# Patient Record
Sex: MALE | Race: WHITE | NOT HISPANIC OR LATINO | Employment: FULL TIME | URBAN - METROPOLITAN AREA
[De-identification: names, ages, dates, MRNs, and addresses within clinical notes are randomized per-mention and may not be internally consistent; named-entity substitution may affect disease eponyms.]

---

## 2017-01-05 ENCOUNTER — OFFICE VISIT (OUTPATIENT)
Dept: NEUROLOGY | Facility: CLINIC | Age: 62
End: 2017-01-05

## 2017-01-05 VITALS
DIASTOLIC BLOOD PRESSURE: 86 MMHG | HEIGHT: 66 IN | BODY MASS INDEX: 26.52 KG/M2 | OXYGEN SATURATION: 98 % | SYSTOLIC BLOOD PRESSURE: 140 MMHG | WEIGHT: 165 LBS | HEART RATE: 78 BPM

## 2017-01-05 DIAGNOSIS — I69.90 LATE EFFECTS OF CVA (CEREBROVASCULAR ACCIDENT): ICD-10-CM

## 2017-01-05 DIAGNOSIS — I63.81: Primary | ICD-10-CM

## 2017-01-05 DIAGNOSIS — G89.0 CENTRAL PAIN SYNDROME: ICD-10-CM

## 2017-01-05 PROCEDURE — 99213 OFFICE O/P EST LOW 20 MIN: CPT | Performed by: NURSE PRACTITIONER

## 2017-01-05 RX ORDER — GABAPENTIN 100 MG/1
100 CAPSULE ORAL 2 TIMES DAILY
Qty: 60 CAPSULE | Refills: 5 | Status: SHIPPED | OUTPATIENT
Start: 2017-01-05 | End: 2017-04-07

## 2017-01-05 NOTE — PROGRESS NOTES
Subjective:     Patient ID: Jose D Bailey is a 61 y.o. male.    Stroke   This is a new problem. Episode onset: 11/17/16-11/18/16 was at home and suddenly felt entire right side of body was shot with novacaine and thought it was his blood suagr and then called his wife and she came home and called 911. he was diagnosed with left thalamic infarct ischemic  The problem occurs constantly (numbness and tingling constant right face, eye, mouth, shoulder, abdomen, axilla, knee and flank). The problem has been gradually improving. Associated symptoms include numbness. Pertinent negatives include no abdominal pain, arthralgias, chest pain, chills, coughing, fatigue, fever, headaches, joint swelling, myalgias, nausea, neck pain, rash, sore throat, vomiting or weakness. Associated symptoms comments: Was off aspirin and Crestor prior to stroke for about 3 months and restarted this with admission. hgba1c 8.1%. Has had a stressful year with mother having a stroke and sister had breast cancer and it has been a tough year but denies anxiety or depression and the stress is improving. Has occasional cramping/squeezing in abdomen like a muscle spasms and right eye. Increased hot/cold sensation.. Nothing aggravates the symptoms. He has tried lying down and position changes for the symptoms. The treatment provided mild relief.      Admission to [facility] 11/17-11/18/2016 for left lacunar thalamic infarct with normal MRI brain. Workup as listed. CT of head w/o was normal, CT cerebral perfusion scan was negative, MRI was normal, CTA of neck only showed small calcification of posterior aspect of left ICA. ECHO showed normal LV fxn, EF 63%, with mild concentric hypertrophy of LV wall c/w LV diastolic dysfunction grade 1. Bilat Carotid duplex showed bilat 0-49% ICA stenosis. EKG showed NSR.     The following portions of the patient's history were reviewed and updated as appropriate: allergies, current medications, past family history, past  medical history, past social history, past surgical history and problem list.    Review of Systems   Constitutional: Negative for chills, fatigue, fever and unexpected weight change.   HENT: Negative for ear pain, hearing loss, nosebleeds, rhinorrhea and sore throat.    Eyes: Negative for photophobia, pain, discharge, itching and visual disturbance.   Respiratory: Negative for cough, chest tightness, shortness of breath and wheezing.    Cardiovascular: Negative for chest pain, palpitations and leg swelling.   Gastrointestinal: Negative for abdominal pain, blood in stool, constipation, diarrhea, nausea and vomiting.   Genitourinary: Negative for dysuria, frequency, hematuria and urgency.   Musculoskeletal: Negative for arthralgias, back pain, gait problem, joint swelling, myalgias, neck pain and neck stiffness.   Skin: Negative for rash and wound.   Allergic/Immunologic: Negative for environmental allergies and food allergies.   Neurological: Positive for numbness. Negative for dizziness, tremors, seizures, syncope, speech difficulty, weakness, light-headedness and headaches.        TINGLING   Hematological: Negative for adenopathy. Bruises/bleeds easily.   Psychiatric/Behavioral: Negative for agitation, confusion, decreased concentration, hallucinations, sleep disturbance and suicidal ideas. The patient is not nervous/anxious.         Objective:    Neurologic Exam     Mental Status   Oriented to person, place, and time.   Registration: recalls 3 of 3 objects. Recall at 5 minutes: recalls 3 of 3 objects. Follows 3 step commands.   Attention: normal. Concentration: normal.   Speech: speech is normal   Level of consciousness: alert  Knowledge: good and consistent with education. Able to perform simple calculations.   Able to name object. Able to read. Able to repeat. Able to write. Normal comprehension.     Cranial Nerves     CN II   Visual fields full to confrontation.     CN III, IV, VI   Pupils are equal, round, and  reactive to light.  Extraocular motions are normal.     CN V   Right facial sensation deficit: forehead, cheeks and mandible  Left facial sensation deficit: none  Right corneal reflex: normal  Left corneal reflex: normal  Jaw jerk: normal    CN VII   Right facial weakness: central (minimal but decreased with puffing cheeks)  Left facial weakness: none  Right taste: normal  Left taste: normal    CN VIII   CN VIII normal.     CN IX, X   CN IX normal.   CN X normal.     CN XI   CN XI normal.     CN XII   CN XII normal.     Motor Exam   Muscle bulk: normal  Overall muscle tone: normal    Strength   Strength 5/5 throughout.     Sensory Exam   Right arm light touch: decreased from elbow  Left arm light touch: normal  Right leg light touch: decreased from knee  Left leg light touch: normal  Vibration normal.   Proprioception normal.   Right arm pinprick: decreased from elbow  Left arm pinprick: normal  Right leg pinprick: decreased from knee  Left leg pinprick: normal       Decreased sensation to light tough right face, right ear, right shoulder, right wrist, right axilla, right abdomen.     Gait, Coordination, and Reflexes     Gait  Gait: normal    Coordination   Romberg: negative  Finger to nose coordination: normal  Heel to shin coordination: normal    Tremor   Resting tremor: absent  Intention tremor: absent  Action tremor: absent    Reflexes   Right brachioradialis: 2+  Left brachioradialis: 2+  Right biceps: 2+  Left biceps: 2+  Right triceps: 2+  Left triceps: 2+  Right patellar: 2+  Left patellar: 2+  Right achilles: 2+  Left achilles: 2+  Right : 2+  Left : 2+  Right plantar: normal  Left plantar: normal  Right Gauthier: absent  Left Gauthier: absent  Right ankle clonus: absent  Left ankle clonus: absent      Physical Exam   Constitutional: He is oriented to person, place, and time. He appears well-developed and well-nourished.   Eyes: EOM are normal. Pupils are equal, round, and reactive to light.    Cardiovascular: Normal rate, regular rhythm, S1 normal, S2 normal and normal heart sounds.    Pulmonary/Chest: Effort normal and breath sounds normal.   Neurological: He is oriented to person, place, and time. He has normal strength. He has a normal Finger-Nose-Finger Test, a normal Heel to Montejo Test and a normal Romberg Test. Gait normal.   Reflex Scores:       Tricep reflexes are 2+ on the right side and 2+ on the left side.       Bicep reflexes are 2+ on the right side and 2+ on the left side.       Brachioradialis reflexes are 2+ on the right side and 2+ on the left side.       Patellar reflexes are 2+ on the right side and 2+ on the left side.       Achilles reflexes are 2+ on the right side and 2+ on the left side.  Psychiatric: He has a normal mood and affect. His speech is normal and behavior is normal. Judgment and thought content normal. Cognition and memory are normal.       Assessment/Plan:     Jose D was seen today for weakness - generalized and stroke.    Diagnoses and all orders for this visit:    Left subthalamic lacunar stroke    Central pain syndrome  -     gabapentin (NEURONTIN) 100 MG capsule; Take 1 capsule by mouth 2 (Two) Times a Day.    Late effects of CVA (cerebrovascular accident)         Reviewed medications, potential side effects and signs and symptoms to report. Discussed risk versus benefits of treatment plan with patient and/or family-including medications, labs and radiology that may be ordered. Addressed questions and concerns during visit. Patient and/or family verbalized understanding and agree with plan. Discussed signs and symptoms of stroke and when to call 911. Instructed to follow a low fat diet including the Mediterranean diet. Instructed to take all medications daily as prescribed. Encouraged 30 minutes of exercise 3-4 times a week as tolerated. Stay well hydrated. Discussed potential side effects of new medications and signs and symptoms to report. If patient is currently  using tobacco products, smoking cessation was encouraged. Reviewed stroke risk factors and stroke prevention plan. Patient and/or family verbalizes understanding and agrees with plan. 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 He will let us know how he is doing this. These symptoms are a LE of CVA. Trying to limit medications as much as possible. Continue working with PCP for better diabetes management. F/U in 3 months for re-eval.     During this visit the following were done:  Labs Reviewed []    Labs Ordered []    Radiology Reports Reviewed []    Radiology Ordered []    PCP Records Reviewed []    Referring Provider Records Reviewed []    ER Records Reviewed []    Hospital Records Reviewed []    History Obtained From Family []    Radiology Images Reviewed []    Other Reviewed []    Records Requested []

## 2017-01-05 NOTE — LETTER
January 5, 2017     Clinton Day MD  [address]  [facility] [address]    Patient: Jose D Bailey   YOB: 1955   Date of Visit: 1/5/2017       Dear Dr. Shay MD:    Jose D Bailey was in my office today. Below is a copy of my note.    If you have questions, please do not hesitate to call me. I look forward to following Jose D along with you.         Sincerely,        BRENDON Abrams        CC: Daquan Melgar MD    Subjective:     Patient ID: Jose D Bailey is a 61 y.o. male.    Stroke   This is a new problem. Episode onset: 11/17/16-11/18/16 was at home and suddenly felt entire right side of body was shot with novacaine and thought it was his blood suagr and then called his wife and she came home and called 911. he was diagnosed with left thalamic infarct ischemic  The problem occurs constantly (numbness and tingling constant right face, eye, mouth, shoulder, abdomen, axilla, knee and flank). The problem has been gradually improving. Associated symptoms include numbness. Pertinent negatives include no abdominal pain, arthralgias, chest pain, chills, coughing, fatigue, fever, headaches, joint swelling, myalgias, nausea, neck pain, rash, sore throat, vomiting or weakness. Associated symptoms comments: Was off aspirin and Crestor prior to stroke for about 3 months and restarted this with admission. hgba1c 8.1%. Has had a stressful year with mother having a stroke and sister had breast cancer and it has been a tough year but denies anxiety or depression and the stress is improving. Has occasional cramping/squeezing in abdomen like a muscle spasms and right eye. Increased hot/cold sensation.. Nothing aggravates the symptoms. He has tried lying down and position changes for the symptoms. The treatment provided mild relief.      Admission to [facility] 11/17-11/18/2016 for left lacunar thalamic infarct with normal MRI brain. Workup as listed. CT of head w/o was normal, CT cerebral  perfusion scan was negative, MRI was normal, CTA of neck only showed small calcification of posterior aspect of left ICA. ECHO showed normal LV fxn, EF 63%, with mild concentric hypertrophy of LV wall c/w LV diastolic dysfunction grade 1. Bilat Carotid duplex showed bilat 0-49% ICA stenosis. EKG showed NSR.     The following portions of the patient's history were reviewed and updated as appropriate: allergies, current medications, past family history, past medical history, past social history, past surgical history and problem list.    Review of Systems   Constitutional: Negative for chills, fatigue, fever and unexpected weight change.   HENT: Negative for ear pain, hearing loss, nosebleeds, rhinorrhea and sore throat.    Eyes: Negative for photophobia, pain, discharge, itching and visual disturbance.   Respiratory: Negative for cough, chest tightness, shortness of breath and wheezing.    Cardiovascular: Negative for chest pain, palpitations and leg swelling.   Gastrointestinal: Negative for abdominal pain, blood in stool, constipation, diarrhea, nausea and vomiting.   Genitourinary: Negative for dysuria, frequency, hematuria and urgency.   Musculoskeletal: Negative for arthralgias, back pain, gait problem, joint swelling, myalgias, neck pain and neck stiffness.   Skin: Negative for rash and wound.   Allergic/Immunologic: Negative for environmental allergies and food allergies.   Neurological: Positive for numbness. Negative for dizziness, tremors, seizures, syncope, speech difficulty, weakness, light-headedness and headaches.        TINGLING   Hematological: Negative for adenopathy. Bruises/bleeds easily.   Psychiatric/Behavioral: Negative for agitation, confusion, decreased concentration, hallucinations, sleep disturbance and suicidal ideas. The patient is not nervous/anxious.         Objective:    Neurologic Exam     Mental Status   Oriented to person, place, and time.   Registration: recalls 3 of 3 objects.  Recall at 5 minutes: recalls 3 of 3 objects. Follows 3 step commands.   Attention: normal. Concentration: normal.   Speech: speech is normal   Level of consciousness: alert  Knowledge: good and consistent with education. Able to perform simple calculations.   Able to name object. Able to read. Able to repeat. Able to write. Normal comprehension.     Cranial Nerves     CN II   Visual fields full to confrontation.     CN III, IV, VI   Pupils are equal, round, and reactive to light.  Extraocular motions are normal.     CN V   Right facial sensation deficit: forehead, cheeks and mandible  Left facial sensation deficit: none  Right corneal reflex: normal  Left corneal reflex: normal  Jaw jerk: normal    CN VII   Right facial weakness: central (minimal but decreased with puffing cheeks)  Left facial weakness: none  Right taste: normal  Left taste: normal    CN VIII   CN VIII normal.     CN IX, X   CN IX normal.   CN X normal.     CN XI   CN XI normal.     CN XII   CN XII normal.     Motor Exam   Muscle bulk: normal  Overall muscle tone: normal    Strength   Strength 5/5 throughout.     Sensory Exam   Right arm light touch: decreased from elbow  Left arm light touch: normal  Right leg light touch: decreased from knee  Left leg light touch: normal  Vibration normal.   Proprioception normal.   Right arm pinprick: decreased from elbow  Left arm pinprick: normal  Right leg pinprick: decreased from knee  Left leg pinprick: normal       Decreased sensation to light tough right face, right ear, right shoulder, right wrist, right axilla, right abdomen.     Gait, Coordination, and Reflexes     Gait  Gait: normal    Coordination   Romberg: negative  Finger to nose coordination: normal  Heel to shin coordination: normal    Tremor   Resting tremor: absent  Intention tremor: absent  Action tremor: absent    Reflexes   Right brachioradialis: 2+  Left brachioradialis: 2+  Right biceps: 2+  Left biceps: 2+  Right triceps: 2+  Left  triceps: 2+  Right patellar: 2+  Left patellar: 2+  Right achilles: 2+  Left achilles: 2+  Right : 2+  Left : 2+  Right plantar: normal  Left plantar: normal  Right Gauthier: absent  Left Gauthier: absent  Right ankle clonus: absent  Left ankle clonus: absent      Physical Exam   Constitutional: He is oriented to person, place, and time. He appears well-developed and well-nourished.   Eyes: EOM are normal. Pupils are equal, round, and reactive to light.   Cardiovascular: Normal rate, regular rhythm, S1 normal, S2 normal and normal heart sounds.    Pulmonary/Chest: Effort normal and breath sounds normal.   Neurological: He is oriented to person, place, and time. He has normal strength. He has a normal Finger-Nose-Finger Test, a normal Heel to Montejo Test and a normal Romberg Test. Gait normal.   Reflex Scores:       Tricep reflexes are 2+ on the right side and 2+ on the left side.       Bicep reflexes are 2+ on the right side and 2+ on the left side.       Brachioradialis reflexes are 2+ on the right side and 2+ on the left side.       Patellar reflexes are 2+ on the right side and 2+ on the left side.       Achilles reflexes are 2+ on the right side and 2+ on the left side.  Psychiatric: He has a normal mood and affect. His speech is normal and behavior is normal. Judgment and thought content normal. Cognition and memory are normal.       Assessment/Plan:     Jose D was seen today for weakness - generalized and stroke.    Diagnoses and all orders for this visit:    Left subthalamic lacunar stroke    Central pain syndrome  -     gabapentin (NEURONTIN) 100 MG capsule; Take 1 capsule by mouth 2 (Two) Times a Day.    Late effects of CVA (cerebrovascular accident)         Reviewed medications, potential side effects and signs and symptoms to report. Discussed risk versus benefits of treatment plan with patient and/or family-including medications, labs and radiology that may be ordered. Addressed questions and concerns  during visit. Patient and/or family verbalized understanding and agree with plan. Discussed signs and symptoms of stroke and when to call 911. Instructed to follow a low fat diet including the Mediterranean diet. Instructed to take all medications daily as prescribed. Encouraged 30 minutes of exercise 3-4 times a week as tolerated. Stay well hydrated. Discussed potential side effects of new medications and signs and symptoms to report. If patient is currently using tobacco products, smoking cessation was encouraged. Reviewed stroke risk factors and stroke prevention plan. Patient and/or family verbalizes understanding and agrees with plan. 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 He will let us know how he is doing this. These symptoms are a LE of CVA. Trying to limit medications as much as possible. Continue working with PCP for better diabetes management. F/U in 3 months for re-eval.     During this visit the following were done:  Labs Reviewed []    Labs Ordered []    Radiology Reports Reviewed []    Radiology Ordered []    PCP Records Reviewed []    Referring Provider Records Reviewed []    ER Records Reviewed []    Hospital Records Reviewed []    History Obtained From Family []    Radiology Images Reviewed []    Other Reviewed []    Records Requested []

## 2017-01-05 NOTE — MR AVS SNAPSHOT
"                        Jose D Bailey   1/5/2017 2:30 PM   Office Visit    Dept Phone:  [phone]   Encounter #:  [id number]    Provider:  BRENDON Abrams   Department:  [facility] NEUROLOGY                Your Full Care Plan              Today's Medication Changes          These changes are accurate as of: 1/5/17  3:38 PM.  If you have any questions, ask your nurse or doctor.               New Medication(s)Ordered:     gabapentin 100 MG capsule   Commonly known as:  NEURONTIN   Take 1 capsule by mouth 2 (Two) Times a Day.   Started by:  BRENDON Abrams            Where to Get Your Medications      These medications were sent to [facility] [address] [place] [address] - [address] - [phone]  - [phone]   [address] [place] [address]     Phone:  [phone]     gabapentin 100 MG capsule                  Your Updated Medication List          This list is accurate as of: 1/5/17  3:38 PM.  Always use your most recent med list.                aspirin 81 MG tablet       COMFORT ASSIST INSULIN SYRINGE 31G X 5/16\" 0.5 ML misc   Generic drug:  Insulin Syringe-Needle U-100   USE AS DIRECTED ONCE DAILY       gabapentin 100 MG capsule   Commonly known as:  NEURONTIN   Take 1 capsule by mouth 2 (Two) Times a Day.       glucose blood test strip   Check glucose twice a day.       glucose monitor monitoring kit   Check glucose twice a day.       LANTUS 100 UNIT/ML injection   Generic drug:  insulin glargine       lisinopril 40 MG tablet   Commonly known as:  PRINIVIL,ZESTRIL   TAKE 1 TABLET BY MOUTH DAILY FOR BLOOD PRESSURE       metFORMIN 500 MG tablet   Commonly known as:  GLUCOPHAGE   Take 1 tablet by mouth 2 (Two) Times a Day With Meals.       nebivolol 5 MG tablet   Commonly known as:  BYSTOLIC   Take 1 tablet by mouth Daily.       pioglitazone 30 MG tablet   Commonly known as:  ACTOS   TAKE ONE TABLET BY MOUTH DAILY       rosuvastatin 40 MG " tablet   Commonly known as:  CRESTOR   Take 1 tablet by mouth Daily.               You Were Diagnosed With        Codes Comments    Left subthalamic lacunar stroke    -  Primary ICD-10-CM: I63.50  ICD-9-CM: 434.91     Central pain syndrome     ICD-10-CM: G89.0  ICD-9-CM: 338.0     Late effects of CVA (cerebrovascular accident)     ICD-10-CM: I69.90  ICD-9-CM: 438.9       Instructions     None    Patient Instructions History      Upcoming Appointments     Visit Type Date Time Department    HOSPITAL FOLLOW UP 1/5/2017  2:30 PM [facility]    FOLLOW UP 3/1/2017  8:00 AM [facility] [place]    FOLLOW UP 4/7/2017  1:00 PM [facility]    FOLLOW UP 4/21/2017  2:45 PM [facility]      [facility] Signup     Our records indicate that you have an active [employer] account.    You can view your After Visit Summary by going to [employer] and logging in with your [employer] username and password.  If you don't have a [employer] username and password but a parent or guardian has access to your record, the parent or guardian should login with their own [employer] username and password and access your record to view the After Visit Summary.    If you have questions, you can email [email or web address] or call [phone] to talk to our [employer] staff.  Remember, [employer] is NOT to be used for urgent needs.  For medical emergencies, dial 911.               Other Info from Your Visit           Your Appointments     Mar 01, 2017  8:00 AM EST   Follow Up with Clinton Day MD   [facility] FAMILY MEDICINE (--)    [address]  [facility] [id number] [phone]           Arrive 15 minutes prior to appointment.            Apr 07, 2017  1:00 PM EDT   Follow Up with BRENDON Abrams   [facility] NEUROLOGY (--)    [address]  [facility] [id number] [phone]           Arrive 15 minutes prior to  "appointment.            Apr 21, 2017  2:45 PM EDT   Follow Up with Daquan Melgar MD   [facility] CARDIOLOGY (--)    [address]  [facility] [address] [phone]           Arrive 15 minutes prior to appointment.              Allergies     Atorvastatin      Influenza Vaccines Unspecified     Unknown reaction    Pravachol [Pravastatin Sodium]      Pravastatin      Pseudoephedrine  Other (See Comments)    Simvastatin      Sulfa Antibiotics  Rash      Reason for Visit     Weakness - Generalized     Stroke           Vital Signs     Blood Pressure Pulse Height Weight Oxygen Saturation Body Mass Index    140/86 78 66\" (167.6 cm) 165 lb (74.8 kg) 98% 26.63 kg/m2    Smoking Status                   Never Smoker           Problems and Diagnoses Noted     Central pain syndrome    Late effects of CVA (cerebrovascular accident)    Left subthalamic lacunar stroke        "

## 2017-01-05 NOTE — Clinical Note
January 5, 2017     Clinton Day MD  [address]  [place] [address]    Patient: Jose D Bailey   YOB: 1955   Date of Visit: 1/5/2017       Dear Dr. Shay MD:    Thank you for referring Jose D Bailey to me for evaluation. Below are the relevant portions of my assessment and plan of care.       Jose D was seen today for weakness - generalized and stroke.    Diagnoses and all orders for this visit:    Left subthalamic lacunar stroke    Central pain syndrome  -     gabapentin (NEURONTIN) 100 MG capsule; Take 1 capsule by mouth 2 (Two) Times a Day.    Late effects of CVA (cerebrovascular accident)               If you have questions, please do not hesitate to call me. I look forward to following Jose D along with you.         Sincerely,        BRENDON Abrams        CC: Han Berry MD

## 2017-03-01 ENCOUNTER — OFFICE VISIT (OUTPATIENT)
Dept: FAMILY MEDICINE CLINIC | Facility: CLINIC | Age: 62
End: 2017-03-01

## 2017-03-01 VITALS
BODY MASS INDEX: 26.84 KG/M2 | DIASTOLIC BLOOD PRESSURE: 80 MMHG | OXYGEN SATURATION: 96 % | HEIGHT: 66 IN | WEIGHT: 167 LBS | HEART RATE: 68 BPM | TEMPERATURE: 98.2 F | SYSTOLIC BLOOD PRESSURE: 142 MMHG

## 2017-03-01 DIAGNOSIS — E11.9 TYPE 2 DIABETES MELLITUS WITHOUT COMPLICATION, WITHOUT LONG-TERM CURRENT USE OF INSULIN (HCC): Primary | ICD-10-CM

## 2017-03-01 DIAGNOSIS — I63.81: ICD-10-CM

## 2017-03-01 DIAGNOSIS — S62.307A CLOSED NONDISPLACED FRACTURE OF FIFTH METACARPAL BONE OF LEFT HAND, UNSPECIFIED PORTION OF METACARPAL, INITIAL ENCOUNTER: ICD-10-CM

## 2017-03-01 LAB — HBA1C MFR BLD: 7.8 %

## 2017-03-01 PROCEDURE — 83036 HEMOGLOBIN GLYCOSYLATED A1C: CPT | Performed by: FAMILY MEDICINE

## 2017-03-01 PROCEDURE — 99214 OFFICE O/P EST MOD 30 MIN: CPT | Performed by: FAMILY MEDICINE

## 2017-03-01 NOTE — PROGRESS NOTES
"Subjective   Jose D Bailey is a 61 y.o. male.     Diabetes   He presents for his follow-up diabetic visit. He has type 2 diabetes mellitus. No MedicAlert identification noted. There are no hypoglycemic associated symptoms. There are no diabetic associated symptoms. Pertinent negatives for diabetes include no chest pain. There are no hypoglycemic complications. There are no diabetic complications. Risk factors for coronary artery disease include diabetes mellitus and male sex. Current diabetic treatment includes oral agent (triple therapy) (Lantus,Metformin and Actos). He is compliant with treatment all of the time. His weight is stable. He is following a generally healthy diet. He has not had a previous visit with a dietitian. He participates in exercise intermittently. There is no change in his home blood glucose trend. An ACE inhibitor/angiotensin II receptor blocker is being taken. He does not see a podiatrist.Eye exam is current.      He states he is the same with stroke symptoms on his right side. Face arm and abdomen.  \"Tight feeling in the right side of his face and right side of his abdomen.\" that usually improves if he moves about. The patient does employ self massage of the right face and right arm. He does experience tightness in the right side of his abdomen and chest. It is fairly constant in nature. As a result of the stroke. He has no motor losses. He will be seeing neurology again in April. He does take gabapentin 100 mg at night, which gives some relief. His prescription is been written for twice a day and we instructed him to increase his gabapentin to 100 mg twice a day.    Also states he fell about 8 weeks ago and is still having pain in his left hand. This is in the area of the left fifth metacarpal distally. There is some mild tenderness there. Range of motion and no pain with resistance.    The following portions of the patient's history were reviewed and updated as appropriate: allergies, " "current medications, past social history and problem list.    Review of Systems   Respiratory: Negative for shortness of breath.    Cardiovascular: Negative for chest pain.   Musculoskeletal: Positive for arthralgias (left hand pain).   Neurological: Positive for numbness (and tightness of right face and abdomen).       Objective   Visit Vitals   • /80   • Pulse 68   • Temp 98.2 °F (36.8 °C)   • Ht 66\" (167.6 cm)   • Wt 167 lb (75.8 kg)   • SpO2 96%   • BMI 26.95 kg/m2     Physical Exam   Constitutional: He is oriented to person, place, and time. He appears well-developed and well-nourished.   Cardiovascular: Normal rate, regular rhythm and normal heart sounds.    No murmur heard.  Pulmonary/Chest: Effort normal and breath sounds normal. He has no wheezes. He has no rales.   Neurological: He is alert and oriented to person, place, and time. No cranial nerve deficit. He exhibits normal muscle tone.   Normal motor exam.   Nursing note and vitals reviewed.      Assessment/Plan   Problem List Items Addressed This Visit        Nervous and Auditory    Left subthalamic lacunar stroke      Other Visit Diagnoses     Type 2 diabetes mellitus without complication, without long-term current use of insulin    -  Primary    Relevant Orders    POC Glycosylated Hemoglobin (Hb A1C) (Completed)    Closed nondisplaced fracture of fifth metacarpal bone of left hand, unspecified portion of metacarpal, initial encounter                  Drink plenty fluids. Continue massage and stretching therapy of the right face, right chest, right abdomen, right upper extremity and right thigh. See neurology as well as cardiology in the month of April. He will need to recheck with his ophthalmologist in December 2017. His feet are doing well. Recommended to do regular exercise and to improve his diet. Regarding diabetes. Stay same regimen with Lantus and metformin. Continue to work on diet and weight loss. Increase the gabapentin that 100 mg " twice a day.    Continue medications as doing.    Follow up in 3 months fasting for general lab studies.      Scribed for Dr Clinton Day by Kirstin Mackenzie CMA.    I, Clinton Day MD, personally performed the services described in this documentation, as scribed by Kirstin Mackenzie in my presence, and is both accurate and complete.

## 2017-03-02 DIAGNOSIS — I10 ESSENTIAL HYPERTENSION: ICD-10-CM

## 2017-03-02 RX ORDER — LISINOPRIL 40 MG/1
TABLET ORAL
Qty: 30 TABLET | Refills: 1 | Status: SHIPPED | OUTPATIENT
Start: 2017-03-02 | End: 2017-05-08 | Stop reason: SDUPTHER

## 2017-04-07 ENCOUNTER — OFFICE VISIT (OUTPATIENT)
Dept: NEUROLOGY | Facility: CLINIC | Age: 62
End: 2017-04-07

## 2017-04-07 VITALS
HEIGHT: 66 IN | SYSTOLIC BLOOD PRESSURE: 134 MMHG | WEIGHT: 165 LBS | HEART RATE: 72 BPM | RESPIRATION RATE: 16 BRPM | DIASTOLIC BLOOD PRESSURE: 79 MMHG | BODY MASS INDEX: 26.52 KG/M2

## 2017-04-07 DIAGNOSIS — G89.0 CENTRAL PAIN SYNDROME: Primary | ICD-10-CM

## 2017-04-07 DIAGNOSIS — I69.90 LATE EFFECTS OF CVA (CEREBROVASCULAR ACCIDENT): ICD-10-CM

## 2017-04-07 DIAGNOSIS — M79.2 NEURALGIA OF FLANK, RIGHT: ICD-10-CM

## 2017-04-07 DIAGNOSIS — M79.2 NEURALGIA OF RIGHT UPPER EXTREMITY: ICD-10-CM

## 2017-04-07 PROCEDURE — 99213 OFFICE O/P EST LOW 20 MIN: CPT | Performed by: NURSE PRACTITIONER

## 2017-04-07 RX ORDER — GABAPENTIN 300 MG/1
TABLET, FILM COATED ORAL
Qty: 90 TABLET | Refills: 5 | Status: SHIPPED | OUTPATIENT
Start: 2017-04-07 | End: 2017-07-07

## 2017-04-07 NOTE — PROGRESS NOTES
Subjective:     Patient ID: Jose D Bailey is a 62 y.o. male.    History of Present Illness   This is a very pleasant 62-year-old male who presents for 3 month in neurological follow-up.  He suffered a left subthalamic lacunar stroke in November 2016.  He tells me he continues to experience numbness, tingling and tightening sensation in the right face, right upper arm and right abdomen/flank.  He tells me that these sensations go on throughout the day.  He has been taking the gabapentin 100 mg but only at nighttime.  He tells me he really cannot remember to take the morning dose.  He tells me he has noticed no difference in his symptoms overall.  We had started him on a very low-dose to prevent adverse effects, but he is willing to try higher dose of this medication.  He is still interested in once a day dosing.  He has had no recurrent stroke symptoms.  He is back to work and doing well overall.  He is followed closely by his primary care provider and also cardiology. He takes Aspirin and Crestor for 2nd stroke prevention.    The following portions of the patient's history were reviewed and updated as appropriate: allergies, current medications, past family history, past medical history, past social history, past surgical history and problem list.    Review of Systems   Constitutional: Negative for chills, fatigue, fever and unexpected weight change.   HENT: Negative for ear pain, hearing loss, nosebleeds, rhinorrhea and sore throat.    Eyes: Negative for photophobia, pain, discharge, itching and visual disturbance.   Respiratory: Negative for cough, chest tightness, shortness of breath and wheezing.    Cardiovascular: Negative for chest pain, palpitations and leg swelling.   Gastrointestinal: Negative for abdominal pain, blood in stool, constipation, diarrhea, nausea and vomiting.   Genitourinary: Negative for dysuria, frequency, hematuria and urgency.   Musculoskeletal: Negative for arthralgias, back pain, gait  problem, joint swelling, myalgias, neck pain and neck stiffness.   Skin: Negative for rash and wound.   Allergic/Immunologic: Negative for environmental allergies and food allergies.   Neurological: Positive for numbness. Negative for dizziness, tremors, seizures, syncope, speech difficulty, weakness, light-headedness and headaches.        Tingling   Hematological: Negative for adenopathy. Does not bruise/bleed easily.   Psychiatric/Behavioral: Negative for agitation, confusion, decreased concentration, hallucinations, sleep disturbance and suicidal ideas. The patient is not nervous/anxious.         Objective:    Neurologic Exam     Mental Status   Oriented to person, place, and time.   Registration: recalls 3 of 3 objects. Recall at 5 minutes: recalls 3 of 3 objects. Follows 3 step commands.   Attention: normal. Concentration: normal.   Speech: speech is normal   Level of consciousness: alert  Knowledge: good and consistent with education. Able to perform simple calculations.   Able to name object. Able to read. Able to repeat. Able to write. Normal comprehension.     Cranial Nerves     CN II   Visual fields full to confrontation.     CN III, IV, VI   Pupils are equal, round, and reactive to light.  Extraocular motions are normal.     CN V   Right facial sensation deficit: cheeks, forehead and mandible  Left facial sensation deficit: none  Right corneal reflex: normal  Left corneal reflex: normal  Jaw jerk: normal    CN VII   Facial expression full, symmetric.     CN VIII   CN VIII normal.     CN IX, X   CN IX normal.   CN X normal.     CN XI   CN XI normal.     CN XII   CN XII normal.     Motor Exam   Muscle bulk: normal  Overall muscle tone: normal    Strength   Strength 5/5 throughout.     Sensory Exam   Right arm light touch: right upper arm, right flank.  Left arm light touch: normal  Right leg light touch: normal  Left leg light touch: normal  Vibration normal.   Proprioception normal.   Right arm pinprick:  right upper arm, right flank.  Left arm pinprick: normal  Right leg pinprick: normal  Left leg pinprick: normal    Gait, Coordination, and Reflexes     Gait  Gait: normal    Coordination   Romberg: negative  Finger to nose coordination: normal  Heel to shin coordination: normal    Tremor   Resting tremor: absent  Intention tremor: absent  Action tremor: absent    Reflexes   Right brachioradialis: 2+  Left brachioradialis: 2+  Right biceps: 2+  Left biceps: 2+  Right triceps: 2+  Left triceps: 2+  Right patellar: 2+  Left patellar: 2+  Right achilles: 2+  Left achilles: 2+  Right : 2+  Left : 2+  Right plantar: normal  Left plantar: normal  Right Gauthier: absent  Left Gauthier: absent  Right ankle clonus: absent  Left ankle clonus: absent      Physical Exam   Constitutional: He is oriented to person, place, and time.   Eyes: EOM are normal. Pupils are equal, round, and reactive to light.   Neurological: He is oriented to person, place, and time. He has normal strength. He has a normal Finger-Nose-Finger Test, a normal Heel to Montejo Test and a normal Romberg Test. Gait normal.   Reflex Scores:       Tricep reflexes are 2+ on the right side and 2+ on the left side.       Bicep reflexes are 2+ on the right side and 2+ on the left side.       Brachioradialis reflexes are 2+ on the right side and 2+ on the left side.       Patellar reflexes are 2+ on the right side and 2+ on the left side.       Achilles reflexes are 2+ on the right side and 2+ on the left side.  Psychiatric: His speech is normal.       Assessment/Plan:     Jose D was seen today for stroke.    Diagnoses and all orders for this visit:    Central pain syndrome  -     GRALISE 300 MG tablet; 1-3 tablets each day with evening meal    Neuralgia of flank, right  -     GRALISE 300 MG tablet; 1-3 tablets each day with evening meal    Neuralgia of right upper extremity  -     GRALISE 300 MG tablet; 1-3 tablets each day with evening meal    Late effects of CVA  (cerebrovascular accident)       We will discontinue the gabapentin IR.  We will start him on release 300 mg 1 tablet with evening meal for the next several days.  He may increase to 2 tablets with evening meal after 4-5 days and may go up to a maximum of 3 tablets with evening meal.  This should give him significant relief of his symptoms with the once a day dosing.  I've explained that this is still a lower dose and that we could always increase the medication if needed.  He will call us with any issues or if he continues to have symptoms and is not getting adequate relief with this dosage.  He will follow-up in 3 months or sooner if needed.  He will follow closely with PCP and cardiology as scheduled. Reviewed medications, potential side effects and signs and symptoms to report. Discussed risk versus benefits of treatment plan with patient and/or family-including medications, labs and radiology that may be ordered. Addressed questions and concerns during visit. Patient and/or family verbalized understanding and agree with plan.

## 2017-04-17 RX ORDER — INSULIN GLARGINE 100 [IU]/ML
INJECTION, SOLUTION SUBCUTANEOUS
Qty: 2 EACH | Refills: 2 | Status: SHIPPED | OUTPATIENT
Start: 2017-04-17 | End: 2017-09-08 | Stop reason: SDUPTHER

## 2017-04-21 ENCOUNTER — OFFICE VISIT (OUTPATIENT)
Dept: CARDIOLOGY | Facility: CLINIC | Age: 62
End: 2017-04-21

## 2017-04-21 VITALS
HEIGHT: 66 IN | DIASTOLIC BLOOD PRESSURE: 74 MMHG | BODY MASS INDEX: 27.29 KG/M2 | HEART RATE: 76 BPM | SYSTOLIC BLOOD PRESSURE: 136 MMHG | WEIGHT: 169.8 LBS

## 2017-04-21 DIAGNOSIS — M79.2 NEURALGIA OF RIGHT UPPER EXTREMITY: ICD-10-CM

## 2017-04-21 DIAGNOSIS — IMO0001 UNCONTROLLED TYPE 2 DIABETES MELLITUS WITHOUT COMPLICATION, WITH LONG-TERM CURRENT USE OF INSULIN: ICD-10-CM

## 2017-04-21 DIAGNOSIS — E66.3 OVERWEIGHT (BMI 25.0-29.9): ICD-10-CM

## 2017-04-21 DIAGNOSIS — I11.9 HYPERTENSIVE HEART DISEASE WITHOUT HEART FAILURE: Primary | ICD-10-CM

## 2017-04-21 DIAGNOSIS — E78.5 HYPERLIPIDEMIA, UNSPECIFIED HYPERLIPIDEMIA TYPE: ICD-10-CM

## 2017-04-21 DIAGNOSIS — I10 ESSENTIAL HYPERTENSION: ICD-10-CM

## 2017-04-21 PROCEDURE — 99213 OFFICE O/P EST LOW 20 MIN: CPT | Performed by: INTERNAL MEDICINE

## 2017-04-21 NOTE — PROGRESS NOTES
Subjective:     Encounter Date:04/21/2017      Patient ID: Jose D Bailey is a 62 y.o.  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      REFERRING PHYSICIAN: Clinton Day MD/BRENDON Serrano    Chief Complaint:   Chief Complaint   Patient presents with   • Follow-up     HTN     Problem List:  1. Probable hypertensive cardiovascular disease:  a. Apparent remote screening 2D echocardiogram, data deficit ([facility]), approximately 2005.   b. Apparent unremarkable serial treadmill GXT/Cardiolite GXT, data deficit ([facility], serially every 3-4 years x14 years).   c. Abnormal screening treadmill GXT showing physical deconditioning (accelerated HR with 107% age-predicted MHR with 50% PEC) with post-exercise EKG changes in the absence of anginal type chest pain, as well as frequent PVCs/PACs during exercise without complex forms; without accelerated blood pressure ([facility] [place]), 04/04/12.   d. Acceptable IV SANDI scan Cardiolite study, LVEF (0.68) with residual class I symptoms, June 2012.  e. Residual class I symptoms.  2. Hypertension with remote suboptimal control, improved.  3. Dyslipidemia, maintained on statin.   4. Uncontrolled type 2 insulin dependent diabetes (insulin dependent since 2009); diagnosed approximately 1998, hemoglobin A1c 8.5% (November 2016).  5. Mild overweight status, BMI 27.4.   6. Bilateral plantar fasciitis; utilizing orthotics.   7. Cataract extraction (OD, 2009; OS, 2011).   8. Recent TIA versus stroke with nonobstructive disease on carotid duplex study, acceptable chest x-ray and normal MRI with and without contrast, negative CT cerebral perfusion study with and without contrast, negative neck CTA, normal intracranial CTA, and normal unenhanced CT scan (November 2016).     Allergies   Allergen Reactions   • Atorvastatin    • Influenza Vaccines       "Unknown reaction   • Pravachol [Pravastatin Sodium]    • Pravastatin    • Pseudoephedrine Other (See Comments)   • Simvastatin    • Sulfa Antibiotics Rash         Current Outpatient Prescriptions:   •  aspirin 81 MG tablet, Take 81 mg by mouth Daily., Disp: , Rfl:   •  COMFORT ASSIST INSULIN SYRINGE 31G X 5/16\" 0.5 ML misc, USE AS DIRECTED ONCE DAILY, Disp: 100 each, Rfl: 2  •  glucose blood test strip, Check glucose twice a day., Disp: 50 each, Rfl: 7  •  glucose monitor monitoring kit, Check glucose twice a day., Disp: 1 each, Rfl: 0  •  GRALISE 300 MG tablet, 1-3 tablets each day with evening meal, Disp: 90 tablet, Rfl: 5  •  LANTUS 100 UNIT/ML injection, USE 35 UNITS DAILY, Disp: 2 each, Rfl: 2  •  lisinopril (PRINIVIL,ZESTRIL) 40 MG tablet, TAKE 1 TABLET BY MOUTH DAILY FOR BLOOD PRESSURE, Disp: 30 tablet, Rfl: 1  •  metFORMIN (GLUCOPHAGE) 500 MG tablet, TAKE ONE TABLET BY MOUTH TWICE A DAY WITH MEALS, Disp: 60 tablet, Rfl: 0  •  nebivolol (BYSTOLIC) 5 MG tablet, Take 1 tablet by mouth Daily., Disp: 30 tablet, Rfl: 6  •  pioglitazone (ACTOS) 30 MG tablet, TAKE ONE TABLET BY MOUTH DAILY (Patient taking differently: TAKES 1/2 TABLET BY MOUTH DAILY), Disp: 30 tablet, Rfl: 2  •  rosuvastatin (CRESTOR) 40 MG tablet, Take 1 tablet by mouth Daily., Disp: 30 tablet, Rfl: 5    History of Present Illness Patient returns for scheduled 4-month followup. He says that he is \"about the same\" as he was at his last visit.  He checks his blood pressure at home and has been tolerating the Bystolic \"just fine.\"  He does not do any regular exercise/activity, but he stays active; he notes that he has 5 grandsons, ranging in age from 23 months to 8 years, and they keep him busy.  Two of his grandsons are living with them now while their house is being built.  He is asymptomatic from a cardiopulmonary perspective with his activities.  He says that he has a blood pressure machine at home and \"tries to check it\" occasionally, with it " "usually running 140s/80s.  He notes that he sees Dr. Day about every 3 months and has seen BRENDON Serrano, a few times. Patient otherwise denies chest pain, shortness of breath, PND, edema, palpitations, syncope or presyncope at this time.        Review of Systems   Hematologic/Lymphatic: Bruises/bleeds easily.   Neurological: Positive for numbness (right side).      Obtained and otherwise negative except as outlined in problem list and HPI.    Procedures       Objective:       Vitals:    04/21/17 1438 04/21/17 1440 04/21/17 1451   BP: 146/87 146/87 136/74   BP Location: Left arm Left arm Left arm   Patient Position: Sitting Standing Sitting   Pulse: 75 76    Weight: 169 lb 12.8 oz (77 kg)     Height: 66\" (167.6 cm)       Body mass index is 27.41 kg/(m^2).   Last weight:  165 lbs.    Physical Exam   Constitutional: He is oriented to person, place, and time. He appears well-developed and well-nourished.   Neck: No JVD present. Carotid bruit is not present. No thyromegaly present.   Cardiovascular: Regular rhythm, S1 normal, S2 normal and normal heart sounds.  Exam reveals no gallop, no S3 and no friction rub.    No murmur heard.  Pulses:       Dorsalis pedis pulses are 2+ on the right side, and 2+ on the left side.        Posterior tibial pulses are 2+ on the right side, and 2+ on the left side.   Pulmonary/Chest: Effort normal and breath sounds normal. He has no wheezes. He has no rhonchi. He has no rales.   Abdominal: Soft. He exhibits no mass. There is no hepatosplenomegaly. There is no tenderness. There is no guarding.   Lymphadenopathy:     He has no cervical adenopathy.   Neurological: He is alert and oriented to person, place, and time.   Skin: Skin is warm, dry and intact. No rash noted.   Vitals reviewed.      Lab Review:   Lab Results   Component Value Date    GLUCOSE 60 (L) 12/30/2016    BUN 11 12/30/2016    CREATININE 0.90 12/30/2016    EGFRIFNONA 86 12/30/2016    BCR 12.2 12/30/2016    CO2 31.0 " 12/30/2016    CALCIUM 10.0 12/30/2016    ALBUMIN 4.50 12/30/2016    LABIL2 1.9 12/30/2016    AST 25 12/30/2016    ALT 25 12/30/2016       Lab Results   Component Value Date    WBC 7.73 11/17/2016    HGB 16.3 11/17/2016    HCT 47.8 11/17/2016    MCV 88.0 11/17/2016     11/17/2016       Lab Results   Component Value Date    HGBA1C 7.8 03/01/2017       Lab Results   Component Value Date    TSH 1.010 11/18/2016       Lab Results   Component Value Date    CHOL 132 12/30/2016    CHOL 228 (H) 11/18/2016     Lab Results   Component Value Date    TRIG 79 12/30/2016    TRIG 94 11/18/2016    TRIG 72 08/12/2015     Lab Results   Component Value Date    HDL 53 12/30/2016    HDL 42 11/18/2016    HDL 50 08/12/2015   LDL - 59 (12/30/2016)      Assessment:   Overall continued acceptable course with no interim cardiopulmonary complaints with good functional status. We will defer additional diagnostic or therapeutic intervention from a cardiac perspective at this time.  Hopefully, we will be allowed to review any upcoming laboratory studies he has drawn in Dr. Day's office.        Diagnosis Plan   1. Hypertensive heart disease without heart failure     2. Hyperlipidemia, unspecified hyperlipidemia type     3. Uncontrolled type 2 diabetes mellitus without complication, with long-term current use of insulin     4. Overweight (BMI 25.0-29.9)     5. Neuralgia of right upper extremity     6. Essential hypertension            Plan:         1. Patient to continue current medications and close follow up with the above providers.  2. Tentative cardiology follow up in November or December 2017, or patient may return sooner PRN.       Transcribed by Xiomara Jacobs for Dr. Daquan Melgar at 2:50 PM on 04/21/2017      IDaquan MD, [facility], personally performed the services described in this documentation as scribed by the above named individual in my presence, and it is both accurate and complete. At 3:31 PM on  04/21/2017

## 2017-04-25 ENCOUNTER — TELEPHONE (OUTPATIENT)
Dept: NEUROLOGY | Facility: CLINIC | Age: 62
End: 2017-04-25

## 2017-04-25 NOTE — TELEPHONE ENCOUNTER
PT CALLED OFFICE TODAY ASKING ABOUT A PA ON HIS MEDICATION. HE STATES THAT HE IS OUT OF GRALISE AND THE NUMBNESS AND THINGS ARE GETTING TO HIM.

## 2017-04-27 NOTE — TELEPHONE ENCOUNTER
Received a fax from [employer] stating that the medication was approved.     Called the patient, no answer, left message with details of the outcomes.     Called the patient pharmacy, spoke with tech, advised her of the outcomes.

## 2017-05-08 DIAGNOSIS — I10 ESSENTIAL HYPERTENSION: ICD-10-CM

## 2017-05-08 RX ORDER — LISINOPRIL 40 MG/1
TABLET ORAL
Qty: 30 TABLET | Refills: 0 | Status: SHIPPED | OUTPATIENT
Start: 2017-05-08 | End: 2017-06-10 | Stop reason: SDUPTHER

## 2017-05-11 ENCOUNTER — TELEPHONE (OUTPATIENT)
Dept: NEUROLOGY | Facility: CLINIC | Age: 62
End: 2017-05-11

## 2017-05-22 ENCOUNTER — TELEPHONE (OUTPATIENT)
Dept: NEUROLOGY | Facility: CLINIC | Age: 62
End: 2017-05-22

## 2017-06-02 ENCOUNTER — OFFICE VISIT (OUTPATIENT)
Dept: FAMILY MEDICINE CLINIC | Facility: CLINIC | Age: 62
End: 2017-06-02

## 2017-06-02 VITALS
TEMPERATURE: 98.2 F | SYSTOLIC BLOOD PRESSURE: 122 MMHG | HEIGHT: 66 IN | RESPIRATION RATE: 18 BRPM | DIASTOLIC BLOOD PRESSURE: 64 MMHG | WEIGHT: 166 LBS | BODY MASS INDEX: 26.68 KG/M2

## 2017-06-02 DIAGNOSIS — IMO0001 UNCONTROLLED TYPE 2 DIABETES MELLITUS WITHOUT COMPLICATION, WITH LONG-TERM CURRENT USE OF INSULIN: Primary | ICD-10-CM

## 2017-06-02 DIAGNOSIS — E78.00 PURE HYPERCHOLESTEROLEMIA: ICD-10-CM

## 2017-06-02 DIAGNOSIS — N40.1 BENIGN NON-NODULAR PROSTATIC HYPERPLASIA WITH LOWER URINARY TRACT SYMPTOMS: ICD-10-CM

## 2017-06-02 LAB
ALBUMIN SERPL-MCNC: 4.6 G/DL (ref 3.2–4.8)
ALBUMIN/GLOB SERPL: 1.8 G/DL (ref 1.5–2.5)
ALP SERPL-CCNC: 95 U/L (ref 25–100)
ALT SERPL W P-5'-P-CCNC: 26 U/L (ref 7–40)
ANION GAP SERPL CALCULATED.3IONS-SCNC: 7 MMOL/L (ref 3–11)
ARTICHOKE IGE QN: 55 MG/DL (ref 0–130)
AST SERPL-CCNC: 23 U/L (ref 0–33)
BILIRUB SERPL-MCNC: 0.6 MG/DL (ref 0.3–1.2)
BUN BLD-MCNC: 11 MG/DL (ref 9–23)
BUN/CREAT SERPL: 12.2 (ref 7–25)
CALCIUM SPEC-SCNC: 9.9 MG/DL (ref 8.7–10.4)
CHLORIDE SERPL-SCNC: 103 MMOL/L (ref 99–109)
CHOLEST SERPL-MCNC: 121 MG/DL (ref 0–200)
CO2 SERPL-SCNC: 30 MMOL/L (ref 20–31)
CREAT BLD-MCNC: 0.9 MG/DL (ref 0.6–1.3)
GFR SERPL CREATININE-BSD FRML MDRD: 85 ML/MIN/1.73
GLOBULIN UR ELPH-MCNC: 2.6 GM/DL
GLUCOSE BLD-MCNC: 73 MG/DL (ref 70–100)
HBA1C MFR BLD: 8.9 %
HDLC SERPL-MCNC: 41 MG/DL (ref 40–60)
POC CREATININE URINE: ABNORMAL
POC MICROALBUMIN URINE: ABNORMAL
POTASSIUM BLD-SCNC: 4.1 MMOL/L (ref 3.5–5.5)
PROT SERPL-MCNC: 7.2 G/DL (ref 5.7–8.2)
PSA SERPL-MCNC: 2.09 NG/ML (ref 0–4)
SODIUM BLD-SCNC: 140 MMOL/L (ref 132–146)
TRIGL SERPL-MCNC: 83 MG/DL (ref 0–150)

## 2017-06-02 PROCEDURE — 80061 LIPID PANEL: CPT | Performed by: FAMILY MEDICINE

## 2017-06-02 PROCEDURE — 82044 UR ALBUMIN SEMIQUANTITATIVE: CPT | Performed by: FAMILY MEDICINE

## 2017-06-02 PROCEDURE — 36415 COLL VENOUS BLD VENIPUNCTURE: CPT | Performed by: FAMILY MEDICINE

## 2017-06-02 PROCEDURE — 99213 OFFICE O/P EST LOW 20 MIN: CPT | Performed by: FAMILY MEDICINE

## 2017-06-02 PROCEDURE — 83036 HEMOGLOBIN GLYCOSYLATED A1C: CPT | Performed by: FAMILY MEDICINE

## 2017-06-02 PROCEDURE — 80053 COMPREHEN METABOLIC PANEL: CPT | Performed by: FAMILY MEDICINE

## 2017-06-02 PROCEDURE — 84153 ASSAY OF PSA TOTAL: CPT | Performed by: FAMILY MEDICINE

## 2017-06-02 RX ORDER — GABAPENTIN 100 MG/1
100 CAPSULE ORAL 2 TIMES DAILY
COMMUNITY
End: 2017-07-07

## 2017-06-02 NOTE — PROGRESS NOTES
"Subjective   Jose D Bailey is a 62 y.o. male.     Diabetes   He presents for his follow-up diabetic visit. He has type 2 diabetes mellitus. His disease course has been stable. There are no hypoglycemic associated symptoms. Pertinent negatives for hypoglycemia include no headaches or speech difficulty. There are no diabetic associated symptoms. Pertinent negatives for diabetes include no chest pain. There are no hypoglycemic complications. Diabetic complications include a CVA. Risk factors for coronary artery disease include diabetes mellitus and male sex. Current diabetic treatment includes insulin injections and oral agent (dual therapy) (Lantus,Actos, and Metformin). He is compliant with treatment all of the time. His weight is stable. He is following a generally healthy diet. Meal planning includes avoidance of concentrated sweets. He participates in exercise intermittently. His home blood glucose trend is increasing steadily. An ACE inhibitor/angiotensin II receptor blocker is being taken. He does not see a podiatrist.Eye exam is current (in December 2016.).        The following portions of the patient's history were reviewed and updated as appropriate: allergies, current medications, past social history and problem list.    Review of Systems   Constitutional: Negative for chills and fever.   Respiratory: Negative for shortness of breath.    Cardiovascular: Negative for chest pain.   Genitourinary: Positive for frequency.   Neurological: Positive for numbness (and tingling of right side of face ,right arm/hand, and right leg and foot). Negative for syncope, facial asymmetry, speech difficulty and headaches.       Objective   /64  Temp 98.2 °F (36.8 °C)  Resp 18  Ht 66\" (167.6 cm)  Wt 166 lb (75.3 kg)  BMI 26.79 kg/m2  Physical Exam   Constitutional: He appears well-developed and well-nourished.   Cardiovascular: Normal rate, regular rhythm and normal heart sounds.    Pulmonary/Chest: Effort normal and " breath sounds normal.   Nursing note and vitals reviewed.      Assessment/Plan   Problem List Items Addressed This Visit        Cardiovascular and Mediastinum    Hyperlipidemia       Endocrine    Uncontrolled type 2 diabetes mellitus - Primary    Relevant Orders    POC Glycosylated Hemoglobin (Hb A1C) (Completed)      Other Visit Diagnoses     Benign non-nodular prostatic hyperplasia with lower urinary tract symptoms                  Drink plenty fluids.    Increase the Metformin 500 mg to two in the am and 1 in the pm.    Continue other medications as doing.    Check a CMP,Lipids,PSA, and Micro albumin. Report results by letter.      Follow up in 3 months.            I, Clinton Day MD, personally performed the services described in this documentation, as scribed by Kirstin Mackenzie in my presence, and is both accurate and complete.

## 2017-06-10 DIAGNOSIS — I10 ESSENTIAL HYPERTENSION: ICD-10-CM

## 2017-06-12 RX ORDER — LISINOPRIL 40 MG/1
TABLET ORAL
Qty: 30 TABLET | Refills: 0 | Status: SHIPPED | OUTPATIENT
Start: 2017-06-12 | End: 2017-07-13 | Stop reason: SDUPTHER

## 2017-07-07 ENCOUNTER — OFFICE VISIT (OUTPATIENT)
Dept: NEUROLOGY | Facility: CLINIC | Age: 62
End: 2017-07-07

## 2017-07-07 VITALS
WEIGHT: 165 LBS | HEART RATE: 67 BPM | SYSTOLIC BLOOD PRESSURE: 135 MMHG | BODY MASS INDEX: 26.52 KG/M2 | DIASTOLIC BLOOD PRESSURE: 77 MMHG | HEIGHT: 66 IN

## 2017-07-07 DIAGNOSIS — M79.2 NEURALGIA OF FLANK, RIGHT: ICD-10-CM

## 2017-07-07 DIAGNOSIS — G89.0 CENTRAL PAIN SYNDROME: Primary | ICD-10-CM

## 2017-07-07 DIAGNOSIS — I69.90 LATE EFFECTS OF CVA (CEREBROVASCULAR ACCIDENT): ICD-10-CM

## 2017-07-07 DIAGNOSIS — M79.2 NEURALGIA OF RIGHT UPPER EXTREMITY: ICD-10-CM

## 2017-07-07 PROCEDURE — 99214 OFFICE O/P EST MOD 30 MIN: CPT | Performed by: NURSE PRACTITIONER

## 2017-07-07 RX ORDER — GABAPENTIN 300 MG/1
300 CAPSULE ORAL 3 TIMES DAILY
Qty: 90 CAPSULE | Refills: 5 | Status: SHIPPED | OUTPATIENT
Start: 2017-07-07 | End: 2017-10-19 | Stop reason: SDUPTHER

## 2017-07-07 NOTE — PROGRESS NOTES
Subjective:     Patient ID: Jose D Bailey is a 62 y.o. male.    History of Present Illness   Here for 3 month follow up on right sided paresthesias with central pain syndrome following suspected left subthalamic lacunar stroke from November 2016. He was not approved for [facility] after writing 2 letters and completing on phone review. He has been taking Gabapentin 200mg QHS and he is sleeping well, but he tells me he continues to experience numbness, tingling and tightening sensation in the right face, right upper arm and right abdomen/flank and these sensations go on throughout the day and are bothersome and are moderate in severity.  He has wanted to be very conservative in his management of symptoms, but he tells me he would like to try to increase the gabapentin to see if he can tolerate it and have more relief of the symptoms.  He does work and [employer] in [place], [place].  He has had no recurrent stroke symptoms.  His most recent hemoglobin A1c was 8.9% he is followed closely by his primary care provider as well as his cardiologist.  History of left subthalamic lacunar stroke in November 2016 with normal MRI Brain.    The following portions of the patient's history were reviewed and updated as appropriate: allergies, current medications, past family history, past medical history, past social history, past surgical history and problem list.    Review of Systems   Constitutional: Negative for chills, fatigue, fever and unexpected weight change.   HENT: Negative for ear pain, hearing loss, nosebleeds, rhinorrhea and sore throat.    Eyes: Negative for photophobia, pain, discharge, itching and visual disturbance.   Respiratory: Negative for cough, chest tightness, shortness of breath and wheezing.    Cardiovascular: Negative for chest pain, palpitations and leg swelling.   Gastrointestinal: Negative for abdominal pain, blood in stool, constipation, diarrhea, nausea and vomiting.   Genitourinary:  Negative for dysuria, frequency, hematuria and urgency.   Musculoskeletal: Negative for arthralgias, back pain, gait problem, joint swelling, myalgias, neck pain and neck stiffness.   Skin: Negative for rash and wound.   Allergic/Immunologic: Negative for environmental allergies and food allergies.   Neurological: Positive for numbness. Negative for dizziness, tremors, seizures, syncope, speech difficulty, weakness, light-headedness and headaches.   Hematological: Negative for adenopathy. Does not bruise/bleed easily.   Psychiatric/Behavioral: Negative for agitation, confusion, decreased concentration, hallucinations, sleep disturbance and suicidal ideas. The patient is not nervous/anxious.         Objective:    Neurologic Exam     Mental Status   Oriented to person, place, and time.   Level of consciousness: alert    Cranial Nerves     CN II   Visual fields full to confrontation.     CN III, IV, VI   Pupils are equal, round, and reactive to light.  Extraocular motions are normal.     CN V   Right facial sensation deficit: forehead, cheeks and mandible  Left facial sensation deficit: none    CN VII   Facial expression full, symmetric.     CN VIII   CN VIII normal.     CN IX, X   CN IX normal.   CN X normal.     CN XI   CN XI normal.     CN XII   CN XII normal.     Motor Exam   Muscle bulk: normal  Overall muscle tone: normal    Strength   Strength 5/5 throughout.     Sensory Exam   Right arm light touch: decreased RUE.  Left arm light touch: normal  Right leg light touch: normal  Left leg light touch: normal  Vibration normal.   Right arm pinprick: decreased RUE.  Left arm pinprick: normal  Right leg pinprick: normal  Left leg pinprick: normal       Decreased sensation Right facial, Right Arm, Right Flank.     Gait, Coordination, and Reflexes     Gait  Gait: normal    Coordination   Romberg: negative  Finger to nose coordination: normal  Heel to shin coordination: normal    Tremor   Resting tremor: absent  Intention  tremor: absent  Action tremor: absent    Reflexes   Right brachioradialis: 2+  Left brachioradialis: 2+  Right biceps: 2+  Left biceps: 2+  Right triceps: 2+  Left triceps: 2+  Right patellar: 2+  Left patellar: 2+  Right achilles: 2+  Left achilles: 2+  Right : 2+  Left : 2+      Physical Exam   Constitutional: He is oriented to person, place, and time.   Eyes: EOM are normal. Pupils are equal, round, and reactive to light.   Neurological: He is oriented to person, place, and time. He has normal strength. He has a normal Finger-Nose-Finger Test, a normal Heel to Montejo Test and a normal Romberg Test. Gait normal.   Reflex Scores:       Tricep reflexes are 2+ on the right side and 2+ on the left side.       Bicep reflexes are 2+ on the right side and 2+ on the left side.       Brachioradialis reflexes are 2+ on the right side and 2+ on the left side.       Patellar reflexes are 2+ on the right side and 2+ on the left side.       Achilles reflexes are 2+ on the right side and 2+ on the left side.      Assessment/Plan:     Jose D was seen today for stroke and numbness.    Diagnoses and all orders for this visit:    Central pain syndrome  -     gabapentin (NEURONTIN) 300 MG capsule; Take 1 capsule by mouth 3 (Three) Times a Day.    Neuralgia of flank, right  -     gabapentin (NEURONTIN) 300 MG capsule; Take 1 capsule by mouth 3 (Three) Times a Day.    Neuralgia of right upper extremity  -     gabapentin (NEURONTIN) 300 MG capsule; Take 1 capsule by mouth 3 (Three) Times a Day.    Late effects of CVA (cerebrovascular accident)          We will increase his Gabapentin for better pain control. Gabapentin take 300mg in morning and 300mg in evening for 1 week and then may add middle of the day 300mg dosage as tolerated. If Gabapentin middle of day dose causes too much dizziness or drowsiness may take 300mg in morning and 600mg (2 capsules) at night. F/U in 3 months for re-eval of symptoms and if needed increase or  adjustments in medications. He will call with any issues tolerating medication prior to follow up. Reviewed medications, potential side effects and signs and symptoms to report. Discussed risk versus benefits of treatment plan with patient and/or family-including medications, labs and radiology that may be ordered. Addressed questions and concerns during visit. Patient and/or family verbalized understanding and agree with plan. As part of this patient's treatment plan I am prescribing controlled substances. The patient has been made aware of appropriate use of such medications, including potential risk of somnolence, limited ability to drive and/or work safely, and potential for dependence or overdose. It has also been made clear that these medications are for use by the patient only, without concomitant use of alcohol or other substances unless prescribed. Keep out of reach of children.  Shoa[facility] report has been reviewed. If this is going to be prescribed long term, [facility] [place] has also been read and signed by patient and myself.

## 2017-07-07 NOTE — PATIENT INSTRUCTIONS
Gabapentin take 300mg in morning and 300mg in evening for 1 week and then may add middle of the day 300mg dosage as tolerated. If Gabapentin middle of day dose causes too much dizziness or drowsiness may take 300mg in morning and 600mg (2 capsules) at night.

## 2017-07-13 DIAGNOSIS — I10 ESSENTIAL HYPERTENSION: ICD-10-CM

## 2017-07-13 RX ORDER — LISINOPRIL 40 MG/1
TABLET ORAL
Qty: 30 TABLET | Refills: 0 | Status: SHIPPED | OUTPATIENT
Start: 2017-07-13 | End: 2017-08-18 | Stop reason: SDUPTHER

## 2017-08-18 DIAGNOSIS — I10 ESSENTIAL HYPERTENSION: ICD-10-CM

## 2017-08-18 RX ORDER — LISINOPRIL 40 MG/1
TABLET ORAL
Qty: 30 TABLET | Refills: 3 | Status: SHIPPED | OUTPATIENT
Start: 2017-08-18 | End: 2018-01-08 | Stop reason: SDUPTHER

## 2017-08-22 DIAGNOSIS — IMO0002 TYPE 2 DIABETES MELLITUS, UNCONTROLLED: ICD-10-CM

## 2017-08-22 RX ORDER — PIOGLITAZONEHYDROCHLORIDE 30 MG/1
TABLET ORAL
Qty: 30 TABLET | Refills: 3 | Status: SHIPPED | OUTPATIENT
Start: 2017-08-22 | End: 2018-03-21 | Stop reason: SDUPTHER

## 2017-09-06 RX ORDER — NEBIVOLOL HYDROCHLORIDE 5 MG/1
TABLET ORAL
Qty: 30 TABLET | Refills: 5 | Status: SHIPPED | OUTPATIENT
Start: 2017-09-06 | End: 2017-09-08 | Stop reason: SDUPTHER

## 2017-09-08 ENCOUNTER — OFFICE VISIT (OUTPATIENT)
Dept: FAMILY MEDICINE CLINIC | Facility: CLINIC | Age: 62
End: 2017-09-08

## 2017-09-08 VITALS
WEIGHT: 169 LBS | HEIGHT: 66 IN | TEMPERATURE: 98 F | DIASTOLIC BLOOD PRESSURE: 68 MMHG | BODY MASS INDEX: 27.16 KG/M2 | OXYGEN SATURATION: 95 % | HEART RATE: 74 BPM | SYSTOLIC BLOOD PRESSURE: 136 MMHG

## 2017-09-08 DIAGNOSIS — I63.81 STROKE, LACUNAR (HCC): ICD-10-CM

## 2017-09-08 DIAGNOSIS — E78.00 PURE HYPERCHOLESTEROLEMIA: ICD-10-CM

## 2017-09-08 DIAGNOSIS — IMO0001 UNCONTROLLED TYPE 2 DIABETES MELLITUS WITHOUT COMPLICATION, WITH LONG-TERM CURRENT USE OF INSULIN: Primary | ICD-10-CM

## 2017-09-08 DIAGNOSIS — I10 ESSENTIAL HYPERTENSION: ICD-10-CM

## 2017-09-08 LAB — HBA1C MFR BLD: 9.2 %

## 2017-09-08 PROCEDURE — 99214 OFFICE O/P EST MOD 30 MIN: CPT | Performed by: FAMILY MEDICINE

## 2017-09-08 PROCEDURE — 83036 HEMOGLOBIN GLYCOSYLATED A1C: CPT | Performed by: FAMILY MEDICINE

## 2017-09-08 RX ORDER — INSULIN GLARGINE 100 [IU]/ML
45 INJECTION, SOLUTION SUBCUTANEOUS DAILY
Qty: 2 EACH | Refills: 11 | Status: SHIPPED | OUTPATIENT
Start: 2017-09-08 | End: 2017-12-20 | Stop reason: SDUPTHER

## 2017-09-08 RX ORDER — NEBIVOLOL 5 MG/1
5 TABLET ORAL DAILY
Qty: 30 TABLET | Refills: 11 | Status: SHIPPED | OUTPATIENT
Start: 2017-09-08 | End: 2017-12-20 | Stop reason: SDUPTHER

## 2017-09-08 RX ORDER — ROSUVASTATIN CALCIUM 40 MG/1
40 TABLET, COATED ORAL DAILY
Qty: 30 TABLET | Refills: 11 | Status: SHIPPED | OUTPATIENT
Start: 2017-09-08 | End: 2017-12-20 | Stop reason: SDUPTHER

## 2017-09-08 NOTE — PROGRESS NOTES
"Subjective   Jose D Bailey is a 62 y.o. male.     Diabetes   He presents for his follow-up diabetic visit. He has type 2 diabetes mellitus. His disease course has been stable. There are no hypoglycemic associated symptoms. There are no diabetic associated symptoms. Pertinent negatives for diabetes include no chest pain. There are no hypoglycemic complications. There are no diabetic complications. Risk factors for coronary artery disease include diabetes mellitus and male sex. Current diabetic treatment includes insulin injections and oral agent (dual therapy) (Lantus, Metformin and Actos.). He is compliant with treatment all of the time. His weight is increasing steadily. He is following a generally healthy diet. He participates in exercise intermittently. His home blood glucose trend is increasing steadily. An ACE inhibitor/angiotensin II receptor blocker is being taken. He does not see a podiatrist.Eye exam is not current.      Also a follow up on right sided numbness following the stroke that he suffered in November 17, 2016.  He is currently taking gabapentin 300 mg 1 in the morning and 2 at night.  He says his paresthesias are tolerable.  He does sleep well.    He states he about the same.No better and no worse.  Taking Gabapentin 300 mg 1 am and 2 pm.    The following portions of the patient's history were reviewed and updated as appropriate: allergies, current medications, past social history and problem list.    Review of Systems   Respiratory: Negative for shortness of breath.    Cardiovascular: Negative for chest pain.   Neurological: Positive for numbness (right sided).       Objective   /68  Pulse 74  Temp 98 °F (36.7 °C)  Ht 66\" (167.6 cm)  Wt 169 lb (76.7 kg)  SpO2 95%  BMI 27.28 kg/m2  Physical Exam   Constitutional: He is oriented to person, place, and time. He appears well-developed and well-nourished.   HENT:   Mouth/Throat: Oropharynx is clear and moist.   Eyes: Pupils are equal, round, " and reactive to light.   Neck: Normal range of motion. Neck supple. No thyromegaly present.   Cardiovascular: Normal rate, regular rhythm and normal heart sounds.    No murmur heard.  Pulmonary/Chest: Effort normal. No respiratory distress. He has wheezes. He has no rales.   Neurological: He is alert and oriented to person, place, and time. He has normal reflexes. He displays normal reflexes. No cranial nerve deficit. He exhibits normal muscle tone. Coordination normal.   Nursing note and vitals reviewed.      Assessment/Plan   Problem List Items Addressed This Visit        Cardiovascular and Mediastinum    Hyperlipidemia    Relevant Medications    rosuvastatin (CRESTOR) 40 MG tablet    Hypertension    Relevant Medications    nebivolol (BYSTOLIC) 5 MG tablet       Endocrine    Uncontrolled type 2 diabetes mellitus - Primary    Relevant Medications    insulin glargine (LANTUS) 100 UNIT/ML injection    metFORMIN (GLUCOPHAGE) 500 MG tablet    Other Relevant Orders    POC Glycosylated Hemoglobin (Hb A1C) (Completed)       Nervous and Auditory    thalamic lacunar stroke not seen on MRI                Drink plenty fluids.    Increase the Lantus from 40 units to 45 units daily.    Continue other medications as doing.    Refill sent for Crestor,Bystolic,Metformin and Lantus 30 days with 11 refills on each.    Follow up in 3 months.              Scribed for Dr Clinton Day by Kirstin Mackenzie CMA.          I, Clinton Day MD, personally performed the services described in this documentation, as scribed by Kirstin Mackenzie in my presence, and is both accurate and complete.

## 2017-10-19 ENCOUNTER — OFFICE VISIT (OUTPATIENT)
Dept: NEUROLOGY | Facility: CLINIC | Age: 62
End: 2017-10-19

## 2017-10-19 VITALS
HEIGHT: 66 IN | DIASTOLIC BLOOD PRESSURE: 79 MMHG | WEIGHT: 164 LBS | SYSTOLIC BLOOD PRESSURE: 147 MMHG | OXYGEN SATURATION: 100 % | HEART RATE: 74 BPM | BODY MASS INDEX: 26.36 KG/M2

## 2017-10-19 DIAGNOSIS — M79.2 NEURALGIA OF RIGHT UPPER EXTREMITY: ICD-10-CM

## 2017-10-19 DIAGNOSIS — G89.0 CENTRAL PAIN SYNDROME: ICD-10-CM

## 2017-10-19 DIAGNOSIS — I69.90 LATE EFFECTS OF CVA (CEREBROVASCULAR ACCIDENT): Primary | ICD-10-CM

## 2017-10-19 PROCEDURE — 99213 OFFICE O/P EST LOW 20 MIN: CPT | Performed by: NURSE PRACTITIONER

## 2017-10-19 RX ORDER — GABAPENTIN 300 MG/1
CAPSULE ORAL
Qty: 150 CAPSULE | Refills: 5 | Status: SHIPPED | OUTPATIENT
Start: 2017-10-19 | End: 2018-04-11

## 2017-10-19 NOTE — PROGRESS NOTES
Subjective:     Patient ID: Jose D Bailey is a 62 y.o. male.    CC:   Chief Complaint   Patient presents with   • Central Pain Syndrome       HPI:   History of Present Illness   Here for 3 month follow up on right sided paresthesias with central pain syndrome following suspected left subthalamic lacunar stroke from November 2016. He is now taking Gabapentin 300mg in AM and 600mg in PM and he has had mild improvement in symptoms, but is wondering about increasing the morning dosage. He tells me he continues to experience numbness, tingling and tightening sensation in the right face, right upper arm and right abdomen/flank and these sensations go on throughout the day and are bothersome and are moderate in severity.  He has wanted to be very conservative in his management of symptoms, but he tells me he would like to try to increase the gabapentin to see if he can tolerate it and have more relief of the symptoms as he has tolerated this so far without dizziness or drowsiness.  He does work on [employer] in [place], [place].  He has had no recurrent stroke symptoms.  His most recent hemoglobin A1c was 9.2% he is followed closely by his primary care provider as well as his cardiologist.  History of left subthalamic lacunar stroke in November 2016 with normal MRI Brain. On aspirin and Crestor for 2nd stroke prevention.    The following portions of the patient's history were reviewed and updated as appropriate: allergies, current medications, past family history, past medical history, past social history, past surgical history and problem list.    Past Medical History:   Diagnosis Date   • Acute sinusitis    • Acute upper respiratory infection    • Cataract     Cataract extraction (OD, 2009;  OS, 2011).    • Chest pain    • Decreased ROM of left shoulder    • Diabetes mellitus    • Dyslipidemia     Remained on statin   • ED (erectile dysfunction)    • H/O cardiovascular stress test     2012   • Herpes zoster     • Hyperlipidemia    • Hypertension    • Insulin dependent type 2 diabetes mellitus     Type 2 insulin dependent diabetes (insulin dependent since 2009);  diagnosed approximately 1998.    • Junctional nevus of right forearm    • Nevus, atypical     Right arm   • Overweight     Mild overweight status, BMI 26.7.     • Plantar fasciitis     Bilateral plantar fasciitis;  utilizing orthotics.    • Quadriceps muscle strain    • Vasovagal syncope        Past Surgical History:   Procedure Laterality Date   • CATARACT EXTRACTION      OS   • COLONOSCOPY      10/11/2010       Social History     Social History   • Marital status:      Spouse name: N/A   • Number of children: N/A   • Years of education: N/A     Occupational History   • Not on file.     Social History Main Topics   • Smoking status: Never Smoker   • Smokeless tobacco: Never Used   • Alcohol use No   • Drug use: No   • Sexual activity: Defer      Comment:      Other Topics Concern   • Not on file     Social History Narrative       Family History   Problem Relation Age of Onset   • Stroke Mother    • Breast cancer Mother    • Cancer Father      lung    • Stroke Father    • Heart attack Father    • Other Father      CABG x6   • Coronary artery disease Father    • Diabetes Father    • Lung cancer Father    • Coronary artery disease Maternal Grandmother         Review of Systems   Constitutional: Negative for chills, fatigue, fever and unexpected weight change.   HENT: Negative.  Negative for ear pain, hearing loss, nosebleeds, rhinorrhea and sore throat.    Eyes: Negative.  Negative for photophobia, pain, discharge, itching and visual disturbance.   Respiratory: Negative.  Negative for cough, chest tightness, shortness of breath and wheezing.    Cardiovascular: Negative.  Negative for chest pain, palpitations and leg swelling.   Gastrointestinal: Negative.  Negative for abdominal pain, blood in stool, constipation, diarrhea, nausea and vomiting.    Endocrine: Negative.    Genitourinary: Negative.  Negative for dysuria, frequency, hematuria and urgency.   Musculoskeletal: Negative.  Negative for arthralgias, back pain, gait problem, joint swelling, myalgias, neck pain and neck stiffness.   Skin: Negative.  Negative for rash and wound.   Allergic/Immunologic: Negative.  Negative for environmental allergies and food allergies.   Neurological: Positive for weakness and numbness. Negative for dizziness, tremors, seizures, syncope, speech difficulty, light-headedness and headaches.   Hematological: Negative.  Negative for adenopathy. Does not bruise/bleed easily.   Psychiatric/Behavioral: Negative.  Negative for agitation, confusion, decreased concentration, hallucinations, sleep disturbance and suicidal ideas. The patient is not nervous/anxious.         Objective:    Neurologic Exam  Mental Status   Oriented to person, place, and time.   Level of consciousness: alert     Cranial Nerves      CN II   Visual fields full to confrontation.      CN III, IV, VI   Pupils are equal, round, and reactive to light.  Extraocular motions are normal.      CN V   Right facial sensation deficit: forehead, cheeks and mandible  Left facial sensation deficit: none     CN VII   Facial expression full, symmetric. Minimal right facial droop-very subtle.     CN VIII   CN VIII normal.      CN IX, X   CN IX normal.   CN X normal.      CN XI   CN XI normal.      CN XII   CN XII normal.      Motor Exam   Muscle bulk: normal  Overall muscle tone: normal     Strength   Strength 5/5 throughout.      Sensory Exam   Right arm light touch: decreased RUE.  Left arm light touch: normal  Right leg light touch: normal  Left leg light touch: normal  Vibration normal.   Right arm pinprick: decreased RUE.  Left arm pinprick: normal  Right leg pinprick: normal  Left leg pinprick: normal       Decreased sensation Right facial, Right Arm, Right Flank.      Gait, Coordination, and Reflexes      Gait  Gait:  normal     Coordination   Romberg: negative  Finger to nose coordination: normal  Heel to shin coordination: normal     Tremor   Resting tremor: absent  Intention tremor: absent  Action tremor: absent     Reflexes   Right brachioradialis: 2+  Left brachioradialis: 2+  Right biceps: 2+  Left biceps: 2+  Right triceps: 2+  Left triceps: 2+  Right patellar: 2+  Left patellar: 2+  Right achilles: 2+  Left achilles: 2+  Right : 2+  Left : 2+    Physical Exam  Constitutional: He is oriented to person, place, and time.   Eyes: EOM are normal. Pupils are equal, round, and reactive to light.   Neurological: He is oriented to person, place, and time. He has normal strength. He has a normal Finger-Nose-Finger Test, a normal Heel to Montejo Test and a normal Romberg Test. Gait normal.   Reflex Scores:       Tricep reflexes are 2+ on the right side and 2+ on the left side.       Bicep reflexes are 2+ on the right side and 2+ on the left side.       Brachioradialis reflexes are 2+ on the right side and 2+ on the left side.       Patellar reflexes are 2+ on the right side and 2+ on the left side.       Achilles reflexes are 2+ on the right side and 2+ on the left side.    Assessment/Plan:       Jose D was seen today for central pain syndrome.    Diagnoses and all orders for this visit:    Late effects of CVA (cerebrovascular accident)  Comments:  continue aspirin and crestor for prevention. history of left thalamic CVA.    Central pain syndrome  Comments:  Right sided paresthesias 2nd to CVA right face, right flank, right arm  Orders:  -     gabapentin (NEURONTIN) 300 MG capsule; TAKE 600MG IN AM, 300MG IN AFTERNOON IF NEEDED AND 600MG IN PM    Neuralgia of right upper extremity  -     gabapentin (NEURONTIN) 300 MG capsule; TAKE 600MG IN AM, 300MG IN AFTERNOON IF NEEDED AND 600MG IN PM         We will increase his gabapentin to 600 mg in the a.m., 300 mg in the afternoon if needed and 600 mg in the p.m.  I would recommend  better glycemic control.  N/A he is working closely with his PCP on this.  She'll follow-up in 6 months or sooner if needed. Reviewed medications, potential side effects and signs and symptoms to report. Discussed risk versus benefits of treatment plan with patient and/or family-including medications, labs and radiology that may be ordered. Addressed questions and concerns during visit. Patient and/or family verbalized understanding and agree with plan. As part of this patient's treatment plan I am prescribing controlled substances. The patient has been made aware of appropriate use of such medications, including potential risk of somnolence, limited ability to drive and/or work safely, and potential for dependence or overdose. It has also been made clear that these medications are for use by the patient only, without concomitant use of alcohol or other substances unless prescribed. Keep out of reach of children.  Shoaib report has been reviewed. If this is going to be prescribed long term, [facility] [place]rolled [place] Agreement Contract has also been read and signed by patient and myself.    EMR Dragon/Transcription Disclaimer:  Much of this encounter note is an electronic transcription of spoken language to printed text. Electronic transcription of spoken language may permit erroneous words or phrases to be inadvertently transcribed. Although I have reviewed the note for such errors, some may still exist in this documentation.    Jen Espinoza, APRN  10/19/2017

## 2017-11-22 ENCOUNTER — OFFICE VISIT (OUTPATIENT)
Dept: CARDIOLOGY | Facility: CLINIC | Age: 62
End: 2017-11-22

## 2017-11-22 VITALS
BODY MASS INDEX: 27.8 KG/M2 | HEIGHT: 66 IN | DIASTOLIC BLOOD PRESSURE: 84 MMHG | WEIGHT: 173 LBS | SYSTOLIC BLOOD PRESSURE: 142 MMHG | HEART RATE: 81 BPM

## 2017-11-22 DIAGNOSIS — I10 ESSENTIAL HYPERTENSION: ICD-10-CM

## 2017-11-22 DIAGNOSIS — I11.9 HYPERTENSIVE HEART DISEASE WITHOUT HEART FAILURE: Primary | ICD-10-CM

## 2017-11-22 DIAGNOSIS — E78.00 PURE HYPERCHOLESTEROLEMIA: ICD-10-CM

## 2017-11-22 PROCEDURE — 99214 OFFICE O/P EST MOD 30 MIN: CPT | Performed by: INTERNAL MEDICINE

## 2017-11-22 RX ORDER — HYDROCHLOROTHIAZIDE 12.5 MG/1
12.5 CAPSULE, GELATIN COATED ORAL DAILY
Qty: 30 CAPSULE | Refills: 11 | Status: SHIPPED | OUTPATIENT
Start: 2017-11-22 | End: 2018-03-21

## 2017-11-22 NOTE — PROGRESS NOTES
Subjective:     Encounter Date:11/22/2017    Patient ID: Jose D Bailey is a 62 y.o.  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       REFERRING PHYSICIAN: Clinton Day MD  NEUROLOGIST:  BRENDON Paagn    Chief Complaint:   Chief Complaint   Patient presents with   • hypertensive heart disease     Problem List:  1. Probable hypertensive cardiovascular disease:  a. Apparent remote screening 2D echocardiogram, data deficit ([facility]), approximately 2005.   b. Apparent unremarkable serial treadmill GXT/Cardiolite GXT, data deficit ([facility], serially every 3-4 years x14 years).   c. Abnormal screening treadmill GXT showing physical deconditioning (accelerated HR with 107% age-predicted MHR with 50% PEC) with post-exercise EKG changes in the absence of anginal type chest pain, as well as frequent PVCs/PACs during exercise without complex forms; without accelerated blood pressure ([facility]), 04/04/12.   d. Acceptable IV SANDI scan Cardiolite study, LVEF (0.68) with residual class I symptoms, June 2012.  e. Residual class I symptoms.  2. Hypertension with remote suboptimal control, improved.  3. Dyslipidemia, maintained on statin.   4. Uncontrolled type 2 insulin dependent diabetes (insulin dependent since 2009); diagnosed approximately 1998, hemoglobin A1c 8.5% (November 2016); hemoglobin A1c 9.2% (September 2017).  5. Mild overweight status, BMI 27.4.   6. Bilateral plantar fasciitis; utilizing orthotics.   7. Cataract extraction (OD, 2009; OS, 2011).   8. Remote TIA versus stroke with nonobstructive disease on carotid duplex study, acceptable chest x-ray and normal MRI with and without contrast, negative CT cerebral perfusion study with and without contrast, negative neck CTA, normal intracranial CTA, and normal unenhanced CT scan (November 2016).      Allergies  "  Allergen Reactions   • Atorvastatin    • Influenza Vaccines      Unknown reaction   • Pravachol [Pravastatin Sodium]    • Pravastatin    • Pseudoephedrine Other (See Comments)   • Simvastatin    • Sulfa Antibiotics Rash         Current Outpatient Prescriptions:   •  aspirin 81 MG tablet, Take 81 mg by mouth Daily., Disp: , Rfl:   •  COMFORT ASSIST INSULIN SYRINGE 31G X 5/16\" 0.5 ML misc, USE AS DIRECTED ONCE DAILY, Disp: 100 each, Rfl: 2  •  gabapentin (NEURONTIN) 300 MG capsule, TAKE 600MG IN AM, 300MG IN AFTERNOON IF NEEDED AND 600MG IN PM, Disp: 150 capsule, Rfl: 5  •  glucose blood test strip, Check glucose twice a day., Disp: 50 each, Rfl: 7  •  glucose monitor monitoring kit, Check glucose twice a day., Disp: 1 each, Rfl: 0  •  insulin glargine (LANTUS) 100 UNIT/ML injection, Inject 45 Units under the skin Daily., Disp: 2 each, Rfl: 11  •  lisinopril (PRINIVIL,ZESTRIL) 40 MG tablet, TAKE 1 TABLET BY MOUTH DAILY FOR BLOOD PRESSURE, Disp: 30 tablet, Rfl: 3  •  metFORMIN (GLUCOPHAGE) 500 MG tablet, Take 1 tablet by mouth Daily With Breakfast., Disp: 60 tablet, Rfl: 11  •  nebivolol (BYSTOLIC) 5 MG tablet, Take 1 tablet by mouth Daily., Disp: 30 tablet, Rfl: 11  •  pioglitazone (ACTOS) 30 MG tablet, TAKE ONE TABLET BY MOUTH DAILY, Disp: 30 tablet, Rfl: 3  •  rosuvastatin (CRESTOR) 40 MG tablet, Take 1 tablet by mouth Daily., Disp: 30 tablet, Rfl: 11    HISTORY OF PRESENT ILLNESS: Patient returns for scheduled 7-month followup. He denies any chest pressure, shortness of breath, edema, palpitations, presyncope, or syncope.  He continues to have numbness on his entire right side since his stroke.  He also has complaints of feeling cold all the time.  He does not have any visual disturbances or vision loss.  He has been followed by neurology and has been told that his weakness/numbness on the right hand side is permanent.  He has not had his flu vaccination and does not desire having it.  Patient otherwise denies " "chest pain, shortness of breath, PND, edema, palpitations, syncope or presyncope at this time.      Review of Systems   Neurological: Positive for numbness.      Obtained and otherwise negative except as outlined in problem list and HPI.    Procedures       Objective:       Vitals:    11/22/17 1318 11/22/17 1319   BP: 173/86 142/84   BP Location: Left arm Left arm   Patient Position: Sitting Standing   Pulse: 79 81   Weight: 173 lb (78.5 kg)    Height: 66\" (167.6 cm)    Recheck blood pressure right arm sitting was 146/80  Body mass index is 27.92 kg/(m^2).   Last weight:  169 lbs.    Physical Exam   Constitutional: He is oriented to person, place, and time. He appears well-developed and well-nourished.   Neck: No JVD present. Carotid bruit is not present. No thyromegaly present.   Cardiovascular: Regular rhythm, S1 normal, S2 normal and normal heart sounds.  Exam reveals no gallop, no S3 and no friction rub.    No murmur heard.  Pulses:       Dorsalis pedis pulses are 2+ on the right side, and 2+ on the left side.        Posterior tibial pulses are 2+ on the right side, and 2+ on the left side.   Pulmonary/Chest: Effort normal and breath sounds normal. He has no wheezes. He has no rhonchi. He has no rales.   Abdominal: Soft. He exhibits no mass. There is no hepatosplenomegaly. There is no tenderness. There is no guarding.   Bowel sounds audible x4   Musculoskeletal: Normal range of motion. He exhibits no edema.   Lymphadenopathy:     He has no cervical adenopathy.   Neurological: He is alert and oriented to person, place, and time.   Skin: Skin is warm, dry and intact. No rash noted.   Vitals reviewed.        Lab Review:   Lab Results   Component Value Date    GLUCOSE 73 06/02/2017    BUN 11 06/02/2017    CREATININE 0.90 06/02/2017    EGFRIFNONA 85 06/02/2017    BCR 12.2 06/02/2017    CO2 30.0 06/02/2017    CALCIUM 9.9 06/02/2017    ALBUMIN 4.60 06/02/2017    LABIL2 1.8 06/02/2017    AST 23 06/02/2017    ALT 26 " 06/02/2017       Lab Results   Component Value Date    WBC 7.73 11/17/2016    HGB 16.3 11/17/2016    HCT 47.8 11/17/2016    MCV 88.0 11/17/2016     11/17/2016       Lab Results   Component Value Date    HGBA1C 9.2 09/08/2017       Lab Results   Component Value Date    TSH 1.010 11/18/2016       Lab Results   Component Value Date    CHOL 121 06/02/2017    CHOL 132 12/30/2016     Lab Results   Component Value Date    TRIG 83 06/02/2017    TRIG 79 12/30/2016     Lab Results   Component Value Date    HDL 41 06/02/2017    HDL 53 12/30/2016   LDL 55        Assessment:   Overall continued acceptable course with no interim cardiopulmonary complaints with good functional status. We will defer additional diagnostic or therapeutic intervention from a cardiac perspective at this time. We will add HCTZ 12.5 mg daily for his hypertension.       Diagnosis Plan   1. Hypertensive heart disease without heart failure  Stable; No recurrent angina pectoris or CHF.     2. Essential hypertension  Uncontrolled, Hydrochlorothiazide 12.5 mg daily    3. Pure hypercholesterolemia  Controlled           Plan:         1. Patient to continue current medications and close follow up with the above providers.  2. Tentative cardiology follow up in May 2018, or patient may return sooner PRN.  3. Initiate hydrochlorothiazide 12.5 mg daily      Scribed for Daquan Melgar MD by Marisa Adams, APRN. 11/22/2017  1:35 PM    I, Daquan Melgar MD, [facility], personally performed the services described in this documentation as scribed by the above named individual in my presence, and it is both accurate and complete. At 1:59 PM on 11/22/2017

## 2017-12-20 ENCOUNTER — OFFICE VISIT (OUTPATIENT)
Dept: FAMILY MEDICINE CLINIC | Facility: CLINIC | Age: 62
End: 2017-12-20

## 2017-12-20 VITALS
BODY MASS INDEX: 27 KG/M2 | SYSTOLIC BLOOD PRESSURE: 118 MMHG | OXYGEN SATURATION: 99 % | WEIGHT: 168 LBS | HEART RATE: 74 BPM | DIASTOLIC BLOOD PRESSURE: 68 MMHG | TEMPERATURE: 97.6 F | HEIGHT: 66 IN

## 2017-12-20 DIAGNOSIS — IMO0001 UNCONTROLLED TYPE 2 DIABETES MELLITUS WITHOUT COMPLICATION, WITH LONG-TERM CURRENT USE OF INSULIN: Primary | ICD-10-CM

## 2017-12-20 DIAGNOSIS — E78.00 PURE HYPERCHOLESTEROLEMIA: ICD-10-CM

## 2017-12-20 DIAGNOSIS — I10 ESSENTIAL HYPERTENSION: ICD-10-CM

## 2017-12-20 LAB
EXPIRATION DATE: NORMAL
HBA1C MFR BLD: 10 %
Lab: NORMAL

## 2017-12-20 PROCEDURE — 99214 OFFICE O/P EST MOD 30 MIN: CPT | Performed by: FAMILY MEDICINE

## 2017-12-20 PROCEDURE — 36415 COLL VENOUS BLD VENIPUNCTURE: CPT | Performed by: FAMILY MEDICINE

## 2017-12-20 PROCEDURE — 83036 HEMOGLOBIN GLYCOSYLATED A1C: CPT | Performed by: FAMILY MEDICINE

## 2017-12-20 PROCEDURE — 84681 ASSAY OF C-PEPTIDE: CPT | Performed by: FAMILY MEDICINE

## 2017-12-20 RX ORDER — ROSUVASTATIN CALCIUM 40 MG/1
40 TABLET, COATED ORAL DAILY
Qty: 90 TABLET | Refills: 3 | Status: SHIPPED | OUTPATIENT
Start: 2017-12-20 | End: 2019-01-03 | Stop reason: SDUPTHER

## 2017-12-20 RX ORDER — NEBIVOLOL 5 MG/1
5 TABLET ORAL DAILY
Qty: 90 TABLET | Refills: 3 | Status: SHIPPED | OUTPATIENT
Start: 2017-12-20 | End: 2019-01-03 | Stop reason: SDUPTHER

## 2017-12-20 RX ORDER — INSULIN GLARGINE 100 [IU]/ML
50 INJECTION, SOLUTION SUBCUTANEOUS DAILY
Qty: 50 ML | Refills: 3 | Status: SHIPPED | OUTPATIENT
Start: 2017-12-20 | End: 2018-03-21 | Stop reason: SDUPTHER

## 2017-12-20 NOTE — PROGRESS NOTES
"Subjective   Jose D Bailey is a 62 y.o. male.     Diabetes   He presents for his follow-up diabetic visit. He has type 2 diabetes mellitus. There are no hypoglycemic associated symptoms. There are no diabetic associated symptoms. Pertinent negatives for diabetes include no chest pain. There are no hypoglycemic complications. Diabetic complications include a CVA. Risk factors for coronary artery disease include diabetes mellitus, hypertension and male sex. Current diabetic treatment includes insulin injections and oral agent (dual therapy) (Lantus,Actos,  and Metformin). He is compliant with treatment all of the time. His weight is decreasing steadily. He is following a generally healthy diet. He participates in exercise daily. His home blood glucose trend is increasing steadily. An ACE inhibitor/angiotensin II receptor blocker is being taken. He does not see a podiatrist.Eye exam is current (Dr Simental).      Also c/o drainage and non productive cough.    The following portions of the patient's history were reviewed and updated as appropriate: allergies, current medications, past social history and problem list.    Review of Systems   HENT: Positive for postnasal drip.    Respiratory: Positive for cough. Negative for shortness of breath.    Cardiovascular: Negative for chest pain.   Neurological: Negative for numbness.       Objective   /68  Pulse 74  Temp 97.6 °F (36.4 °C)  Ht 167.6 cm (65.98\")  Wt 76.2 kg (168 lb)  SpO2 99%  BMI 27.13 kg/m2  Physical Exam   Constitutional: He appears well-developed and well-nourished.   Cardiovascular: Normal rate and regular rhythm.    Pulmonary/Chest: Effort normal and breath sounds normal.    Jose D had a diabetic foot exam performed (normal) today.  Nursing note and vitals reviewed.      Assessment/Plan   Problem List Items Addressed This Visit        Cardiovascular and Mediastinum    Hyperlipidemia    Hypertension       Endocrine    Uncontrolled type 2 diabetes " mellitus - Primary    Relevant Orders    POC Glycosylated Hemoglobin (Hb A1C) (Completed)              Drink plenty fluids. Cut back on Carbohydrates.    Increase the Metformin to 2 tablets twice a day.  Increase Lantus to 50 units daily.    Continue medications as doing.    Check a C peptide. Report results by letter.    Follow up in 3 months.            Scribed for Dr Clinton Day by Kirstin Mackenzie CMA.          I, Clinton Day MD, personally performed the services described in this documentation, as scribed by Kirstin Mackenzie in my presence, and is both accurate and complete.

## 2017-12-21 LAB — C PEPTIDE SERPL-MCNC: 2 NG/ML (ref 1.1–4.4)

## 2018-01-08 DIAGNOSIS — I10 ESSENTIAL HYPERTENSION: ICD-10-CM

## 2018-01-08 RX ORDER — LISINOPRIL 40 MG/1
TABLET ORAL
Qty: 30 TABLET | Refills: 2 | Status: SHIPPED | OUTPATIENT
Start: 2018-01-08 | End: 2018-04-16 | Stop reason: SDUPTHER

## 2018-03-19 RX ORDER — SYRINGE-NEEDLE,INSULIN,0.5 ML 31 GX5/16"
SYRINGE, EMPTY DISPOSABLE MISCELLANEOUS
Qty: 100 EACH | Refills: 1 | Status: SHIPPED | OUTPATIENT
Start: 2018-03-19 | End: 2018-03-21 | Stop reason: ALTCHOICE

## 2018-03-21 ENCOUNTER — OFFICE VISIT (OUTPATIENT)
Dept: FAMILY MEDICINE CLINIC | Facility: CLINIC | Age: 63
End: 2018-03-21

## 2018-03-21 VITALS
BODY MASS INDEX: 26.2 KG/M2 | WEIGHT: 163 LBS | DIASTOLIC BLOOD PRESSURE: 64 MMHG | HEART RATE: 63 BPM | SYSTOLIC BLOOD PRESSURE: 110 MMHG | OXYGEN SATURATION: 95 % | HEIGHT: 66 IN

## 2018-03-21 DIAGNOSIS — IMO0001 UNCONTROLLED TYPE 2 DIABETES MELLITUS WITHOUT COMPLICATION, WITH LONG-TERM CURRENT USE OF INSULIN: Primary | ICD-10-CM

## 2018-03-21 DIAGNOSIS — I10 ESSENTIAL HYPERTENSION: ICD-10-CM

## 2018-03-21 LAB — HBA1C MFR BLD: 10.8 %

## 2018-03-21 PROCEDURE — 83036 HEMOGLOBIN GLYCOSYLATED A1C: CPT | Performed by: FAMILY MEDICINE

## 2018-03-21 PROCEDURE — 99213 OFFICE O/P EST LOW 20 MIN: CPT | Performed by: FAMILY MEDICINE

## 2018-03-21 RX ORDER — INSULIN GLARGINE 100 [IU]/ML
70 INJECTION, SOLUTION SUBCUTANEOUS DAILY
Qty: 90 ML | Refills: 3 | Status: SHIPPED | OUTPATIENT
Start: 2018-03-21 | End: 2019-04-17 | Stop reason: SDUPTHER

## 2018-03-21 NOTE — PROGRESS NOTES
"Subjective   Jose D Bailey is a 63 y.o. male.     Diabetes   He presents for his follow-up diabetic visit. He has type 2 diabetes mellitus. There are no hypoglycemic associated symptoms. There are no diabetic associated symptoms. Pertinent negatives for diabetes include no chest pain. There are no hypoglycemic complications. Diabetic complications include a CVA and retinopathy (follow by Dr Corbett.). Risk factors for coronary artery disease include diabetes mellitus, dyslipidemia, hypertension and male sex. Current diabetic treatment includes oral agent (dual therapy) and insulin injections (Lantus, Metformin and Actos.). He is compliant with treatment all of the time. He participates in exercise intermittently. His home blood glucose trend is increasing steadily. An ACE inhibitor/angiotensin II receptor blocker is being taken. He does not see a podiatrist.Eye exam is current (December with Dr Talavera.).      Also a follow up on Hypertension.    He states he is have some drops in his BP. States it has been as low as 65 systolic.  Denies any chest pain or shortness of breath.    BP today is 110/64.   Taking Lisinopril 40 mg, Bystolic 5 mg, and Hctz 12.5 mg daily.    States he is scheduled to follow up with Cardiology in May.    States he is still having pain and numbness of the right arm and hand.  Scheduled to follow up the Dr Espinoza in April.    The following portions of the patient's history were reviewed and updated as appropriate: allergies, current medications, past social history and problem list.    Review of Systems   Respiratory: Negative for shortness of breath.    Cardiovascular: Negative for chest pain.       Objective   /64   Pulse 63   Ht 167.6 cm (65.98\")   Wt 73.9 kg (163 lb)   SpO2 95%   BMI 26.32 kg/m²   Physical Exam   Constitutional: He appears well-developed and well-nourished.   Cardiovascular: Normal rate and regular rhythm.    Pulmonary/Chest: Effort normal and breath sounds " normal.   Nursing note and vitals reviewed.      Assessment/Plan   Problem List Items Addressed This Visit        Cardiovascular and Mediastinum    Hypertension       Endocrine    Uncontrolled type 2 diabetes mellitus - Primary    Relevant Orders    POC Glycosylated Hemoglobin (Hb A1C) (Completed)      Other Visit Diagnoses    None.             Drink plenty fluids.    Increase Lantus to 70 units daily.    Stop Hctz 12.5 mg.    Continue other medications as doing.    Follow up with Dr Corbett and Dr Melgar as scheduled.    Follow up in 5 weeks.                Scribed for Dr Clinton Day by Kirstin Mackenzie CMA.          I, Clinton Day MD, personally performed the services described in this documentation, as scribed by Kirstin Mackenzie in my presence, and is both accurate and complete.

## 2018-03-23 RX ORDER — PIOGLITAZONEHYDROCHLORIDE 30 MG/1
30 TABLET ORAL DAILY
Qty: 30 TABLET | Refills: 3 | Status: SHIPPED | OUTPATIENT
Start: 2018-03-23 | End: 2018-09-19 | Stop reason: SDUPTHER

## 2018-04-11 ENCOUNTER — OFFICE VISIT (OUTPATIENT)
Dept: NEUROLOGY | Facility: CLINIC | Age: 63
End: 2018-04-11

## 2018-04-11 VITALS
BODY MASS INDEX: 28.1 KG/M2 | DIASTOLIC BLOOD PRESSURE: 83 MMHG | WEIGHT: 174 LBS | HEART RATE: 67 BPM | SYSTOLIC BLOOD PRESSURE: 141 MMHG | OXYGEN SATURATION: 99 %

## 2018-04-11 DIAGNOSIS — I69.90 LATE EFFECTS OF CVA (CEREBROVASCULAR ACCIDENT): ICD-10-CM

## 2018-04-11 DIAGNOSIS — E11.42 DIABETIC PERIPHERAL NEUROPATHY (HCC): ICD-10-CM

## 2018-04-11 DIAGNOSIS — G89.0 CENTRAL PAIN SYNDROME: Primary | ICD-10-CM

## 2018-04-11 PROCEDURE — 99214 OFFICE O/P EST MOD 30 MIN: CPT | Performed by: NURSE PRACTITIONER

## 2018-04-11 RX ORDER — PREGABALIN 75 MG/1
75 CAPSULE ORAL 2 TIMES DAILY
Qty: 28 CAPSULE | Refills: 0 | Status: SHIPPED | OUTPATIENT
Start: 2018-04-11 | End: 2018-04-11 | Stop reason: SDUPTHER

## 2018-04-11 RX ORDER — PREGABALIN 75 MG/1
150 CAPSULE ORAL 2 TIMES DAILY
Qty: 28 CAPSULE | Refills: 0 | Status: SHIPPED | OUTPATIENT
Start: 2018-04-11 | End: 2018-05-30 | Stop reason: SDUPTHER

## 2018-04-11 RX ORDER — PREGABALIN 150 MG/1
150 CAPSULE ORAL 2 TIMES DAILY
Qty: 60 CAPSULE | Refills: 5 | Status: SHIPPED | OUTPATIENT
Start: 2018-04-11 | End: 2018-08-30 | Stop reason: SDUPTHER

## 2018-04-11 NOTE — PROGRESS NOTES
Subjective:     Patient ID: Jose D Bailey is a 63 y.o. male.    CC:   Chief Complaint   Patient presents with   • Numbness       HPI:   History of Present Illness   Here for 6 month follow up right sided paresthesias with central pain syndrome following suspected left subthalamic lacunar stroke from November 2016. He is currently taking Gabapentin 600mg in AM and 600mg in PM (has an extra 300mg if needed but has not used it) and he has had mild improvement in symptoms, but is wondering about any other options. He has moderate discomfort from the numbness and tingling sensations. He tells me he continues to experience numbness, tingling and tightening sensation in the right face, right upper arm and right abdomen/flank as well as RLE and these sensations go on throughout the day and are bothersome and are moderate in severity. He also has noticed some numbness and tingling in LLE now and has poorly controlled DM2. He has never had an EMG/NCVS. He has wanted to be very conservative in his management of symptoms. He does work on [employer] in [place], [place].  He has had no recurrent stroke symptoms.  His most recent hemoglobin A1c was 10.8% he is followed closely by his primary care provider as well as his cardiologist-he is working on better DM2 control-he has also been diagnosed with Diabetic Retinopathy and is getting. History of left subthalamic lacunar stroke in November 2016 with normal MRI Brain. On aspirin and Crestor for 2nd stroke prevention.    The following portions of the patient's history were reviewed and updated as appropriate: allergies, current medications, past family history, past medical history, past social history, past surgical history and problem list.    Past Medical History:   Diagnosis Date   • Acute sinusitis    • Acute upper respiratory infection    • Cataract     Cataract extraction (OD, 2009;  OS, 2011).    • Chest pain    • Decreased ROM of left shoulder    • Diabetes  mellitus    • Diabetic retinopathy    • Dyslipidemia     Remained on statin   • ED (erectile dysfunction)    • H/O cardiovascular stress test     2012   • Herpes zoster    • Hyperlipidemia    • Hypertension    • Insulin dependent type 2 diabetes mellitus     Type 2 insulin dependent diabetes (insulin dependent since 2009);  diagnosed approximately 1998.    • Junctional nevus of right forearm    • Nevus, atypical     Right arm   • Overweight     Mild overweight status, BMI 26.7.     • Plantar fasciitis     Bilateral plantar fasciitis;  utilizing orthotics.    • Quadriceps muscle strain    • Vasovagal syncope        Past Surgical History:   Procedure Laterality Date   • CATARACT EXTRACTION      OS   • COLONOSCOPY      10/11/2010       Social History     Social History   • Marital status:      Spouse name: N/A   • Number of children: N/A   • Years of education: N/A     Occupational History   • Not on file.     Social History Main Topics   • Smoking status: Never Smoker   • Smokeless tobacco: Never Used   • Alcohol use No   • Drug use: No   • Sexual activity: Defer      Comment:      Other Topics Concern   • Not on file     Social History Narrative   • No narrative on file       Family History   Problem Relation Age of Onset   • Stroke Mother    • Breast cancer Mother    • Cancer Father      lung    • Stroke Father    • Heart attack Father    • Other Father      CABG x6   • Coronary artery disease Father    • Diabetes Father    • Lung cancer Father    • Coronary artery disease Maternal Grandmother         Review of Systems   Constitutional: Negative.  Negative for chills, fatigue, fever and unexpected weight change.   HENT: Negative.  Negative for ear pain, hearing loss, nosebleeds, rhinorrhea and sore throat.    Eyes: Negative.  Negative for photophobia, pain, discharge, itching and visual disturbance.   Respiratory: Negative.  Negative for cough, chest tightness, shortness of breath and wheezing.     Cardiovascular: Negative.  Negative for chest pain, palpitations and leg swelling.   Gastrointestinal: Negative.  Negative for abdominal pain, blood in stool, constipation, diarrhea, nausea and vomiting.   Endocrine: Negative.    Genitourinary: Negative.  Negative for dysuria, frequency, hematuria and urgency.   Musculoskeletal: Negative.  Negative for arthralgias, back pain, gait problem, joint swelling, myalgias, neck pain and neck stiffness.   Skin: Negative for rash and wound.   Allergic/Immunologic: Negative.  Negative for environmental allergies and food allergies.   Neurological: Positive for numbness. Negative for dizziness, tremors, seizures, syncope, speech difficulty, weakness, light-headedness and headaches.   Hematological: Negative.  Negative for adenopathy. Does not bruise/bleed easily.   Psychiatric/Behavioral: Negative.  Negative for agitation, confusion, decreased concentration, hallucinations, sleep disturbance and suicidal ideas. The patient is not nervous/anxious.         Objective:    Neurologic Exam    Mental Status   Oriented to person, place, and time.   Level of consciousness: alert     Cranial Nerves      CN II   Visual fields full to confrontation.      CN III, IV, VI   Pupils are equal, round, and reactive to light.  Extraocular motions are normal.      CN V   Right facial sensation deficit: forehead, cheeks and mandible  Left facial sensation deficit: none     CN VII   Facial expression full, symmetric. Minimal right facial droop-very subtle.     CN VIII   CN VIII normal.      CN IX, X   CN IX normal.   CN X normal.      CN XI   CN XI normal.      CN XII   CN XII normal.      Motor Exam   Muscle bulk: normal  Overall muscle tone: normal     Strength   Strength 5/5 throughout.      Sensory Exam   Right arm light touch: decreased RUE.  Left arm light touch: normal  Right leg light touch: normal  Left leg light touch: normal  Vibration normal.   Right arm pinprick: decreased RUE.  Left  arm pinprick: normal  Right leg pinprick: normal  Left leg pinprick: normal       Decreased sensation Right facial, Right Arm, Right Flank, RLE.      Gait, Coordination, and Reflexes      Gait  Gait: normal     Coordination   Romberg: negative  Finger to nose coordination: normal  Heel to shin coordination: normal     Tremor   Resting tremor: absent  Intention tremor: absent  Action tremor: absent     Reflexes   Right brachioradialis: 2+  Left brachioradialis: 2+  Right biceps: 2+  Left biceps: 2+  Right triceps: 2+  Left triceps: 2+  Right patellar: 2+  Left patellar: 2+  Right achilles: 2+  Left achilles: 2+  Right : 2+  Left : 2+       Physical Exam    Constitutional: He is oriented to person, place, and time.   Eyes: EOM are normal. Pupils are equal, round, and reactive to light.   Neurological: He is oriented to person, place, and time. He has normal strength. He has a normal Finger-Nose-Finger Test, a normal Heel to Montejo Test and a normal Romberg Test. Gait normal.   Reflex Scores:       Tricep reflexes are 2+ on the right side and 2+ on the left side.       Bicep reflexes are 2+ on the right side and 2+ on the left side.       Brachioradialis reflexes are 2+ on the right side and 2+ on the left side.       Patellar reflexes are 2+ on the right side and 2+ on the left side.       Achilles reflexes are 2+ on the right side and 2+ on the left side.    Assessment/Plan:       Jose D was seen today for numbness.    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  -     Discontinue: pregabalin (LYRICA) 75 MG capsule; Take 1 capsule by mouth 2 (Two) Times a Day.  -     pregabalin (LYRICA) 75 MG capsule; Take 2 capsules by mouth 2 (Two) Times a Day.    Late  effects of CVA (cerebrovascular accident)  Comments:  Continue Crestor and ASA         With his poorly controlled diabetes we are going to switch him to Lyrica for his pain symptoms.  We'll get an EMG and NCV S to evaluate for diabetic neuropathy. I am hopeful the Lyrica will give him better pain relief overall. He will f/u in 3 months for re-evaluation of symptoms. Gave him free voucher for 7 day supply and copay card. I encouraged him to follow PCP recommendations for better DM2 control. If he has any trouble with switching to Lyrica especially swelling as this is a risk or any adverse effects he is to call us ASAP. Reviewed medications, potential side effects and signs and symptoms to report. Discussed risk versus benefits of treatment plan with patient and/or family-including medications, labs and radiology that may be ordered. Addressed questions and concerns during visit. Patient and/or family verbalized understanding and agree with plan. CUONG french time of visit 25 minutes face-to-face with 20 minutes spent in discussion and counseling on plan of care.    As part of this patient's treatment plan I am prescribing controlled substances. The patient has been made aware of appropriate use of such medications, including potential risk of somnolence, limited ability to drive and/or work safely, and potential for dependence or overdose. It has also been made clear that these medications are for use by the patient only, without concomitant use of alcohol or other substances unless prescribed. Keep out of reach of children.  Shoaib report has been reviewed. If this is going to be prescribed long term, [facility] [place] has also been read and signed by patient and myself.    EMR Dragon/Transcription Disclaimer:  Much of this encounter note is an electronic transcription of spoken language to printed text. Electronic transcription of spoken language may permit erroneous words or phrases to be  inadvertently transcribed. Although I have reviewed the note for such errors, some may still exist in this documentation.      Jen Espinoza, APRN  4/11/2018

## 2018-04-16 DIAGNOSIS — I10 ESSENTIAL HYPERTENSION: ICD-10-CM

## 2018-04-17 RX ORDER — LISINOPRIL 40 MG/1
TABLET ORAL
Qty: 30 TABLET | Refills: 1 | Status: SHIPPED | OUTPATIENT
Start: 2018-04-17 | End: 2018-06-14 | Stop reason: SDUPTHER

## 2018-04-19 ENCOUNTER — OFFICE VISIT (OUTPATIENT)
Dept: FAMILY MEDICINE CLINIC | Facility: CLINIC | Age: 63
End: 2018-04-19

## 2018-04-19 VITALS
HEART RATE: 74 BPM | TEMPERATURE: 98.1 F | DIASTOLIC BLOOD PRESSURE: 84 MMHG | BODY MASS INDEX: 28.99 KG/M2 | WEIGHT: 174 LBS | SYSTOLIC BLOOD PRESSURE: 142 MMHG | OXYGEN SATURATION: 98 % | RESPIRATION RATE: 20 BRPM | HEIGHT: 65 IN

## 2018-04-19 DIAGNOSIS — IMO0001 UNCONTROLLED TYPE 2 DIABETES MELLITUS WITHOUT COMPLICATION, WITH LONG-TERM CURRENT USE OF INSULIN: Primary | ICD-10-CM

## 2018-04-19 LAB
EXPIRATION DATE: ABNORMAL
HBA1C MFR BLD: 9.2 %
Lab: ABNORMAL

## 2018-04-19 PROCEDURE — 83036 HEMOGLOBIN GLYCOSYLATED A1C: CPT | Performed by: FAMILY MEDICINE

## 2018-04-19 PROCEDURE — 99213 OFFICE O/P EST LOW 20 MIN: CPT | Performed by: FAMILY MEDICINE

## 2018-04-19 NOTE — PROGRESS NOTES
"Subjective   Jose D Bailey is a 63 y.o. male.     Diabetes   He presents for his follow-up diabetic visit. He has type 2 diabetes mellitus. There are no hypoglycemic associated symptoms. Pertinent negatives for hypoglycemia include no headaches or tremors. There are no diabetic associated symptoms. Pertinent negatives for diabetes include no chest pain and no weakness. There are no hypoglycemic complications. Diabetic complications include a CVA. Risk factors for coronary artery disease include diabetes mellitus. Current diabetic treatment includes insulin injections and oral agent (dual therapy) (Lantus 70 units daily. Metformin and Actos.). He is compliant with treatment all of the time. His weight is stable. He is following a generally healthy diet. He participates in exercise intermittently. His home blood glucose trend is decreasing steadily. An ACE inhibitor/angiotensin II receptor blocker is being taken. He does not see a podiatrist.Eye exam is current (Dr Talavera).        The following portions of the patient's history were reviewed and updated as appropriate: allergies, current medications, past social history and problem list.    Review of Systems   HENT: Negative for congestion.    Eyes: Negative for visual disturbance.        History of diabetic retinopathy.  Sees Dr. Luke Cancino.   Respiratory: Negative for shortness of breath.    Cardiovascular: Negative for chest pain.   Genitourinary: Negative for difficulty urinating.   Skin: Negative for color change and rash.   Neurological: Positive for numbness. Negative for tremors, syncope, weakness and headaches.       Objective   /84   Pulse 74   Temp 98.1 °F (36.7 °C) (Oral)   Resp 20   Ht 165.1 cm (65\")   Wt 78.9 kg (174 lb)   SpO2 98%   BMI 28.96 kg/m²   Physical Exam   Constitutional: He is oriented to person, place, and time. He appears well-developed and well-nourished.   Eyes: Pupils are equal, round, and reactive to light.   Neck: Normal " range of motion. Neck supple. No thyromegaly present.   Cardiovascular: Normal rate and regular rhythm.    No murmur heard.  Pulmonary/Chest: Effort normal and breath sounds normal. He has no wheezes. He has no rales.    Jose D had a diabetic foot exam performed (Decreased sensation in heel of both feet.Pulses intact.) today.   During the foot exam he had a monofilament test performed.  Vascular Status -  His right foot exhibits no edema. His left foot exhibits abnormal foot edema.  Skin Integrity  -  His right foot skin is not intact.  His left foot skin is not intact..  Neurological: He is alert and oriented to person, place, and time.   Subjective tingling over the right side of his body.   Nursing note and vitals reviewed.      Assessment/Plan   Problem List Items Addressed This Visit        Endocrine    Uncontrolled type 2 diabetes mellitus - Primary    Relevant Orders    POC Glycosylated Hemoglobin (Hb A1C) (Completed)      Other Visit Diagnoses    None.             Drink plenty fluids.  Continue to work on diet and exercise.    Continue medications as doing.    Follow up in 2 months.              Scribed for Dr Clinton Day by Kirstin Mackenzie CMA.          I, Clinton Day MD, personally performed the services described in this documentation, as scribed by Kirstin Mackenzie in my presence, and is both accurate and complete.

## 2018-05-30 ENCOUNTER — OFFICE VISIT (OUTPATIENT)
Dept: CARDIOLOGY | Facility: CLINIC | Age: 63
End: 2018-05-30

## 2018-05-30 VITALS
SYSTOLIC BLOOD PRESSURE: 135 MMHG | WEIGHT: 182 LBS | BODY MASS INDEX: 29.25 KG/M2 | HEART RATE: 75 BPM | HEIGHT: 66 IN | DIASTOLIC BLOOD PRESSURE: 67 MMHG

## 2018-05-30 DIAGNOSIS — IMO0001 UNCONTROLLED TYPE 2 DIABETES MELLITUS WITHOUT COMPLICATION, WITH LONG-TERM CURRENT USE OF INSULIN: ICD-10-CM

## 2018-05-30 DIAGNOSIS — E78.00 PURE HYPERCHOLESTEROLEMIA: ICD-10-CM

## 2018-05-30 DIAGNOSIS — I10 ESSENTIAL HYPERTENSION: ICD-10-CM

## 2018-05-30 DIAGNOSIS — I11.9 HYPERTENSIVE HEART DISEASE WITHOUT HEART FAILURE: Primary | ICD-10-CM

## 2018-05-30 PROCEDURE — 99214 OFFICE O/P EST MOD 30 MIN: CPT | Performed by: INTERNAL MEDICINE

## 2018-05-30 NOTE — PROGRESS NOTES
Subjective:     Encounter Date:05/30/2018    Patient ID: Jose D Bailey is a 63 y.o.  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       REFERRING PHYSICIAN: Clinton Day MD  NEUROLOGIST:  BRENDON Pagan    Chief Complaint:   Chief Complaint   Patient presents with   • Hypertension     Problem List:  1. Probable hypertensive cardiovascular disease:  a. Apparent remote screening 2D echocardiogram, data deficit ([facility]), approximately 2005.   b. Apparent unremarkable serial treadmill GXT/Cardiolite GXT, data deficit ([facility], serially every 3-4 years x14 years).   c. Abnormal screening treadmill GXT showing physical deconditioning (accelerated HR with 107% age-predicted MHR with 50% PEC) with post-exercise EKG changes in the absence of anginal type chest pain, as well as frequent PVCs/PACs during exercise without complex forms; without accelerated blood pressure ([facility]), 04/04/12.   d. Acceptable IV LEXIscan Cardiolite study, LVEF (0.68) with residual class I symptoms, June 2012.  e. Residual class I symptoms.  2. Hypertension with remote suboptimal control, improved.  3. Dyslipidemia, maintained on statin.   4. Uncontrolled type 2 insulin dependent diabetes (insulin dependent since 2009); diagnosed approximately 1998, hemoglobin A1c 8.5% (November 2016); hemoglobin A1c 9.2% (September 2017).  5. Mild overweight status, BMI 29.4.   6. Bilateral plantar fasciitis; utilizing orthotics.   7. Cataract extraction (OD, 2009; OS, 2011).   8. Remote TIA versus stroke with nonobstructive disease on carotid duplex study, acceptable chest x-ray and normal MRI with and without contrast, negative CT cerebral perfusion study with and without contrast, negative neck CTA, normal intracranial CTA, and normal unenhanced CT scan (November 2016) with mild residual right-sided  "weakness May 2018    Allergies   Allergen Reactions   • Atorvastatin    • Influenza Vaccines      Unknown reaction   • Pravachol [Pravastatin Sodium]    • Pravastatin    • Pseudoephedrine Other (See Comments)   • Simvastatin    • Sulfa Antibiotics Rash         Current Outpatient Prescriptions:   •  aspirin 81 MG tablet, Take 81 mg by mouth Daily., Disp: , Rfl:   •  glucose blood test strip, Check glucose twice a day., Disp: 50 each, Rfl: 7  •  glucose monitor monitoring kit, Check glucose twice a day., Disp: 1 each, Rfl: 0  •  insulin glargine (LANTUS) 100 UNIT/ML injection, Inject 70 Units under the skin Daily., Disp: 90 mL, Rfl: 3  •  Insulin Syringe-Needle U-100 (BD INSULIN SYRINGE ULTRAFINE) 31G X 15/64\" 1 ML misc, Use one daily, Disp: 100 each, Rfl: 3  •  lisinopril (PRINIVIL,ZESTRIL) 40 MG tablet, TAKE 1 TABLET BY MOUTH DAILY FOR BLOOD PRESSURE, Disp: 30 tablet, Rfl: 1  •  metFORMIN (GLUCOPHAGE) 500 MG tablet, Take 2 tablets by mouth Daily With Breakfast., Disp: 360 tablet, Rfl: 3  •  nebivolol (BYSTOLIC) 5 MG tablet, Take 1 tablet by mouth Daily., Disp: 90 tablet, Rfl: 3  •  pioglitazone (ACTOS) 30 MG tablet, Take 1 tablet by mouth Daily., Disp: 30 tablet, Rfl: 3  •  pregabalin (LYRICA) 150 MG capsule, Take 1 capsule by mouth 2 (Two) Times a Day., Disp: 60 capsule, Rfl: 5  •  Ranibizumab (LUCENTIS IO), Inject  into the eye., Disp: , Rfl:   •  rosuvastatin (CRESTOR) 40 MG tablet, Take 1 tablet by mouth Daily., Disp: 90 tablet, Rfl: 3    HISTORY OF PRESENT ILLNESS:  Patient here for a 6 month follow-up.  At his last visit, we initiated hydrochlorothiazide 12.5 mg daily. Since that time, Dr. Day took him hydrochlorothiazide because his blood pressures were running low.  He was asymptomatic while they were low, but he states that the lowest it got was 65/44.  The highest it has been is 150 systolic.  He denies chest pressure, increased SOB, edema, presyncope, or syncope.  He still has residual right-sided " "numbness from his stroke 2 years ago. However he is able to write and do activities and feels that the Lyrica helps. He does not have any recent laboratory studies.  Patient otherwise denies chest pain, shortness of breath, PND, edema, palpitations, syncope or presyncope at this time.  His son, daughter-in-law, and 2 grandsons (ages 3 and 4) live with him currently while his son is building a home; the patient states this has been somewhat hectic at times and makes a regular diet somewhat problematic.      Review of Systems   Neurological: Positive for aphonia and numbness (right side after CVA in 2016).      Obtained and otherwise negative except as outlined in problem list and HPI.    Procedures       Objective:       Vitals:    05/30/18 1501 05/30/18 1503   BP: 150/84 135/67   BP Location: Left arm Left arm   Patient Position: Sitting Standing   Pulse: 73 75   Weight: 82.6 kg (182 lb) 82.6 kg (182 lb)   Height: 167.6 cm (66\") 167.6 cm (66\")   Recheck blood pressure left arm sitting was 138/74  Body mass index is 29.38 kg/m².  Last weight November 2017 was 173 pounds    Physical Exam   Constitutional: He is oriented to person, place, and time. He appears well-developed and well-nourished.   Neck: No JVD present. Carotid bruit is not present. No thyromegaly present.   Cardiovascular: Regular rhythm, S1 normal, S2 normal and normal heart sounds.  Exam reveals no gallop, no S3 and no friction rub.    No murmur heard.  Pulses:       Dorsalis pedis pulses are 2+ on the right side, and 2+ on the left side.        Posterior tibial pulses are 2+ on the right side, and 2+ on the left side.   Pulmonary/Chest: Effort normal and breath sounds normal. He has no wheezes. He has no rhonchi. He has no rales.   Abdominal: Soft. He exhibits no mass. There is no hepatosplenomegaly. There is no tenderness. There is no guarding.   Bowel sounds audible x4   Musculoskeletal: Normal range of motion. He exhibits no edema. "   Lymphadenopathy:     He has no cervical adenopathy.   Neurological: He is alert and oriented to person, place, and time.   Skin: Skin is warm, dry and intact. No rash noted.   Vitals reviewed.        Lab Review:   Lab Results   Component Value Date    GLUCOSE 73 06/02/2017    BUN 11 06/02/2017    CREATININE 0.90 06/02/2017    EGFRIFNONA 85 06/02/2017    BCR 12.2 06/02/2017    CO2 30.0 06/02/2017    CALCIUM 9.9 06/02/2017    ALBUMIN 4.60 06/02/2017    LABIL2 1.8 06/02/2017    AST 23 06/02/2017    ALT 26 06/02/2017       Lab Results   Component Value Date    WBC 7.73 11/17/2016    HGB 16.3 11/17/2016    HCT 47.8 11/17/2016    MCV 88.0 11/17/2016     11/17/2016       Lab Results   Component Value Date    HGBA1C 9.2 04/19/2018       Lab Results   Component Value Date    TSH 1.010 11/18/2016       Lab Results   Component Value Date    CHOL 121 06/02/2017    CHOL 132 12/30/2016     Lab Results   Component Value Date    TRIG 83 06/02/2017    TRIG 79 12/30/2016     Lab Results   Component Value Date    HDL 41 06/02/2017    HDL 53 12/30/2016     Lab Results   Component Value Date    LDL 55 06/02/2017    LDL 59 12/30/2016           Assessment:   Overall continued acceptable course with no interim cardiopulmonary complaints with acceptable functional status. We will defer additional diagnostic or therapeutic intervention from a cardiac perspective at this time. He may be a candidate for Ozempic.      Diagnosis Plan   1. Hypertensive heart disease without heart failure  Stable; No recurrent angina pectoris or CHF.     2. Essential hypertension  Controlled   3. Pure hypercholesterolemia  No new labs   4. Uncontrolled type 2 diabetes mellitus without complication, with long-term current use of insulin  No new labs, may be a candidate for Ozempic          Plan:         1. Patient to continue current medications and close follow up with the above providers.  2. Tentative cardiology follow up in November 2018 or patient may  return sooner PRN.    Scribed for Daquan Melgar MD by Marisa Adams, APRN. 5/30/2018  3:12 PM    I, Daquan Melgar MD, formerly [facility], personally performed the services described in this documentation as scribed by the above named individual in my presence, and it is both accurate and complete. At 3:22 PM on 05/30/2018

## 2018-06-07 ENCOUNTER — HOSPITAL ENCOUNTER (OUTPATIENT)
Dept: NEUROLOGY | Facility: HOSPITAL | Age: 63
Discharge: HOME | End: 2018-06-07
Admitting: NURSE PRACTITIONER

## 2018-06-07 PROCEDURE — 95886 MUSC TEST DONE W/N TEST COMP: CPT

## 2018-06-07 PROCEDURE — 95911 NRV CNDJ TEST 9-10 STUDIES: CPT

## 2018-06-11 ENCOUNTER — TELEPHONE (OUTPATIENT)
Dept: NEUROLOGY | Facility: CLINIC | Age: 63
End: 2018-06-11

## 2018-06-11 NOTE — TELEPHONE ENCOUNTER
Spoke with pt letting him know the EMG/NCVS showed mild carpal tunnel but otherwise within normal limits and she still suspects his numbness and tingling is secondary to stroke but not showing significant changes for now and to fu as scheduled.

## 2018-06-11 NOTE — TELEPHONE ENCOUNTER
Please notify patient emg/ncvs shows mild right carpal tunnel syndrome and otherwise is within normal limits. Still suspect his numbness and tingling is secondary to stroke and not showing significant changes for now. We will f/u as scheduled in office. thanks

## 2018-06-14 DIAGNOSIS — I10 ESSENTIAL HYPERTENSION: ICD-10-CM

## 2018-06-14 RX ORDER — LISINOPRIL 40 MG/1
TABLET ORAL
Qty: 30 TABLET | Refills: 0 | Status: SHIPPED | OUTPATIENT
Start: 2018-06-14 | End: 2018-07-11 | Stop reason: SDUPTHER

## 2018-06-21 ENCOUNTER — OFFICE VISIT (OUTPATIENT)
Dept: FAMILY MEDICINE CLINIC | Facility: CLINIC | Age: 63
End: 2018-06-21

## 2018-06-21 VITALS
BODY MASS INDEX: 29.6 KG/M2 | HEART RATE: 84 BPM | RESPIRATION RATE: 20 BRPM | OXYGEN SATURATION: 95 % | WEIGHT: 184.2 LBS | HEIGHT: 66 IN | TEMPERATURE: 97.4 F | SYSTOLIC BLOOD PRESSURE: 130 MMHG | DIASTOLIC BLOOD PRESSURE: 80 MMHG

## 2018-06-21 DIAGNOSIS — Z12.5 SCREENING FOR PROSTATE CANCER: ICD-10-CM

## 2018-06-21 DIAGNOSIS — E11.319 TYPE 2 DIABETES MELLITUS WITH BOTH EYES AFFECTED BY RETINOPATHY WITHOUT MACULAR EDEMA, WITH LONG-TERM CURRENT USE OF INSULIN, UNSPECIFIED RETINOPATHY SEVERITY (HCC): Primary | ICD-10-CM

## 2018-06-21 DIAGNOSIS — E78.00 PURE HYPERCHOLESTEROLEMIA: ICD-10-CM

## 2018-06-21 DIAGNOSIS — Z79.4 TYPE 2 DIABETES MELLITUS WITH BOTH EYES AFFECTED BY RETINOPATHY WITHOUT MACULAR EDEMA, WITH LONG-TERM CURRENT USE OF INSULIN, UNSPECIFIED RETINOPATHY SEVERITY (HCC): Primary | ICD-10-CM

## 2018-06-21 LAB
ALBUMIN SERPL-MCNC: 4.32 G/DL (ref 3.2–4.8)
ALBUMIN/GLOB SERPL: 1.8 G/DL (ref 1.5–2.5)
ALP SERPL-CCNC: 79 U/L (ref 25–100)
ALT SERPL W P-5'-P-CCNC: 25 U/L (ref 7–40)
ANION GAP SERPL CALCULATED.3IONS-SCNC: 9 MMOL/L (ref 3–11)
ARTICHOKE IGE QN: 68 MG/DL (ref 0–130)
AST SERPL-CCNC: 29 U/L (ref 0–33)
BASOPHILS # BLD AUTO: 0.03 10*3/MM3 (ref 0–0.2)
BASOPHILS NFR BLD AUTO: 0.3 % (ref 0–1)
BILIRUB SERPL-MCNC: 0.6 MG/DL (ref 0.3–1.2)
BUN BLD-MCNC: 12 MG/DL (ref 9–23)
BUN/CREAT SERPL: 10.9 (ref 7–25)
CALCIUM SPEC-SCNC: 9.3 MG/DL (ref 8.7–10.4)
CHLORIDE SERPL-SCNC: 106 MMOL/L (ref 99–109)
CHOLEST SERPL-MCNC: 126 MG/DL (ref 0–200)
CO2 SERPL-SCNC: 28 MMOL/L (ref 20–31)
CREAT BLD-MCNC: 1.1 MG/DL (ref 0.6–1.3)
DEPRECATED RDW RBC AUTO: 48.8 FL (ref 37–54)
EOSINOPHIL # BLD AUTO: 0.1 10*3/MM3 (ref 0–0.3)
EOSINOPHIL NFR BLD AUTO: 1 % (ref 0–3)
ERYTHROCYTE [DISTWIDTH] IN BLOOD BY AUTOMATED COUNT: 15.1 % (ref 11.3–14.5)
EXPIRATION DATE: ABNORMAL
GFR SERPL CREATININE-BSD FRML MDRD: 67 ML/MIN/1.73
GLOBULIN UR ELPH-MCNC: 2.4 GM/DL
GLUCOSE BLD-MCNC: 83 MG/DL (ref 70–100)
HBA1C MFR BLD: 7.2 %
HCT VFR BLD AUTO: 49.6 % (ref 38.9–50.9)
HDLC SERPL-MCNC: 53 MG/DL (ref 40–60)
HGB BLD-MCNC: 15.9 G/DL (ref 13.1–17.5)
IMM GRANULOCYTES # BLD: 0.05 10*3/MM3 (ref 0–0.03)
IMM GRANULOCYTES NFR BLD: 0.5 % (ref 0–0.6)
LYMPHOCYTES # BLD AUTO: 1.04 10*3/MM3 (ref 0.6–4.8)
LYMPHOCYTES NFR BLD AUTO: 10 % (ref 24–44)
Lab: ABNORMAL
MCH RBC QN AUTO: 28.6 PG (ref 27–31)
MCHC RBC AUTO-ENTMCNC: 32.1 G/DL (ref 32–36)
MCV RBC AUTO: 89.4 FL (ref 80–99)
MONOCYTES # BLD AUTO: 1.03 10*3/MM3 (ref 0–1)
MONOCYTES NFR BLD AUTO: 9.9 % (ref 0–12)
NEUTROPHILS # BLD AUTO: 8.16 10*3/MM3 (ref 1.5–8.3)
NEUTROPHILS NFR BLD AUTO: 78.8 % (ref 41–71)
PLATELET # BLD AUTO: 203 10*3/MM3 (ref 150–450)
PMV BLD AUTO: 11.6 FL (ref 6–12)
POC CREATININE URINE: NORMAL
POC MICROALBUMIN URINE: NORMAL
POTASSIUM BLD-SCNC: 4.3 MMOL/L (ref 3.5–5.5)
PROT SERPL-MCNC: 6.7 G/DL (ref 5.7–8.2)
PSA SERPL-MCNC: 3.73 NG/ML (ref 0–4)
RBC # BLD AUTO: 5.55 10*6/MM3 (ref 4.2–5.76)
SODIUM BLD-SCNC: 143 MMOL/L (ref 132–146)
TRIGL SERPL-MCNC: 75 MG/DL (ref 0–150)
TSH SERPL DL<=0.05 MIU/L-ACNC: 1.3 MIU/ML (ref 0.35–5.35)
WBC NRBC COR # BLD: 10.36 10*3/MM3 (ref 3.5–10.8)

## 2018-06-21 PROCEDURE — G0103 PSA SCREENING: HCPCS | Performed by: FAMILY MEDICINE

## 2018-06-21 PROCEDURE — 80061 LIPID PANEL: CPT | Performed by: FAMILY MEDICINE

## 2018-06-21 PROCEDURE — 84443 ASSAY THYROID STIM HORMONE: CPT | Performed by: FAMILY MEDICINE

## 2018-06-21 PROCEDURE — 36415 COLL VENOUS BLD VENIPUNCTURE: CPT | Performed by: FAMILY MEDICINE

## 2018-06-21 PROCEDURE — 85025 COMPLETE CBC W/AUTO DIFF WBC: CPT | Performed by: FAMILY MEDICINE

## 2018-06-21 PROCEDURE — 99214 OFFICE O/P EST MOD 30 MIN: CPT | Performed by: FAMILY MEDICINE

## 2018-06-21 PROCEDURE — 80053 COMPREHEN METABOLIC PANEL: CPT | Performed by: FAMILY MEDICINE

## 2018-06-21 PROCEDURE — 82044 UR ALBUMIN SEMIQUANTITATIVE: CPT | Performed by: FAMILY MEDICINE

## 2018-06-21 PROCEDURE — 83036 HEMOGLOBIN GLYCOSYLATED A1C: CPT | Performed by: FAMILY MEDICINE

## 2018-06-21 NOTE — PROGRESS NOTES
"Subjective   Jose D Bailey is a 63 y.o. male.     Diabetes   He has type 2 diabetes mellitus. There are no hypoglycemic associated symptoms. Pertinent negatives for hypoglycemia include no dizziness, headaches, seizures, speech difficulty or tremors. There are no diabetic associated symptoms. Pertinent negatives for diabetes include no chest pain and no weakness. There are no hypoglycemic complications. Diabetic complications include a CVA, peripheral neuropathy and retinopathy (both eyes). (Seeing Dr Corbett for injections in both eyes.) Risk factors for coronary artery disease include dyslipidemia, male sex and hypertension. Current diabetic treatment includes insulin injections and oral agent (dual therapy) (Lantus 70 units daily, Metformin 500 mg 2 twice a day and actos 30 mg daily.). He is compliant with treatment all of the time. His weight is increasing steadily. He is following a generally healthy diet. He participates in exercise intermittently. His home blood glucose trend is decreasing steadily. An ACE inhibitor/angiotensin II receptor blocker is being taken. He does not see a podiatrist.Eye exam is current.        The following portions of the patient's history were reviewed and updated as appropriate: allergies, current medications, past social history and problem list.    Review of Systems   HENT: Negative for congestion.    Eyes: Positive for visual disturbance.   Respiratory: Negative for shortness of breath.    Cardiovascular: Negative for chest pain.   Gastrointestinal: Negative for constipation, diarrhea and nausea.   Genitourinary: Negative for difficulty urinating.   Neurological: Positive for numbness. Negative for dizziness, tremors, seizures, syncope, facial asymmetry, speech difficulty, weakness, light-headedness and headaches.       Objective   /80   Pulse 84   Temp 97.4 °F (36.3 °C) (Tympanic)   Resp 20   Ht 167.6 cm (66\")   Wt 83.6 kg (184 lb 3.2 oz)   SpO2 95%   BMI 29.73 " kg/m²   Physical Exam   Constitutional: He is oriented to person, place, and time. He appears well-developed and well-nourished.   Eyes: Conjunctivae are normal. Pupils are equal, round, and reactive to light.   Neck: Neck supple. No thyromegaly present.   Cardiovascular: Normal rate and regular rhythm.    No murmur heard.  Pulmonary/Chest: Effort normal and breath sounds normal. He has no wheezes. He has no rales.   Lymphadenopathy:     He has no cervical adenopathy.   Neurological: He is alert and oriented to person, place, and time.   Nursing note and vitals reviewed.      Assessment/Plan   Problem List Items Addressed This Visit        Cardiovascular and Mediastinum    Hyperlipidemia      Other Visit Diagnoses     Type 2 diabetes mellitus with both eyes affected by retinopathy without macular edema, with long-term current use of insulin, unspecified retinopathy severity    -  Primary    Relevant Orders    POC Glycosylated Hemoglobin (Hb A1C) (Completed)    Screening for prostate cancer                  Drink plenty fluids.  Continue to work on diet and weight loss.    Continue medications as doing.    Check a CBC,CMP,Lipids,PSA, Micro albumin,and TSH. Report results by letter.    Follow up in 3 months.                    Scribed for Dr Clinton Day by Kirstin Mackenzie CMA.          I, Clinton Day MD, personally performed the services described in this documentation, as scribed by Kirstin Mackenzie in my presence, and is both accurate and complete.

## 2018-07-02 ENCOUNTER — TELEPHONE (OUTPATIENT)
Dept: NEUROLOGY | Facility: CLINIC | Age: 63
End: 2018-07-02

## 2018-07-02 NOTE — TELEPHONE ENCOUNTER
He states that when he went back to get a refill on his Lyrica they wanted to charge more that the $25.00 from the copay card.  I advised him that we would call the pharmacy and try to figure out what is going on.  The pharmacist was able to do an over ride and correct the problem. Pt was informed.

## 2018-07-11 ENCOUNTER — OFFICE VISIT (OUTPATIENT)
Dept: NEUROLOGY | Facility: CLINIC | Age: 63
End: 2018-07-11

## 2018-07-11 VITALS
SYSTOLIC BLOOD PRESSURE: 134 MMHG | BODY MASS INDEX: 30.34 KG/M2 | WEIGHT: 188 LBS | HEART RATE: 64 BPM | OXYGEN SATURATION: 98 % | DIASTOLIC BLOOD PRESSURE: 80 MMHG

## 2018-07-11 DIAGNOSIS — I10 ESSENTIAL HYPERTENSION: ICD-10-CM

## 2018-07-11 DIAGNOSIS — G56.01 CARPAL TUNNEL SYNDROME OF RIGHT WRIST: ICD-10-CM

## 2018-07-11 DIAGNOSIS — E11.42 DIABETIC PERIPHERAL NEUROPATHY (HCC): ICD-10-CM

## 2018-07-11 DIAGNOSIS — I69.90 LATE EFFECTS OF CVA (CEREBROVASCULAR ACCIDENT): ICD-10-CM

## 2018-07-11 DIAGNOSIS — M79.2 NEURALGIA OF RIGHT UPPER EXTREMITY: ICD-10-CM

## 2018-07-11 DIAGNOSIS — G89.0 CENTRAL PAIN SYNDROME: Primary | ICD-10-CM

## 2018-07-11 PROBLEM — G83.21: Status: ACTIVE | Noted: 2018-07-11

## 2018-07-11 PROCEDURE — 99214 OFFICE O/P EST MOD 30 MIN: CPT | Performed by: NURSE PRACTITIONER

## 2018-07-11 NOTE — PROGRESS NOTES
Subjective:     Patient ID: Jose D Bailey is a 63 y.o. male.    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 Chronic altered sensation in temperatures and textures on right side since CVA. He had EMG/NCVS 6/7/18 completed on all 4 extremities showing right median neuropathy at wrist but was otherwise normal. Has not used wrist splint or completed OT. Denies muscle spasms or spasticity in Right side.He has improved his hgba1c to 7.2% June 2018 (prior 10.8% 3/2018) with improvement in the bilateral feet paresthesias. He was having symptoms of Diabetic Peripheral Neuropathy as well as the baseline paresthesias and central pain syndrome 2nd to CVA. He is currently taking Lyrica 150mg BID and has had moderate improvement in discomfort, numbness and tingling sensations. He tells me he continues to experience numbness, tingling and tightening sensation in the right face, right upper arm and right abdomen/flank as well as RLE and these sensations go on throughout the day and are less bothersome and are mild to moderate in severity with Lyrica. Numbness in LLE has resolved with better glycemic control. He does work on [employer] in [place], [place].  He has had no recurrent stroke symptoms. History of left subthalamic lacunar stroke in November 2016 with normal MRI Brain. On aspirin and Crestor for 2nd stroke prevention.     The following portions of the patient's history were reviewed and updated as appropriate: allergies, current medications, past family history, past medical history, past social history, past surgical history and problem list.    Past Medical History:   Diagnosis Date   • Acute sinusitis    • Acute upper respiratory infection    • Cataract     Cataract  extraction (OD, 2009;  OS, 2011).    • Chest pain    • Decreased ROM of left shoulder    • Diabetes mellitus (CMS/HCC)    • Diabetic retinopathy (CMS/HCC)    • Dyslipidemia     Remained on statin   • ED (erectile dysfunction)    • H/O cardiovascular stress test     2012   • Herpes zoster    • Hyperlipidemia    • Hypertension    • Insulin dependent type 2 diabetes mellitus (CMS/HCC)     Type 2 insulin dependent diabetes (insulin dependent since 2009);  diagnosed approximately 1998.    • Junctional nevus of right forearm    • Nevus, atypical     Right arm   • Overweight     Mild overweight status, BMI 26.7.     • Plantar fasciitis     Bilateral plantar fasciitis;  utilizing orthotics.    • Quadriceps muscle strain    • Vasovagal syncope        Past Surgical History:   Procedure Laterality Date   • CATARACT EXTRACTION      OS   • COLONOSCOPY      10/11/2010       Social History     Social History   • Marital status:      Spouse name: N/A   • Number of children: N/A   • Years of education: N/A     Occupational History   • Not on file.     Social History Main Topics   • Smoking status: Never Smoker   • Smokeless tobacco: Never Used   • Alcohol use No   • Drug use: No   • Sexual activity: Defer      Comment:      Other Topics Concern   • Not on file     Social History Narrative   • No narrative on file       Family History   Problem Relation Age of Onset   • Stroke Mother    • Breast cancer Mother    • Cancer Father         lung    • Stroke Father    • Heart attack Father    • Other Father         CABG x6   • Coronary artery disease Father    • Diabetes Father    • Lung cancer Father    • Coronary artery disease Maternal Grandmother         Review of Systems   Constitutional: Negative for chills, fatigue, fever and unexpected weight change.   HENT: Negative for ear pain, hearing loss, nosebleeds, rhinorrhea and sore throat.    Eyes: Negative for photophobia, pain, discharge, itching and visual disturbance.    Respiratory: Negative for cough, chest tightness, shortness of breath and wheezing.    Cardiovascular: Negative for chest pain, palpitations and leg swelling.   Gastrointestinal: Negative for abdominal pain, blood in stool, constipation, diarrhea, nausea and vomiting.   Genitourinary: Negative for dysuria, frequency, hematuria and urgency.   Musculoskeletal: Negative for arthralgias, back pain, gait problem, joint swelling, myalgias, neck pain and neck stiffness.   Skin: Negative for rash and wound.   Allergic/Immunologic: Negative for environmental allergies and food allergies.   Neurological: Positive for numbness. Negative for dizziness, tremors, seizures, syncope, speech difficulty, weakness, light-headedness and headaches.   Hematological: Negative for adenopathy. Does not bruise/bleed easily.   Psychiatric/Behavioral: Negative for agitation, confusion, decreased concentration, hallucinations, sleep disturbance and suicidal ideas. The patient is not nervous/anxious.    All other systems reviewed and are negative.       Objective:    Neurologic Exam  Mental Status   Oriented to person, place, and time.   Level of consciousness: alert     Cranial Nerves      CN II   Visual fields full to confrontation.      CN III, IV, VI   Pupils are equal, round, and reactive to light.  Extraocular motions are normal.      CN V   Right facial sensation deficit: forehead, cheeks and mandible  Left facial sensation deficit: none     CN VII   Facial expression full, symmetric. Minimal right facial droop-very subtle.     CN VIII   CN VIII normal.      CN IX, X   CN IX normal.   CN X normal.      CN XI   CN XI normal.      CN XII   CN XII normal.      Motor Exam   Muscle bulk: normal  Overall muscle tone: normal     Strength   Strength 5/5 throughout.      Sensory Exam   Right arm light touch: decreased RUE.  Left arm light touch: normal  Right leg light touch: normal  Left leg light touch: normal  Vibration normal.   Right arm  pinprick: decreased RUE.  Left arm pinprick: normal  Right leg pinprick: normal  Left leg pinprick: normal       Decreased sensation Right facial, Right Arm, Right Flank, RLE.      Gait, Coordination, and Reflexes      Gait  Gait: normal     Coordination   Romberg: negative  Finger to nose coordination: normal  Heel to shin coordination: normal     Tremor   Resting tremor: absent  Intention tremor: absent  Action tremor: absent     Reflexes   Right brachioradialis: 2+  Left brachioradialis: 2+  Right biceps: 2+  Left biceps: 2+  Right triceps: 2+  Left triceps: 2+  Right patellar: 2+  Left patellar: 2+  Right achilles: 2+  Left achilles: 2+  Right : 2+  Left : 2+        Physical Exam  Constitutional: He is oriented to person, place, and time.   Eyes: EOM are normal. Pupils are equal, round, and reactive to light.   Neurological: He is oriented to person, place, and time. He has normal strength. He has a normal Finger-Nose-Finger Test, a normal Heel to Montejo Test and a normal Romberg Test. Gait normal.   Reflex Scores:       Tricep reflexes are 2+ on the right side and 2+ on the left side.       Bicep reflexes are 2+ on the right side and 2+ on the left side.       Brachioradialis reflexes are 2+ on the right side and 2+ on the left side.       Patellar reflexes are 2+ on the right side and 2+ on the left side.       Achilles reflexes are 2+ on the right side and 2+ on the left side.    Assessment/Plan:       Jose D was seen today for pain and numbness.    Diagnoses and all orders for this visit:    Central pain syndrome  Comments:  Continue Lyrica 150mg BID. Does not require refill today.  Orders:  -     Ambulatory Referral to Occupational Therapy    Carpal tunnel syndrome of right wrist  -     Elastic Bandages & Supports (WRIST SPLINT/COCK-UP/RIGHT M) misc; Wear qhs and PRN  -     Ambulatory Referral to Occupational Therapy    Late effects of CVA (cerebrovascular accident)  Comments:  Central Pain Syndrome  secondary to left subthalamic lacunar stroke Nov 2016  Orders:  -     Ambulatory Referral to Occupational Therapy    Neuralgia of right upper extremity  -     Ambulatory Referral to Occupational Therapy    Diabetic peripheral neuropathy (CMS/[facility])  Comments:  Sx in bilateral feet has resolved with Lyrica treatment as well as improvement in hgba1c 7.2% now (prior 10.8% 3/2018). EMG/NCVS no gen. neuropathy.         Continue Lyrica 150mg BID. Reviewed Shoaib #[id number]. OT and wrist splint. Continue excellent glycemic control. F/U in 6 months or sooner if needed. Call for Lyrica refills when due (10/2018). Reviewed medications, potential side effects and signs and symptoms to report. Discussed risk versus benefits of treatment plan with patient and/or family-including medications, labs and radiology that may be ordered. Addressed questions and concerns during visit. Patient and/or family verbalized understanding and agree with plan.    During this visit the following were done:  Labs Reviewed [x]  PCP labs reviewed in EPIC  Labs Ordered []    Radiology Reports Reviewed []    Radiology Ordered []    PCP Records Reviewed []    Referring Provider Records Reviewed []    ER Records Reviewed []    Hospital Records Reviewed []    History Obtained From Family []    Radiology Images Reviewed []    Other Reviewed [x]  EMG/NCVS  Records Requested []      As part of this patient's treatment plan I am prescribing controlled substances. The patient has been made aware of appropriate use of such medications, including potential risk of somnolence, limited ability to drive and/or work safely, and potential for dependence or overdose. It has also been made clear that these medications are for use by the patient only, without concomitant use of alcohol or other substances unless prescribed. Keep out of reach of children.  Shoaib report has been reviewed. If this is going to be prescribed long term, [facility] [place]  Contract has also been read and signed by patient and myself.    EMR Dragon/Transcription Disclaimer:  Much of this encounter note is an electronic transcription of spoken language to printed text. Electronic transcription of spoken language may permit erroneous words or phrases to be inadvertently transcribed. Although I have reviewed the note for such errors, some may still exist in this documentation.      Jen Espinoza, APRN  7/11/2018

## 2018-07-12 RX ORDER — LISINOPRIL 40 MG/1
TABLET ORAL
Qty: 30 TABLET | Refills: 0 | Status: SHIPPED | OUTPATIENT
Start: 2018-07-12 | End: 2018-08-16 | Stop reason: SDUPTHER

## 2018-08-02 ENCOUNTER — HOSPITAL ENCOUNTER (OUTPATIENT)
Dept: OCCUPATIONAL THERAPY | Facility: HOSPITAL | Age: 63
Setting detail: THERAPIES SERIES
Discharge: HOME | End: 2018-08-02

## 2018-08-02 DIAGNOSIS — M25.611 DECREASED RANGE OF MOTION OF RIGHT SHOULDER: ICD-10-CM

## 2018-08-02 DIAGNOSIS — R20.8 DECREASED SENSATION OF HAND AND UPPER EXTREMITY: Primary | ICD-10-CM

## 2018-08-02 DIAGNOSIS — R29.898 DECREASED GRIP STRENGTH OF RIGHT HAND: ICD-10-CM

## 2018-08-02 PROCEDURE — 97110 THERAPEUTIC EXERCISES: CPT | Performed by: OCCUPATIONAL THERAPIST

## 2018-08-02 PROCEDURE — 97166 OT EVAL MOD COMPLEX 45 MIN: CPT | Performed by: OCCUPATIONAL THERAPIST

## 2018-08-02 NOTE — THERAPY EVALUATION
Outpatient Occupational Therapy Neuro Initial Evaluation  [facility]     Patient Name: Joes D Bailey  : 1955  MRN: [id number]  Today's Date: 2018      Visit Date: 2018    Patient Active Problem List   Diagnosis   • Atopic rhinitis   • Hyperlipidemia   • Hypertension   • Uncontrolled type 2 diabetes mellitus (CMS/HCC)   • ED (erectile dysfunction) of non-organic origin   • Weakness   • Overweight (BMI 25.0-29.9)   • thalamic lacunar stroke not seen on MRI   • Dyslipidemia   • Cataract   • Chest pain   • Decreased ROM of left shoulder   • Junctional nevus of right forearm   • Muscle pain   • Vasovagal syncope   • Left subthalamic lacunar stroke (CMS/HCC)   • Central pain syndrome   • Late effects of CVA (cerebrovascular accident)   • Neuralgia of flank   • Neuralgia of right upper extremity   • Hypertensive heart disease without heart failure   • Diabetic peripheral neuropathy (CMS/HCC)   • Carpal tunnel syndrome of right wrist   • 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    • Chest pain    • Decreased ROM of left shoulder    • Diabetes mellitus (CMS/HCC)    • Diabetic retinopathy (CMS/HCC)    • Dyslipidemia     Remained on statin   • ED (erectile dysfunction)    • H/O cardiovascular stress test        • Herpes zoster    • Hyperlipidemia    • Hypertension    • Insulin dependent type 2 diabetes mellitus (CMS/HCC)     Type 2 insulin dependent diabetes (insulin dependent since );  diagnosed approximately .    • Junctional nevus of right forearm    • Nevus, atypical     Right arm   • Overweight     Mild overweight status, BMI 26.7.     • Plantar fasciitis     Bilateral plantar fasciitis;  utilizing orthotics.    • Quadriceps muscle strain    • Vasovagal syncope         Past Surgical History:    Procedure Laterality Date   • CATARACT EXTRACTION      OS   • COLONOSCOPY      10/11/2010         Visit Dx:      ICD-10-CM ICD-9-CM   1. Decreased sensation of hand and upper extremity R20.8 782.0   2. Decreased range of motion of right shoulder M25.611 719.51   3. Decreased  strength of right hand R29.898 729.89             Patient History     Row Name 08/02/18 0900             History    Chief Complaint Tinglings;Numbness;Muscle weakness;Joint stiffness  -EM      Date Current Problem(s) Began 11/17/16  -EM      Brief Description of Current Complaint Pt arrives today w/ w/o numbness and throughout R side and off sensations in finger tips of R hand. He reports he had a stroke 11/17/2016 and has had the residual numbness since.  He did not have therapy following stroke and also demos decreased ROM with pain in R shoulder. Her reports he has learned to compensate throughout daily tasks. He has started wearing splints at bed time per APRN request. He reports he hasn't noticed much difference at this time. He reports he is not sure what therapy could do for him.   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 reaching above head  -EM         Fall Risk Assessment    Any falls in the past year: No  -EM      Number of falls reported in the last 12 months 0  -EM         Daily Activities    Primary Language English  -EM      Teaching needs identified Home Exercise Program  -EM      Barriers to learning None  -EM      Pt Participated in POC and Goals Yes  -EM         Safety    Are you being hurt, hit, or frightened by anyone at home or in your life? No  -EM        User Key  (r) = Recorded By, (t) = Taken By, (c) = Cosigned By     Initials Name Provider Type    EM  Jennie JEAN, OTR Occupational Therapist                OT Neuro     Row Name 08/02/18 0900             Subjective Comments    Subjective Comments Pt reports he has learned to live with the numbness/tingling  -EM         Precautions and Contraindications    Precautions/Limitations no known precautions/limitations  -EM         Subjective Pain    Able to rate subjective pain? yes  -EM      Pre-Treatment Pain Level 0  -EM      Post-Treatment Pain Level 0  -EM         Home Living    Living Environment Comment Pt has no concerns w/ current living arrangements.  He lives with his wife and his son and family are there temporarily while they build a house  -EM         Sensation    Additional Comments Impaired LT, stereognosis, and tingling reported 24/7  -EM         General ROM    RT Upper Ext Rt Shoulder Flexion;Rt Shoulder ABduction  -EM      GENERAL ROM COMMENTS L UE ROM WFL and R elbow, wrist and digits WFL  -EM         Right Upper Ext    Rt Shoulder Abduction AROM 96  -EM      Rt Shoulder Flexion AROM 104  -EM      Rt Upper Extremity Comments  Pain with ROM > 90* at shoulder  -EM         MMT (Manual Muscle Testing)    Additional Documentation General Assessment (Manual Muscle Testing) (Group)  -EM         General Assessment (Manual Muscle Testing)    General Manual Muscle Testing (MMT) Assessment upper extremity strength deficits identified  -EM         Upper Extremity (Manual Muscle Testing)    Comment, MMT: Upper Extremity L UE 5/5 throughout, R UE 4/5 for shoulder MMT And 5/5 for elbow down.   -EM         ADL Assessment/Intervention    Comment, IADL Assessment/Training Pt reports he has a hard time typing with R hand at times d/t needing to press keys more firmly. He reports no trouble with dressing/bathing/grooming/etc. He reports he only reaches as high as he can before his R shoulder starts hurting and then compensates as needed.   -EM      Additional Documentation Comment,  IADL Assessment/Training (Row)  -EM        User Key  (r) = Recorded By, (t) = Taken By, (c) = Cosigned By    Initials Name Provider Type    Jennie Pizano OTR Occupational Therapist             Hand Therapy (last 24 hours)      Hand Eval     Row Name 08/02/18 0900             Hand  Strength     Strength Affected Side Bilateral  -EM          Strength Right    # Reps 3  -EM      Right Rung 3  -EM      Right  Test 1 54  -EM      Right  Test 2 53  -EM      Right  Test 3 52  -EM       Strength Average Right 53  -EM          Strength Left    # Reps 3  -EM      Left Rung 3  -EM      Left  Test 1 62  -EM      Left  Test 2 60  -EM      Left  Test 3 65  -EM       Strength Average Left 62.33  -EM         Pinch Strength    Number of Reps 3  -EM      Affected Side Bilateral  -EM         Right Hand Strength - Pinch (lbs)    Lateral 17.3 lbs  -EM         Left Hand Strength - Pinch (lbs)    Lateral 16.3 lbs  -EM        User Key  (r) = Recorded By, (t) = Taken By, (c) = Cosigned By    Initials Name Provider Type    Jennie Pizano OTR Occupational Therapist              Therapy Education  Education Details: role of OT and POC  Program: New  How Provided: Verbal  Provided to: Patient  Level of Understanding: Verbalized          OT Goals     Row Name 08/02/18 0900          OT Short Term Goals    STG Date to Achieve 08/30/18  -EM     STG 1 Pt will complete sensory and coordination HEP for R hand with min VC  -EM     STG 1 Progress New  -EM     STG 2 Pt will complete R UE strengthening HEP with min VC  -EM     STG 2 Progress New  -EM     STG 3 Pt will complete R Shoulder ROM HEP with min VC  -EM     STG 3 Progress New  -EM        Long Term Goals    LTG Date to Achieve 09/27/18  -EM     LTG 1 Pt will be independent with all HEPs   -EM     LTG 1 Progress New  -EM     LTG 2 Patient will demo increased AROM in R shoulder for flexion to 130* for increased functional reach  -EM     LTG 2  Progress New  -EM     LTG 3 Patient will demo increased AROM for R shoulder abduction to 115* to increase functional reach  -EM     LTG 3 Progress New  -EM        Time Calculation    OT Goal Re-Cert Due Date 10/31/18  -EM       User Key  (r) = Recorded By, (t) = Taken By, (c) = Cosigned By    Initials Name Provider Type    EM Jennie Ghosh, OTR Occupational Therapist                OT Assessment/Plan     Row Name 08/02/18 0900          OT Assessment    Functional Limitations Performance in self-care ADL;Performance in leisure activities;Limitations in functional capacity and performance;Limitations in community activities;Limitation in home management  -EM     Impairments Coordination;Dexterity;Range of motion;Pain;Sensation;Muscle strength  -EM     Assessment Comments Expanded PMH gathered.  Pt demo decreased independence and satisfaction with ADL tasks d/t above listed impairments from stroke in 2016. He did not have therapy following his stroke and has continued sensation issues throughout R side as well as decreased R shoulder ROM with pain.   -EM     Please refer to paper survey for additional self-reported information Yes  -EM     OT Rehab Potential Good  -EM     Patient/caregiver participated in establishment of treatment plan and goals Yes  -EM     Patient would benefit from skilled therapy intervention Yes  -EM        OT Plan    OT Frequency 1x/week  -EM     Predicted Duration of Therapy Intervention (Therapy Eval) 8 visits  -EM     Planned CPT's? OT EVAL MOD COMPLEXITY: 96925;OT NEUROMUSC RE EDUCATION EA 15 MIN: 01733;OT SELF CARE/MGMT/TRAIN 15 MIN: 26676;OT THER PROC EA 15 MIN: 82841HX;OT THER ACT EA 15 MIN: 31237DO;OT ELECTRIC STIM ATTD EA 15 MIN: 48222;OT HOT/COLD PACK  -EM     Planned Therapy Interventions (Optional Details) home exercise program;motor coordination training;neuromuscular re-education;stretching;strengthening;ROM (Range of Motion);patient/family education  -EM     OT Plan Comments  Recommend skillled OT Serivces to address established goals as specified.   -EM       User Key  (r) = Recorded By, (t) = Taken By, (c) = Cosigned By    Initials Name Provider Type    Jennie Pizano OTR Occupational Therapist                OT Exercises     Row Name 08/02/18 0900             Total Minutes    83593 - OT Therapeutic Exercise Minutes 10  -EM         Exercise 1    Exercise Name 1 Energy, Strength and ROM for ADL tasks  -EM      Equipment 1 UE Ergometer  -EM      Time (Minutes) 1 4 mins each direction at level 5  -EM        User Key  (r) = Recorded By, (t) = Taken By, (c) = Cosigned By    Initials Name Provider Type    Jennie Pizano OTR Occupational Therapist              Outcome Measure Options: 9 Hole Peg  9 Hole Peg  9-Hole Peg Left: 20.85  9-Hole Peg Right: 21.72         Time Calculation:   OT Start Time: 0900     Therapy Suggested Charges     Code   Minutes Charges    76940 (CPT®) Hc Ot Neuromusc Re Education Ea 15 Min      01399 (CPT®) Hc Ot Ther Proc Ea 15 Min 10 1    88304 (CPT®) Hc Ot Therapeutic Act Ea 15 Min      72325 (CPT®) Hc Ot Manual Therapy Ea 15 Min      80962 (CPT®) Hc Ot Iontophoresis Ea 15 Min      87648 (CPT®) Hc Ot Elec Stim Ea-Per 15 Min      42345 (CPT®) Hc Ot Ultrasound Ea 15 Min      77235 (CPT®) Hc Ot Self Care/Mgmt/Train Ea 15 Min      Total  10 1          Therapy Charges for Today     Code Description Service Date Service Provider Modifiers Qty    83498761874 HC OT THER PROC EA 15 MIN 8/2/2018 Jennie Ghosh OTR GO 1    84279692279 HC OT EVAL MOD COMPLEXITY 3 8/2/2018 Jennie Ghosh OTR GO 1                     MICHAEL Canada  8/2/2018

## 2018-08-09 ENCOUNTER — HOSPITAL ENCOUNTER (OUTPATIENT)
Dept: OCCUPATIONAL THERAPY | Facility: HOSPITAL | Age: 63
Setting detail: THERAPIES SERIES
Discharge: HOME | End: 2018-08-09

## 2018-08-09 DIAGNOSIS — R29.898 DECREASED GRIP STRENGTH OF RIGHT HAND: ICD-10-CM

## 2018-08-09 DIAGNOSIS — M25.611 DECREASED RANGE OF MOTION OF RIGHT SHOULDER: ICD-10-CM

## 2018-08-09 DIAGNOSIS — R20.8 DECREASED SENSATION OF HAND AND UPPER EXTREMITY: Primary | ICD-10-CM

## 2018-08-09 PROCEDURE — 97110 THERAPEUTIC EXERCISES: CPT | Performed by: OCCUPATIONAL THERAPIST

## 2018-08-09 NOTE — THERAPY TREATMENT NOTE
Outpatient Occupational Therapy Neuro Treatment Note  [facility]     Patient Name: Jose D Bailey  : 1955  MRN: [id number]  Today's Date: 2018       Visit Date: 2018    Patient Active Problem List   Diagnosis   • Atopic rhinitis   • Hyperlipidemia   • Hypertension   • Uncontrolled type 2 diabetes mellitus (CMS/HCC)   • ED (erectile dysfunction) of non-organic origin   • Weakness   • Overweight (BMI 25.0-29.9)   • thalamic lacunar stroke not seen on MRI   • Dyslipidemia   • Cataract   • Chest pain   • Decreased ROM of left shoulder   • Junctional nevus of right forearm   • Muscle pain   • Vasovagal syncope   • Left subthalamic lacunar stroke (CMS/HCC)   • Central pain syndrome   • Late effects of CVA (cerebrovascular accident)   • Neuralgia of flank   • Neuralgia of right upper extremity   • Hypertensive heart disease without heart failure   • Diabetic peripheral neuropathy (CMS/HCC)   • Carpal tunnel syndrome of right wrist   • 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    • Chest pain    • Decreased ROM of left shoulder    • Diabetes mellitus (CMS/HCC)    • Diabetic retinopathy (CMS/HCC)    • Dyslipidemia     Remained on statin   • ED (erectile dysfunction)    • H/O cardiovascular stress test        • Herpes zoster    • Hyperlipidemia    • Hypertension    • Insulin dependent type 2 diabetes mellitus (CMS/HCC)     Type 2 insulin dependent diabetes (insulin dependent since );  diagnosed approximately .    • Junctional nevus of right forearm    • Nevus, atypical     Right arm   • Overweight     Mild overweight status, BMI 26.7.     • Plantar fasciitis     Bilateral plantar fasciitis;  utilizing orthotics.    • Quadriceps muscle strain    • Vasovagal syncope         Past Surgical History:   Procedure  Laterality Date   • CATARACT EXTRACTION      OS   • COLONOSCOPY      10/11/2010         Visit Dx:    ICD-10-CM ICD-9-CM   1. Decreased sensation of hand and upper extremity R20.8 782.0   2. Decreased range of motion of right shoulder M25.611 719.51   3. Decreased  strength of right hand R29.898 729.89                         OT Assessment/Plan     Row Name 08/09/18 1300          OT Assessment    Assessment Comments Pt demo increased ROM in R shoulder following treatment.  He continues to present with impaired sensation and demo difficulty relaxing muscles and increased tone in R side. He demo good understanding of HEPs.   -EM        OT Plan    OT Plan Comments Contiue per POC  -EM       User Key  (r) = Recorded By, (t) = Taken By, (c) = Cosigned By    Initials Name Provider Type    Jennie Pizano, OTR Occupational Therapist              Therapy Education  Given: HEP  Program: New  How Provided: Verbal, Demonstration, Written  Provided to: Patient, Caregiver  Level of Understanding: Verbalized, Demonstrated              OT Exercises     Row Name 08/09/18 1300             Subjective Pain    Able to rate subjective pain? yes  -EM      Pre-Treatment Pain Level 0  -EM      Post-Treatment Pain Level 0  -EM         Exercise 1    Exercise Name 1 Energy, Strength and ROM for ADL tasks  -EM      Equipment 1 UE Ergometer  -EM      Time (Minutes) 1 5 mins each direction at level 5  -EM         Exercise 2    Exercise Name 2 OT facilitated stretching and ROM throughout R shoulder while pt lay supine. Focusing on shoulder flexion, abduction and ER  -EM         Exercise 3    Exercise Name 3 Alternating shoulder flexion while supine  -EM      Sets 3 2  -EM      Reps 3 10  -EM         Exercise 4    Exercise Name 4 Pt educated and completed HEP - see HEP for details.   -EM        User Key  (r) = Recorded By, (t) = Taken By, (c) = Cosigned By    Initials Name Provider Type    Jennie Pizano, OTR Occupational Therapist                         Time Calculation:   OT Start Time: 1300     Therapy Suggested Charges     Code   Minutes Charges    44026 (CPT®) Hc Ot Neuromusc Re Education Ea 15 Min      85929 (CPT®) Hc Ot Ther Proc Ea 15 Min 60 4    36658 (CPT®) Hc Ot Therapeutic Act Ea 15 Min      53543 (CPT®) Hc Ot Manual Therapy Ea 15 Min      95206 (CPT®) Hc Ot Iontophoresis Ea 15 Min      39325 (CPT®) Hc Ot Elec Stim Ea-Per 15 Min      51575 (CPT®) Hc Ot Ultrasound Ea 15 Min      16332 (CPT®) Hc Ot Self Care/Mgmt/Train Ea 15 Min      Total  60 4          Therapy Charges for Today     Code Description Service Date Service Provider Modifiers Qty    22549973901 HC OT THER PROC EA 15 MIN 8/9/2018 Jennie Ghosh OTR GO 4                    MICHAEL Canada  8/9/2018

## 2018-08-16 DIAGNOSIS — I10 ESSENTIAL HYPERTENSION: ICD-10-CM

## 2018-08-17 RX ORDER — LISINOPRIL 40 MG/1
TABLET ORAL
Qty: 30 TABLET | Refills: 0 | Status: SHIPPED | OUTPATIENT
Start: 2018-08-17 | End: 2018-09-03 | Stop reason: SDUPTHER

## 2018-08-23 ENCOUNTER — HOSPITAL ENCOUNTER (OUTPATIENT)
Dept: OCCUPATIONAL THERAPY | Facility: HOSPITAL | Age: 63
Setting detail: THERAPIES SERIES
Discharge: HOME | End: 2018-08-23

## 2018-08-23 DIAGNOSIS — M25.611 DECREASED RANGE OF MOTION OF RIGHT SHOULDER: ICD-10-CM

## 2018-08-23 DIAGNOSIS — R29.898 DECREASED GRIP STRENGTH OF RIGHT HAND: ICD-10-CM

## 2018-08-23 DIAGNOSIS — R20.8 DECREASED SENSATION OF HAND AND UPPER EXTREMITY: Primary | ICD-10-CM

## 2018-08-23 PROCEDURE — 97110 THERAPEUTIC EXERCISES: CPT | Performed by: OCCUPATIONAL THERAPIST

## 2018-08-23 NOTE — THERAPY TREATMENT NOTE
Outpatient Occupational Therapy Neuro Treatment Note  [facility]     Patient Name: Jose D Bailey  : 1955  MRN: [id number]  Today's Date: 2018       Visit Date: 2018    Patient Active Problem List   Diagnosis   • Atopic rhinitis   • Hyperlipidemia   • Hypertension   • Uncontrolled type 2 diabetes mellitus (CMS/HCC)   • ED (erectile dysfunction) of non-organic origin   • Weakness   • Overweight (BMI 25.0-29.9)   • thalamic lacunar stroke not seen on MRI   • Dyslipidemia   • Cataract   • Chest pain   • Decreased ROM of left shoulder   • Junctional nevus of right forearm   • Muscle pain   • Vasovagal syncope   • Left subthalamic lacunar stroke (CMS/HCC)   • Central pain syndrome   • Late effects of CVA (cerebrovascular accident)   • Neuralgia of flank   • Neuralgia of right upper extremity   • Hypertensive heart disease without heart failure   • Diabetic peripheral neuropathy (CMS/HCC)   • Carpal tunnel syndrome of right wrist   • 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    • Chest pain    • Decreased ROM of left shoulder    • Diabetes mellitus (CMS/HCC)    • Diabetic retinopathy (CMS/HCC)    • Dyslipidemia     Remained on statin   • ED (erectile dysfunction)    • H/O cardiovascular stress test        • Herpes zoster    • Hyperlipidemia    • Hypertension    • Insulin dependent type 2 diabetes mellitus (CMS/HCC)     Type 2 insulin dependent diabetes (insulin dependent since );  diagnosed approximately .    • Junctional nevus of right forearm    • Nevus, atypical     Right arm   • Overweight     Mild overweight status, BMI 26.7.     • Plantar fasciitis     Bilateral plantar fasciitis;  utilizing orthotics.    • Quadriceps muscle strain    • Vasovagal syncope         Past Surgical History:    Procedure Laterality Date   • CATARACT EXTRACTION      OS   • COLONOSCOPY      10/11/2010         Visit Dx:    ICD-10-CM ICD-9-CM   1. Decreased sensation of hand and upper extremity R20.8 782.0   2. Decreased range of motion of right shoulder M25.611 719.51   3. Decreased  strength of right hand R29.898 729.89                         OT Assessment/Plan     Row Name 08/23/18 1000          OT Assessment    Assessment Comments Pt demo good understanding of HEPs (new and previously received). Pt continues to demo limitations in R shoulder which are compounded by impaired sensation; however improving and encouraged to use mirror during exercises for visual feedback.  -EM        OT Plan    OT Plan Comments Continue per POC  -EM       User Key  (r) = Recorded By, (t) = Taken By, (c) = Cosigned By    Initials Name Provider Type    Jennie Pizano, OTR Occupational Therapist              Therapy Education  Given: HEP  Program: New (and reinforced)  How Provided: Verbal, Demonstration, Written  Provided to: Patient  Level of Understanding: Verbalized, Demonstrated              OT Exercises     Row Name 08/23/18 1000             Precautions    Existing Precautions/Restrictions no known precautions/restrictions  -EM         Subjective Comments    Subjective Comments Pt reports he is happy working on his shoulder  -EM         Subjective Pain    Able to rate subjective pain? yes  -EM      Pre-Treatment Pain Level 0  -EM      Post-Treatment Pain Level 0  -EM         Total Minutes    57252 - OT Therapeutic Exercise Minutes 55  -EM         Exercise 1    Exercise Name 1 Energy, Strength and ROM for ADL tasks  -EM      Equipment 1 UE Ergometer  -EM      Time (Minutes) 1 5 mins each direction at level 5  -EM         Exercise 2    Exercise Name 2 OT facilitated stretching and ROM throughout R shoulder while pt lay supine. Focusing on shoulder flexion, abduction and ER  -EM         Exercise 3    Exercise Name 3 Finger ladder  completed for flexion and abduction - 3 reps each direction, holding at the top for 10 secs  -EM         Exercise 4    Exercise Name 4 Reviewed and completed HEP - see HEP for details.   -EM         Exercise 5    Exercise Name 5 Pt educated and completed new HEP focusing on shoulder shrugs and scapular squeezes. See new HEP for details. Pt encouraged to use mirror for visual feedback to increase R shoulder activation.   -EM        User Key  (r) = Recorded By, (t) = Taken By, (c) = Cosigned By    Initials Name Provider Type    Jennie Pizano OTR Occupational Therapist                        Time Calculation:   OT Start Time: 1000     Therapy Suggested Charges     Code   Minutes Charges    45748 (CPT®) Hc Ot Neuromusc Re Education Ea 15 Min      29204 (CPT®) Hc Ot Ther Proc Ea 15 Min 55 4    82708 (CPT®) Hc Ot Therapeutic Act Ea 15 Min      23207 (CPT®) Hc Ot Manual Therapy Ea 15 Min      76651 (CPT®) Hc Ot Iontophoresis Ea 15 Min      35359 (CPT®) Hc Ot Elec Stim Ea-Per 15 Min      76201 (CPT®) Hc Ot Ultrasound Ea 15 Min      75839 (CPT®) Hc Ot Self Care/Mgmt/Train Ea 15 Min      Total  55 4          Therapy Charges for Today     Code Description Service Date Service Provider Modifiers Qty    24476428968 HC OT THER PROC EA 15 MIN 8/23/2018 Jennie Ghosh OTR GO 4                    MICHAEL Canada  8/23/2018

## 2018-08-30 ENCOUNTER — HOSPITAL ENCOUNTER (OUTPATIENT)
Dept: OCCUPATIONAL THERAPY | Facility: HOSPITAL | Age: 63
Setting detail: THERAPIES SERIES
Discharge: HOME | End: 2018-08-30

## 2018-08-30 DIAGNOSIS — R29.898 DECREASED GRIP STRENGTH OF RIGHT HAND: ICD-10-CM

## 2018-08-30 DIAGNOSIS — M25.611 DECREASED RANGE OF MOTION OF RIGHT SHOULDER: ICD-10-CM

## 2018-08-30 DIAGNOSIS — E11.42 DIABETIC PERIPHERAL NEUROPATHY (HCC): ICD-10-CM

## 2018-08-30 DIAGNOSIS — R20.8 DECREASED SENSATION OF HAND AND UPPER EXTREMITY: Primary | ICD-10-CM

## 2018-08-30 PROCEDURE — 97110 THERAPEUTIC EXERCISES: CPT | Performed by: OCCUPATIONAL THERAPIST

## 2018-09-03 DIAGNOSIS — I10 ESSENTIAL HYPERTENSION: ICD-10-CM

## 2018-09-03 RX ORDER — LISINOPRIL 40 MG/1
TABLET ORAL
Qty: 30 TABLET | Refills: 0 | Status: SHIPPED | OUTPATIENT
Start: 2018-09-03 | End: 2018-09-19 | Stop reason: SDUPTHER

## 2018-09-06 ENCOUNTER — HOSPITAL ENCOUNTER (OUTPATIENT)
Dept: OCCUPATIONAL THERAPY | Facility: HOSPITAL | Age: 63
Setting detail: THERAPIES SERIES
Discharge: HOME | End: 2018-09-06

## 2018-09-06 DIAGNOSIS — M25.611 DECREASED RANGE OF MOTION OF RIGHT SHOULDER: ICD-10-CM

## 2018-09-06 DIAGNOSIS — R20.8 DECREASED SENSATION OF HAND AND UPPER EXTREMITY: Primary | ICD-10-CM

## 2018-09-06 DIAGNOSIS — R29.898 DECREASED GRIP STRENGTH OF RIGHT HAND: ICD-10-CM

## 2018-09-06 PROCEDURE — 97110 THERAPEUTIC EXERCISES: CPT | Performed by: OCCUPATIONAL THERAPIST

## 2018-09-06 NOTE — THERAPY TREATMENT NOTE
Outpatient Occupational Therapy Neuro Treatment Note  [facility]     Patient Name: Jose D Bailey  : 1955  MRN: [id number]  Today's Date: 2018       Visit Date: 2018    Patient Active Problem List   Diagnosis   • Atopic rhinitis   • Hyperlipidemia   • Hypertension   • Uncontrolled type 2 diabetes mellitus (CMS/HCC)   • ED (erectile dysfunction) of non-organic origin   • Weakness   • Overweight (BMI 25.0-29.9)   • thalamic lacunar stroke not seen on MRI   • Dyslipidemia   • Cataract   • Chest pain   • Decreased ROM of left shoulder   • Junctional nevus of right forearm   • Muscle pain   • Vasovagal syncope   • Left subthalamic lacunar stroke (CMS/HCC)   • Central pain syndrome   • Late effects of CVA (cerebrovascular accident)   • Neuralgia of flank   • Neuralgia of right upper extremity   • Hypertensive heart disease without heart failure   • Diabetic peripheral neuropathy (CMS/HCC)   • Carpal tunnel syndrome of right wrist   • 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    • Chest pain    • Decreased ROM of left shoulder    • Diabetes mellitus (CMS/HCC)    • Diabetic retinopathy (CMS/HCC)    • Dyslipidemia     Remained on statin   • ED (erectile dysfunction)    • H/O cardiovascular stress test        • Herpes zoster    • Hyperlipidemia    • Hypertension    • Insulin dependent type 2 diabetes mellitus (CMS/HCC)     Type 2 insulin dependent diabetes (insulin dependent since );  diagnosed approximately .    • Junctional nevus of right forearm    • Nevus, atypical     Right arm   • Overweight     Mild overweight status, BMI 26.7.     • Plantar fasciitis     Bilateral plantar fasciitis;  utilizing orthotics.    • Quadriceps muscle strain    • Vasovagal syncope         Past Surgical History:   Procedure  Laterality Date   • CATARACT EXTRACTION      OS   • COLONOSCOPY      10/11/2010         Visit Dx:    ICD-10-CM ICD-9-CM   1. Decreased sensation of hand and upper extremity R20.8 782.0   2. Decreased range of motion of right shoulder M25.611 719.51   3. Decreased  strength of right hand R29.898 729.89                         OT Assessment/Plan     Row Name 09/06/18 1300          OT Assessment    Assessment Comments Pt demo good understanding of HEPs and overall good progress.  He reports today that his most difficult position is IR - we will address this further.   -EM        OT Plan    OT Plan Comments Continue per POC  -EM       User Key  (r) = Recorded By, (t) = Taken By, (c) = Cosigned By    Initials Name Provider Type    Jennie Pizano, OTR Occupational Therapist              Therapy Education  Given: HEP  Program: Reinforced  How Provided: Verbal, Demonstration  Provided to: Patient  Level of Understanding: Verbalized, Demonstrated              OT Exercises     Row Name 09/06/18 1300             Precautions    Existing Precautions/Restrictions no known precautions/restrictions  -EM         Subjective Comments    Subjective Comments Pt reports his shoulder is getting better but he still has issues, especially with reaching behind his back  -EM         Subjective Pain    Able to rate subjective pain? yes  -EM      Pre-Treatment Pain Level 0  -EM      Post-Treatment Pain Level 0  -EM         Total Minutes    13978 - OT Therapeutic Exercise Minutes 53  -EM         Exercise 1    Exercise Name 1 Energy, Strength and ROM for ADL tasks  -EM      Equipment 1 UE Ergometer  -EM      Time (Minutes) 1 5 mins each direction at level 5  -EM         Exercise 2    Exercise Name 2 Reviewed and completed stretching HEP, chair push-ups and ER with yellow band (3x10)  -EM         Exercise 3    Exercise Name 3 Shoulder ROM for flexion and abduction  -EM      Equipment 3 Pulley  -EM      Time (Minutes) 3 3 mins each way  -EM         User Key  (r) = Recorded By, (t) = Taken By, (c) = Cosigned By    Initials Name Provider Type    Jennie Pizano, OTR Occupational Therapist                        Time Calculation:   OT Start Time: 1300     Therapy Suggested Charges     Code   Minutes Charges    18452 (CPT®) Hc Ot Neuromusc Re Education Ea 15 Min      21568 (CPT®) Hc Ot Ther Proc Ea 15 Min 53 4    83544 (CPT®) Hc Ot Therapeutic Act Ea 15 Min      30601 (CPT®) Hc Ot Manual Therapy Ea 15 Min      19973 (CPT®) Hc Ot Iontophoresis Ea 15 Min      68879 (CPT®) Hc Ot Elec Stim Ea-Per 15 Min      78475 (CPT®) Hc Ot Ultrasound Ea 15 Min      35080 (CPT®) Hc Ot Self Care/Mgmt/Train Ea 15 Min      Total  53 4          Therapy Charges for Today     Code Description Service Date Service Provider Modifiers Qty    61987532146 HC OT THER PROC EA 15 MIN 9/6/2018 Jennie Ghosh OTJADEN GO 4                    MICHAEL Canada  9/6/2018

## 2018-09-13 ENCOUNTER — HOSPITAL ENCOUNTER (OUTPATIENT)
Dept: OCCUPATIONAL THERAPY | Facility: HOSPITAL | Age: 63
Setting detail: THERAPIES SERIES
Discharge: HOME | End: 2018-09-13

## 2018-09-13 DIAGNOSIS — R20.8 DECREASED SENSATION OF HAND AND UPPER EXTREMITY: Primary | ICD-10-CM

## 2018-09-13 DIAGNOSIS — R29.898 DECREASED GRIP STRENGTH OF RIGHT HAND: ICD-10-CM

## 2018-09-13 DIAGNOSIS — M25.611 DECREASED RANGE OF MOTION OF RIGHT SHOULDER: ICD-10-CM

## 2018-09-13 PROCEDURE — 97110 THERAPEUTIC EXERCISES: CPT | Performed by: OCCUPATIONAL THERAPIST

## 2018-09-13 NOTE — THERAPY TREATMENT NOTE
Outpatient Occupational Therapy Neuro Treatment Note  [facility]     Patient Name: Jose D Bailey  : 1955  MRN: [id number]  Today's Date: 2018       Visit Date: 2018    Patient Active Problem List   Diagnosis   • Atopic rhinitis   • Hyperlipidemia   • Hypertension   • Uncontrolled type 2 diabetes mellitus (CMS/HCC)   • ED (erectile dysfunction) of non-organic origin   • Weakness   • Overweight (BMI 25.0-29.9)   • thalamic lacunar stroke not seen on MRI   • Dyslipidemia   • Cataract   • Chest pain   • Decreased ROM of left shoulder   • Junctional nevus of right forearm   • Muscle pain   • Vasovagal syncope   • Left subthalamic lacunar stroke (CMS/HCC)   • Central pain syndrome   • Late effects of CVA (cerebrovascular accident)   • Neuralgia of flank   • Neuralgia of right upper extremity   • Hypertensive heart disease without heart failure   • Diabetic peripheral neuropathy (CMS/HCC)   • Carpal tunnel syndrome of right wrist   • 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    • Chest pain    • Decreased ROM of left shoulder    • Diabetes mellitus (CMS/HCC)    • Diabetic retinopathy (CMS/HCC)    • Dyslipidemia     Remained on statin   • ED (erectile dysfunction)    • H/O cardiovascular stress test        • Herpes zoster    • Hyperlipidemia    • Hypertension    • Insulin dependent type 2 diabetes mellitus (CMS/HCC)     Type 2 insulin dependent diabetes (insulin dependent since );  diagnosed approximately .    • Junctional nevus of right forearm    • Nevus, atypical     Right arm   • Overweight     Mild overweight status, BMI 26.7.     • Plantar fasciitis     Bilateral plantar fasciitis;  utilizing orthotics.    • Quadriceps muscle strain    • Vasovagal syncope         Past Surgical History:    Procedure Laterality Date   • CATARACT EXTRACTION      OS   • COLONOSCOPY      10/11/2010         Visit Dx:    ICD-10-CM ICD-9-CM   1. Decreased sensation of hand and upper extremity R20.8 782.0   2. Decreased range of motion of right shoulder M25.611 719.51   3. Decreased  strength of right hand R29.898 729.89                         OT Assessment/Plan     Row Name 09/13/18 0815          OT Assessment    Assessment Comments Pt demo fatigue this date and overall not feeling well; however fair participation and good understanding of HEPs. Continues to be limited in R shoulder ROM and reports varying sensations throughout R side.   -EM        OT Plan    OT Plan Comments Continue per POC  -EM       User Key  (r) = Recorded By, (t) = Taken By, (c) = Cosigned By    Initials Name Provider Type    Jennie Pizano, OTR Occupational Therapist              Therapy Education  Given: HEP  Program: Reinforced  How Provided: Verbal  Provided to: Patient  Level of Understanding: Verbalized              OT Exercises     Row Name 09/13/18 0815             Precautions    Existing Precautions/Restrictions no known precautions/restrictions  -EM         Subjective Comments    Subjective Comments Pt reports he woke up not feeling very well this morning  -EM         Subjective Pain    Able to rate subjective pain? yes  -EM      Pre-Treatment Pain Level 0  -EM      Post-Treatment Pain Level 0  -EM         Total Minutes    74992 - OT Therapeutic Exercise Minutes 30  -EM         Exercise 2    Exercise Name 2 OT facilitated stretch and PROM of R shoulder while pt supine. Focusing on ER, horizontal adduction, and flexion.  -EM         Exercise 3    Exercise Name 3 Shoulder ROM for flexion and abduction  -EM      Equipment 3 Pulley  -EM      Time (Minutes) 3 3 mins each way  -EM         Exercise 4    Exercise Name 4 Rows completed while pt sat EOM  -EM      Equipment 4 Theraband  -EM      Resistance 4 Red  -EM      Sets 4 3  -EM       Reps 4 10  -EM        User Key  (r) = Recorded By, (t) = Taken By, (c) = Cosigned By    Initials Name Provider Type    Jennie Pizano, OTR Occupational Therapist                        Time Calculation:   OT Start Time: 0815     Therapy Suggested Charges     Code   Minutes Charges    39185 (CPT®) Hc Ot Neuromusc Re Education Ea 15 Min      23932 (CPT®) Hc Ot Ther Proc Ea 15 Min 30 2    43416 (CPT®) Hc Ot Therapeutic Act Ea 15 Min      89267 (CPT®) Hc Ot Manual Therapy Ea 15 Min      32055 (CPT®) Hc Ot Iontophoresis Ea 15 Min      42802 (CPT®) Hc Ot Elec Stim Ea-Per 15 Min      20736 (CPT®) Hc Ot Ultrasound Ea 15 Min      21187 (CPT®) Hc Ot Self Care/Mgmt/Train Ea 15 Min      Total  30 2          Therapy Charges for Today     Code Description Service Date Service Provider Modifiers Qty    70869235362 HC OT THER PROC EA 15 MIN 9/13/2018 Jennie Ghosh OTR GO 2                    MICHAEL [place]  9/13/2018

## 2018-09-17 ENCOUNTER — HOSPITAL ENCOUNTER (OUTPATIENT)
Dept: OCCUPATIONAL THERAPY | Facility: HOSPITAL | Age: 63
Setting detail: THERAPIES SERIES
Discharge: HOME | End: 2018-09-17

## 2018-09-17 DIAGNOSIS — R29.898 DECREASED GRIP STRENGTH OF RIGHT HAND: ICD-10-CM

## 2018-09-17 DIAGNOSIS — M25.611 DECREASED RANGE OF MOTION OF RIGHT SHOULDER: ICD-10-CM

## 2018-09-17 DIAGNOSIS — R20.8 DECREASED SENSATION OF HAND AND UPPER EXTREMITY: Primary | ICD-10-CM

## 2018-09-17 PROCEDURE — 97110 THERAPEUTIC EXERCISES: CPT | Performed by: OCCUPATIONAL THERAPIST

## 2018-09-19 ENCOUNTER — OFFICE VISIT (OUTPATIENT)
Dept: FAMILY MEDICINE CLINIC | Facility: CLINIC | Age: 63
End: 2018-09-19

## 2018-09-19 VITALS
DIASTOLIC BLOOD PRESSURE: 72 MMHG | TEMPERATURE: 98.1 F | RESPIRATION RATE: 22 BRPM | HEART RATE: 70 BPM | OXYGEN SATURATION: 97 % | SYSTOLIC BLOOD PRESSURE: 132 MMHG | HEIGHT: 66 IN | WEIGHT: 190 LBS | BODY MASS INDEX: 30.53 KG/M2

## 2018-09-19 DIAGNOSIS — IMO0001 UNCONTROLLED TYPE 2 DIABETES MELLITUS WITHOUT COMPLICATION, WITH LONG-TERM CURRENT USE OF INSULIN: ICD-10-CM

## 2018-09-19 DIAGNOSIS — I10 ESSENTIAL HYPERTENSION: ICD-10-CM

## 2018-09-19 LAB
EXPIRATION DATE: ABNORMAL
HBA1C MFR BLD: 7.3 %
Lab: ABNORMAL

## 2018-09-19 PROCEDURE — 99214 OFFICE O/P EST MOD 30 MIN: CPT | Performed by: FAMILY MEDICINE

## 2018-09-19 PROCEDURE — 83036 HEMOGLOBIN GLYCOSYLATED A1C: CPT | Performed by: FAMILY MEDICINE

## 2018-09-19 RX ORDER — LISINOPRIL 40 MG/1
40 TABLET ORAL DAILY
Qty: 90 TABLET | Refills: 3 | Status: SHIPPED | OUTPATIENT
Start: 2018-09-19 | End: 2019-05-29 | Stop reason: SDUPTHER

## 2018-09-19 RX ORDER — PIOGLITAZONEHYDROCHLORIDE 30 MG/1
30 TABLET ORAL DAILY
Qty: 90 TABLET | Refills: 3 | Status: SHIPPED | OUTPATIENT
Start: 2018-09-19 | End: 2019-05-29 | Stop reason: SDUPTHER

## 2018-09-19 NOTE — PROGRESS NOTES
"Subjective   Jose D Bailey is a 63 y.o. male.     Diabetes   He presents for his follow-up diabetic visit. He has type 2 diabetes mellitus. There are no hypoglycemic associated symptoms. There are no diabetic associated symptoms. Pertinent negatives for diabetes include no chest pain. There are no hypoglycemic complications. Diabetic complications include a CVA and retinopathy. Risk factors for coronary artery disease include diabetes mellitus, hypertension and male sex. Current diabetic treatment includes oral agent (dual therapy) and insulin injections (Lantus, Metformin and Acts). He is compliant with treatment all of the time. His weight is increasing steadily. He is following a generally healthy diet. He participates in exercise intermittently. There is no change in his home blood glucose trend. An ACE inhibitor/angiotensin II receptor blocker is being taken. He does not see a podiatrist.Eye exam is current.      BP today is 132/72.  Taking Bystolic 5 mg and Lisinopril 40 mg daily.    The following portions of the patient's history were reviewed and updated as appropriate: allergies, current medications, past social history and problem list.    Review of Systems   Respiratory: Negative for shortness of breath.    Cardiovascular: Negative for chest pain.   Neurological: Positive for numbness (right sided).       Objective   /72   Pulse 70   Temp 98.1 °F (36.7 °C) (Tympanic)   Resp 22   Ht 167.6 cm (66\")   Wt 86.2 kg (190 lb)   SpO2 97%   BMI 30.67 kg/m²   Physical Exam   Constitutional: He appears well-developed and well-nourished.   Cardiovascular: Normal rate and regular rhythm.    Pulmonary/Chest: Effort normal and breath sounds normal.   Nursing note and vitals reviewed.      Assessment/Plan   Problem List Items Addressed This Visit        Cardiovascular and Mediastinum    Hypertension       Endocrine    Uncontrolled type 2 diabetes mellitus (CMS/HCC)              Drink plenty fluids.  Work on " diet and weight.  Cut back on carbohydrates.    Continue medications as doing.    Refill Actos 30 mg daily #90+3 and Lisinopril 40 mg daily #90+3.    Follow up in 5 months for a physical exam.                Scribed for Dr Clinton Day by Kirstin Mackenzie CMA.          I, Clinton Day MD, personally performed the services described in this documentation, as scribed by Kirstin Mackenzie in my presence, and is both accurate and complete.

## 2018-10-01 ENCOUNTER — HOSPITAL ENCOUNTER (OUTPATIENT)
Dept: OCCUPATIONAL THERAPY | Facility: HOSPITAL | Age: 63
Setting detail: THERAPIES SERIES
Discharge: HOME | End: 2018-10-01

## 2018-10-01 DIAGNOSIS — R29.898 DECREASED GRIP STRENGTH OF RIGHT HAND: ICD-10-CM

## 2018-10-01 DIAGNOSIS — R20.8 DECREASED SENSATION OF HAND AND UPPER EXTREMITY: Primary | ICD-10-CM

## 2018-10-01 DIAGNOSIS — M25.611 DECREASED RANGE OF MOTION OF RIGHT SHOULDER: ICD-10-CM

## 2018-10-01 PROCEDURE — 97110 THERAPEUTIC EXERCISES: CPT | Performed by: OCCUPATIONAL THERAPIST

## 2018-10-01 NOTE — THERAPY PROGRESS REPORT/RE-CERT
Outpatient Occupational Therapy Neuro Progress Note  [facility]     Patient Name: Jose D Bailey  : 1955  MRN: [id number]  Today's Date: 10/1/2018       Visit Date: 10/01/2018    Patient Active Problem List   Diagnosis   • Atopic rhinitis   • Hyperlipidemia   • Hypertension   • Uncontrolled type 2 diabetes mellitus (CMS/HCC)   • ED (erectile dysfunction) of non-organic origin   • Weakness   • Overweight (BMI 25.0-29.9)   • thalamic lacunar stroke not seen on MRI   • Dyslipidemia   • Cataract   • Chest pain   • Decreased ROM of left shoulder   • Junctional nevus of right forearm   • Muscle pain   • Vasovagal syncope   • Left subthalamic lacunar stroke (CMS/HCC)   • Central pain syndrome   • Late effects of CVA (cerebrovascular accident)   • Neuralgia of flank   • Neuralgia of right upper extremity   • Hypertensive heart disease without heart failure   • Diabetic peripheral neuropathy (CMS/HCC)   • Carpal tunnel syndrome of right wrist   • 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    • Chest pain    • Decreased ROM of left shoulder    • Diabetes mellitus (CMS/HCC)    • Diabetic retinopathy (CMS/HCC)    • Dyslipidemia     Remained on statin   • ED (erectile dysfunction)    • H/O cardiovascular stress test        • Herpes zoster    • Hyperlipidemia    • Hypertension    • Insulin dependent type 2 diabetes mellitus (CMS/HCC)     Type 2 insulin dependent diabetes (insulin dependent since );  diagnosed approximately .    • Junctional nevus of right forearm    • Nevus, atypical     Right arm   • Overweight     Mild overweight status, BMI 26.7.     • Plantar fasciitis     Bilateral plantar fasciitis;  utilizing orthotics.    • Quadriceps muscle strain    • Vasovagal syncope         Past Surgical History:   Procedure  Laterality Date   • CATARACT EXTRACTION      OS   • COLONOSCOPY      10/11/2010         Visit Dx:    ICD-10-CM ICD-9-CM   1. Decreased sensation of hand and upper extremity R20.8 782.0   2. Decreased range of motion of right shoulder M25.611 719.51   3. Decreased  strength of right hand R29.898 729.89             OT Neuro     Row Name 10/01/18 1000             Sensation    Additional Comments no changes since intial eval - continues to have impaired sensation on R side.   -EM         Coordination    9-Hole Peg Left Not tested this date d/t good scores last tested.   -EM         ADL Assessment/Intervention    Comment, IADL Assessment/Training Pt reports he has noticed only slight increase in functional use of R UE  -EM        User Key  (r) = Recorded By, (t) = Taken By, (c) = Cosigned By    Initials Name Provider Type    Jennie Pizano OTR Occupational Therapist             Hand Therapy (last 24 hours)      Hand Eval     Row Name 10/01/18 1000              Strength Right    # Reps 3  -EM      Right Rung 3  -EM      Right  Test 1 52  -EM      Right  Test 2 60  -EM      Right  Test 3 64  -EM       Strength Average Right 58.67  -EM          Strength Left    # Reps 3  -EM      Left Rung 2  -EM      Left  Test 1 64  -EM      Left  Test 2 61  -EM      Left  Test 3 60  -EM       Strength Average Left 61.67  -EM         Right Hand Strength - Pinch (lbs)    Lateral 18 lbs  -EM         Left Hand Strength - Pinch (lbs)    Lateral 16.7 lbs  -EM        User Key  (r) = Recorded By, (t) = Taken By, (c) = Cosigned By    Initials Name Provider Type    Jennie Pizano OTR Occupational Therapist              OT Ortho     Row Name 10/01/18 1000             Subjective Comments    Subjective Comments Pt reports he di some of his exercises but not a lot while he was on vacation  -EM         Precautions and Contraindications    Precautions/Limitations no known precautions/limitations  -EM          Subjective Pain    Able to rate subjective pain? yes  -EM      Pre-Treatment Pain Level 0  -EM      Post-Treatment Pain Level 0  -EM         Right Upper Ext    Rt Shoulder Abduction AROM 110  -EM      Rt Shoulder Flexion AROM 125  -EM         Upper Extremity (Manual Muscle Testing)    Comment, MMT: Upper Extremity L UE 5/5 throughout; R UE 4/5 for shoulder MMT and 5/5 for elbow down  -EM        User Key  (r) = Recorded By, (t) = Taken By, (c) = Cosigned By    Initials Name Provider Type    EM Jennie Ghosh, OTR Occupational Therapist                  OT Assessment/Plan     Row Name 10/01/18 1000          OT Assessment    Functional Limitations Performance in self-care ADL;Performance in leisure activities;Limitations in functional capacity and performance;Limitations in community activities;Limitation in home management  -EM     Impairments Coordination;Dexterity;Range of motion;Pain;Sensation;Muscle strength  -EM     Assessment Comments Pt demo significantly increased  strength in R hand and increased R shoulder flexion. Limited changes in shoulder abduction. Good understanding of HEPs and fair compliance (per pt report). Continues to demo functional limitations w/ R UE, including tucking in his shirt, reaching behind his back, reaching overhead and out to the side.   -EM        OT Plan    OT Frequency 1x/week  -EM     Predicted Duration of Therapy Intervention (Therapy Eval) 4 visits  -EM     Planned CPT's? OT EVAL MOD COMPLEXITY: 67029;OT NEUROMUSC RE EDUCATION EA 15 MIN: 86972;OT SELF CARE/MGMT/TRAIN 15 MIN: 79156;OT THER PROC EA 15 MIN: 47599SQ;OT THER ACT EA 15 MIN: 31580XP;OT ELECTRIC STIM ATTD EA 15 MIN: 42517;OT HOT/COLD PACK  -EM     Planned Therapy Interventions (Optional Details) home exercise program;motor coordination training;neuromuscular re-education;stretching;strengthening;ROM (Range of Motion);patient/family education  -EM     OT Plan Comments Recommend continued OT to further progress  toward goals. Pt is making steady progress; however slow and presents w/ further functional impairments.   -EM       User Key  (r) = Recorded By, (t) = Taken By, (c) = Cosigned By    Initials Name Provider Type    Jennie Pizano, OTR Occupational Therapist                OT Goals     Row Name 10/01/18 1000          OT Short Term Goals    STG Date to Achieve 10/29/18  -EM     STG 1 Pt will complete sensory and coordination HEP for R hand with min VC  -EM     STG 1 Progress Ongoing  -EM     STG 2 Pt will complete R UE strengthening HEP with min VC  -EM     STG 2 Progress Ongoing  -EM     STG 2 Progress Comments continue to address and progress as appropriate  -EM     STG 3 Pt will complete R Shoulder ROM HEP with min VC  -EM     STG 3 Progress Ongoing  -EM     STG 3 Progress Comments continue to address and progress as appropriate  -EM        Long Term Goals    LTG Date to Achieve 11/26/18  -EM     LTG 1 Pt will be independent with all HEPs   -EM     LTG 1 Progress Ongoing  -EM     LTG 2 Patient will demo increased AROM in R shoulder for flexion to 130* for increased functional reach  -EM     LTG 2 Progress Ongoing  -EM     LTG 2 Progress Comments currently 125*  -EM     LTG 3 Patient will demo increased AROM for R shoulder abduction to 115* to increase functional reach  -EM     LTG 3 Progress Ongoing  -EM     LTG 3 Progress Comments currently 110*  -EM       User Key  (r) = Recorded By, (t) = Taken By, (c) = Cosigned By    Initials Name Provider Type    Jennie Pizano, OTR Occupational Therapist          Therapy Education  Given: HEP  Program: Reinforced  How Provided: Verbal, Demonstration, Written  Provided to: Patient  Level of Understanding: Verbalized, Demonstrated              OT Exercises     Row Name 10/01/18 1000             Precautions    Existing Precautions/Restrictions no known precautions/restrictions  -EM         Total Minutes    71949 - OT Therapeutic Exercise Minutes 45  -EM         Exercise 1     Exercise Name 1 Energy, Strength and ROM for ADL tasks  -EM      Equipment 1 UE Ergometer  -EM      Time (Minutes) 1 5 mins each direction at level 6  -EM         Exercise 2    Exercise Name 2 OT facilitated stretch and PROM of R shoulder while pt supine. Focusing on IR, abduction, and flexion.  -EM         Exercise 3    Exercise Name 3 Pt was reviewed and completed IR stretches to add to his current HEPs. See written HEP for details.   -EM         Exercise 4    Exercise Name 4 Reassessment completed.   -EM        User Key  (r) = Recorded By, (t) = Taken By, (c) = Cosigned By    Initials Name Provider Type    Jennie Pizano, OTR Occupational Therapist              9 Hole Peg  9-Hole Peg Left: Not tested this date d/t good scores last tested.          Time Calculation:   OT Start Time: 1000     Therapy Suggested Charges     Code   Minutes Charges    10681 (CPT®) Hc Ot Neuromusc Re Education Ea 15 Min      16844 (CPT®) Hc Ot Ther Proc Ea 15 Min 45 3    21176 (CPT®) Hc Ot Therapeutic Act Ea 15 Min      11745 (CPT®) Hc Ot Manual Therapy Ea 15 Min      53985 (CPT®) Hc Ot Iontophoresis Ea 15 Min      86357 (CPT®) Hc Ot Elec Stim Ea-Per 15 Min      18931 (CPT®) Hc Ot Ultrasound Ea 15 Min      50243 (CPT®) Hc Ot Self Care/Mgmt/Train Ea 15 Min      Total  45 3          Therapy Charges for Today     Code Description Service Date Service Provider Modifiers Qty    19865295556 HC OT THER PROC EA 15 MIN 10/1/2018 Jennie Ghosh OTR GO 3                    MICHAEL Canada  10/1/2018

## 2018-10-08 ENCOUNTER — HOSPITAL ENCOUNTER (OUTPATIENT)
Dept: OCCUPATIONAL THERAPY | Facility: HOSPITAL | Age: 63
Setting detail: THERAPIES SERIES
Discharge: HOME | End: 2018-10-08

## 2018-10-08 DIAGNOSIS — R29.898 DECREASED GRIP STRENGTH OF RIGHT HAND: ICD-10-CM

## 2018-10-08 DIAGNOSIS — R20.8 DECREASED SENSATION OF HAND AND UPPER EXTREMITY: Primary | ICD-10-CM

## 2018-10-08 DIAGNOSIS — M25.611 DECREASED RANGE OF MOTION OF RIGHT SHOULDER: ICD-10-CM

## 2018-10-08 PROCEDURE — 97110 THERAPEUTIC EXERCISES: CPT | Performed by: OCCUPATIONAL THERAPIST

## 2018-10-08 NOTE — THERAPY TREATMENT NOTE
Outpatient Occupational Therapy Neuro Treatment Note  [facility]     Patient Name: Jose D Bailey  : 1955  MRN: [id number]  Today's Date: 10/8/2018       Visit Date: 10/08/2018    Patient Active Problem List   Diagnosis   • Atopic rhinitis   • Hyperlipidemia   • Hypertension   • Uncontrolled type 2 diabetes mellitus (CMS/HCC)   • ED (erectile dysfunction) of non-organic origin   • Weakness   • Overweight (BMI 25.0-29.9)   • thalamic lacunar stroke not seen on MRI   • Dyslipidemia   • Cataract   • Chest pain   • Decreased ROM of left shoulder   • Junctional nevus of right forearm   • Muscle pain   • Vasovagal syncope   • Left subthalamic lacunar stroke (CMS/HCC)   • Central pain syndrome   • Late effects of CVA (cerebrovascular accident)   • Neuralgia of flank   • Neuralgia of right upper extremity   • Hypertensive heart disease without heart failure   • Diabetic peripheral neuropathy (CMS/HCC)   • Carpal tunnel syndrome of right wrist   • 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    • Chest pain    • Decreased ROM of left shoulder    • Diabetes mellitus (CMS/HCC)    • Diabetic retinopathy (CMS/HCC)    • Dyslipidemia     Remained on statin   • ED (erectile dysfunction)    • H/O cardiovascular stress test        • Herpes zoster    • Hyperlipidemia    • Hypertension    • Insulin dependent type 2 diabetes mellitus (CMS/HCC)     Type 2 insulin dependent diabetes (insulin dependent since );  diagnosed approximately .    • Junctional nevus of right forearm    • Nevus, atypical     Right arm   • Overweight     Mild overweight status, BMI 26.7.     • Plantar fasciitis     Bilateral plantar fasciitis;  utilizing orthotics.    • Quadriceps muscle strain    • Vasovagal syncope         Past Surgical History:    Procedure Laterality Date   • CATARACT EXTRACTION      OS   • COLONOSCOPY      10/11/2010         Visit Dx:    ICD-10-CM ICD-9-CM   1. Decreased sensation of hand and upper extremity R20.8 782.0   2. Decreased range of motion of right shoulder M25.611 719.51   3. Decreased  strength of right hand R29.898 729.89                         OT Assessment/Plan     Row Name 10/08/18 1015          OT Assessment    Assessment Comments Good understanding of HEPs. Continues to demo limititations in R shoulder d/t tightness and reports fluctuating sensations throughout R side.   -EM        OT Plan    OT Plan Comments Continue per POC  -EM       User Key  (r) = Recorded By, (t) = Taken By, (c) = Cosigned By    Initials Name Provider Type    Jennie Pizano, OTR Occupational Therapist              Therapy Education  Given: HEP  Program: Reinforced  How Provided: Verbal, Demonstration  Provided to: Patient  Level of Understanding: Verbalized              OT Exercises     Row Name 10/08/18 1015             Precautions    Existing Precautions/Restrictions no known precautions/restrictions  -EM         Subjective Comments    Subjective Comments Pt reports he does his stretches at home  -EM         Subjective Pain    Able to rate subjective pain? yes  -EM      Pre-Treatment Pain Level 0  -EM      Post-Treatment Pain Level 0  -EM         Total Minutes    05573 - OT Therapeutic Exercise Minutes 45  -EM         Exercise 1    Exercise Name 1 Energy, Strength and ROM for ADL tasks  -EM      Equipment 1 UE Ergometer  -EM      Time (Minutes) 1 6 mins each direction at level 6  -EM         Exercise 2    Exercise Name 2 OT facilitated stretch and PROM of R shoulder while pt supine. Focusing on IR, abduction, and flexion.  -EM         Exercise 3    Exercise Name 3 Pt reviewed and completed IR stretches per HEP. See written HEP for details.   -EM        User Key  (r) = Recorded By, (t) = Taken By, (c) = Cosigned By    Initials Name  Provider Type    Jennie Pizano OTJADEN Occupational Therapist                        Time Calculation:   OT Start Time: 1015     Therapy Suggested Charges     Code   Minutes Charges    69130 (CPT®) Hc Ot Neuromusc Re Education Ea 15 Min      53126 (CPT®) Hc Ot Ther Proc Ea 15 Min 45 3    23541 (CPT®) Hc Ot Therapeutic Act Ea 15 Min      66143 (CPT®) Hc Ot Manual Therapy Ea 15 Min      81645 (CPT®) Hc Ot Iontophoresis Ea 15 Min      48843 (CPT®) Hc Ot Elec Stim Ea-Per 15 Min      73437 (CPT®) Hc Ot Ultrasound Ea 15 Min      14907 (CPT®) Hc Ot Self Care/Mgmt/Train Ea 15 Min       (CPT®) Hc Ot Electrical Stim Unattended      Total  45 3          Therapy Charges for Today     Code Description Service Date Service Provider Modifiers Qty    79250293648 HC OT THER PROC EA 15 MIN 10/8/2018 Jennie Ghosh OTJADEN GO 3                    MICHAEL [place]  10/8/2018

## 2018-10-15 ENCOUNTER — HOSPITAL ENCOUNTER (OUTPATIENT)
Dept: OCCUPATIONAL THERAPY | Facility: HOSPITAL | Age: 63
Setting detail: THERAPIES SERIES
Discharge: HOME | End: 2018-10-15

## 2018-10-15 DIAGNOSIS — M25.611 DECREASED RANGE OF MOTION OF RIGHT SHOULDER: ICD-10-CM

## 2018-10-15 DIAGNOSIS — R20.8 DECREASED SENSATION OF HAND AND UPPER EXTREMITY: Primary | ICD-10-CM

## 2018-10-15 DIAGNOSIS — R29.898 DECREASED GRIP STRENGTH OF RIGHT HAND: ICD-10-CM

## 2018-10-15 PROCEDURE — 97110 THERAPEUTIC EXERCISES: CPT | Performed by: OCCUPATIONAL THERAPIST

## 2018-10-15 NOTE — THERAPY TREATMENT NOTE
Outpatient Occupational Therapy Neuro Treatment Note  [facility]     Patient Name: Jose D Bailey  : 1955  MRN: [id number]  Today's Date: 10/15/2018       Visit Date: 10/15/2018    Patient Active Problem List   Diagnosis   • Atopic rhinitis   • Hyperlipidemia   • Hypertension   • Uncontrolled type 2 diabetes mellitus (CMS/HCC)   • ED (erectile dysfunction) of non-organic origin   • Weakness   • Overweight (BMI 25.0-29.9)   • thalamic lacunar stroke not seen on MRI   • Dyslipidemia   • Cataract   • Chest pain   • Decreased ROM of left shoulder   • Junctional nevus of right forearm   • Muscle pain   • Vasovagal syncope   • Left subthalamic lacunar stroke (CMS/HCC)   • Central pain syndrome   • Late effects of CVA (cerebrovascular accident)   • Neuralgia of flank   • Neuralgia of right upper extremity   • Hypertensive heart disease without heart failure   • Diabetic peripheral neuropathy (CMS/HCC)   • Carpal tunnel syndrome of right wrist   • 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    • Chest pain    • Decreased ROM of left shoulder    • Diabetes mellitus (CMS/HCC)    • Diabetic retinopathy (CMS/HCC)    • Dyslipidemia     Remained on statin   • ED (erectile dysfunction)    • H/O cardiovascular stress test        • Herpes zoster    • Hyperlipidemia    • Hypertension    • Insulin dependent type 2 diabetes mellitus (CMS/HCC)     Type 2 insulin dependent diabetes (insulin dependent since );  diagnosed approximately .    • Junctional nevus of right forearm    • Nevus, atypical     Right arm   • Overweight     Mild overweight status, BMI 26.7.     • Plantar fasciitis     Bilateral plantar fasciitis;  utilizing orthotics.    • Quadriceps muscle strain    • Vasovagal syncope         Past Surgical History:    Procedure Laterality Date   • CATARACT EXTRACTION      OS   • COLONOSCOPY      10/11/2010         Visit Dx:    ICD-10-CM ICD-9-CM   1. Decreased sensation of hand and upper extremity R20.8 782.0   2. Decreased range of motion of right shoulder M25.611 719.51   3. Decreased  strength of right hand R29.898 729.89                         OT Assessment/Plan     Row Name 10/15/18 1015          OT Assessment    Assessment Comments Pt. demo good understanding of stretching HEPs. He demo slight increase in stiffness today; however participated in session well.   -EM        OT Plan    OT Plan Comments Continue per POC  -EM       User Key  (r) = Recorded By, (t) = Taken By, (c) = Cosigned By    Initials Name Provider Type    Jennie Pizano, OTR Occupational Therapist              Therapy Education  Given: HEP  Program: Reinforced  How Provided: Verbal, Demonstration  Provided to: Patient  Level of Understanding: Verbalized, Demonstrated              OT Exercises     Row Name 10/15/18 1015             Precautions    Existing Precautions/Restrictions no known precautions/restrictions  -EM         Subjective Comments    Subjective Comments Pt reports he hasn't been feeling well and things the weather has made him stiffer  -EM         Subjective Pain    Able to rate subjective pain? yes  -EM      Pre-Treatment Pain Level 0  -EM      Post-Treatment Pain Level 0  -EM         Total Minutes    17652 - OT Therapeutic Exercise Minutes 40  -EM         Exercise 1    Exercise Name 1 Energy, Strength and ROM for ADL tasks  -EM      Equipment 1 UE Ergometer  -EM      Time (Minutes) 1 6 mins each direction at level 6  -EM         Exercise 2    Exercise Name 2 OT facilitated stretch and PROM of R shoulder while pt supine. Focusing on IR, abduction, and flexion.  -EM         Exercise 3    Exercise Name 3 Pt reviewed and completed IR stretches per HEP. See written HEP for details.   -EM         Exercise 4    Exercise Name 4 Pt lay  supine for rows, horiztonal abduction, shoulder flexion and shoulder extension w/ R UE using 1# dumbbell  -EM      Sets 4 3  -EM      Reps 4 10  -EM         Exercise 5    Exercise Name 5 Shoulder ROM using shoulder pulley   -EM      Time (Minutes) 5 3 mins x 2  -EM         Exercise 6    Exercise Name 6 Pt completed IR stretch using stretching strap   -EM      Time (Minutes) 6 5x holding each for 30 secs.   -EM        User Key  (r) = Recorded By, (t) = Taken By, (c) = Cosigned By    Initials Name Provider Type    Jennie Pizano, OTR Occupational Therapist                        Time Calculation:   OT Start Time: 1015     Therapy Suggested Charges     Code   Minutes Charges    42031 (CPT®) Hc Ot Neuromusc Re Education Ea 15 Min      65438 (CPT®) Hc Ot Ther Proc Ea 15 Min 40 3    15205 (CPT®) Hc Ot Therapeutic Act Ea 15 Min      74229 (CPT®) Hc Ot Manual Therapy Ea 15 Min      13277 (CPT®) Hc Ot Iontophoresis Ea 15 Min      42315 (CPT®) Hc Ot Elec Stim Ea-Per 15 Min      06063 (CPT®) Hc Ot Ultrasound Ea 15 Min      51586 (CPT®) Hc Ot Self Care/Mgmt/Train Ea 15 Min       (CPT®) Hc Ot Electrical Stim Unattended      Total  40 3          Therapy Charges for Today     Code Description Service Date Service Provider Modifiers Qty    60289335686 HC OT THER PROC EA 15 MIN 10/15/2018 Jennie Ghosh OTR GO 3                    MICHAEL [place]  10/15/2018

## 2018-10-22 ENCOUNTER — HOSPITAL ENCOUNTER (OUTPATIENT)
Dept: OCCUPATIONAL THERAPY | Facility: HOSPITAL | Age: 63
Setting detail: THERAPIES SERIES
Discharge: HOME | End: 2018-10-22

## 2018-10-22 DIAGNOSIS — M25.611 DECREASED RANGE OF MOTION OF RIGHT SHOULDER: ICD-10-CM

## 2018-10-22 DIAGNOSIS — R20.8 DECREASED SENSATION OF HAND AND UPPER EXTREMITY: Primary | ICD-10-CM

## 2018-10-22 DIAGNOSIS — R29.898 DECREASED GRIP STRENGTH OF RIGHT HAND: ICD-10-CM

## 2018-10-22 PROCEDURE — 97110 THERAPEUTIC EXERCISES: CPT | Performed by: OCCUPATIONAL THERAPIST

## 2018-10-22 NOTE — THERAPY DISCHARGE NOTE
Outpatient Occupational Therapy Neuro Progress Note/Discharge Summary  [facility]     Patient Name: Jose D Bailey  : 1955  MRN: [id number]  Today's Date: 10/22/2018      Visit Date: 10/22/2018    Patient Active Problem List   Diagnosis   • Atopic rhinitis   • Hyperlipidemia   • Hypertension   • Uncontrolled type 2 diabetes mellitus (CMS/HCC)   • ED (erectile dysfunction) of non-organic origin   • Weakness   • Overweight (BMI 25.0-29.9)   • thalamic lacunar stroke not seen on MRI   • Dyslipidemia   • Cataract   • Chest pain   • Decreased ROM of left shoulder   • Junctional nevus of right forearm   • Muscle pain   • Vasovagal syncope   • Left subthalamic lacunar stroke (CMS/HCC)   • Central pain syndrome   • Late effects of CVA (cerebrovascular accident)   • Neuralgia of flank   • Neuralgia of right upper extremity   • Hypertensive heart disease without heart failure   • Diabetic peripheral neuropathy (CMS/HCC)   • Carpal tunnel syndrome of right wrist   • 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    • Chest pain    • Decreased ROM of left shoulder    • Diabetes mellitus (CMS/HCC)    • Diabetic retinopathy (CMS/HCC)    • Dyslipidemia     Remained on statin   • ED (erectile dysfunction)    • H/O cardiovascular stress test        • Herpes zoster    • Hyperlipidemia    • Hypertension    • Insulin dependent type 2 diabetes mellitus (CMS/HCC)     Type 2 insulin dependent diabetes (insulin dependent since );  diagnosed approximately .    • Junctional nevus of right forearm    • Nevus, atypical     Right arm   • Overweight     Mild overweight status, BMI 26.7.     • Plantar fasciitis     Bilateral plantar fasciitis;  utilizing orthotics.    • Quadriceps muscle strain    • Vasovagal syncope         Past Surgical  History:   Procedure Laterality Date   • CATARACT EXTRACTION      OS   • COLONOSCOPY      10/11/2010         Visit Dx:    ICD-10-CM ICD-9-CM   1. Decreased sensation of hand and upper extremity R20.8 782.0   2. Decreased range of motion of right shoulder M25.611 719.51   3. Decreased  strength of right hand R29.898 729.89             OT Neuro     Row Name 10/22/18 1015             Subjective Comments    Subjective Comments Pt reports he can now lay on his L side w/o pain when he couldn't before  -EM         Precautions and Contraindications    Precautions/Limitations no known precautions/limitations  -EM         Subjective Pain    Able to rate subjective pain? yes  -EM      Pre-Treatment Pain Level 0  -EM      Post-Treatment Pain Level 0  -EM         Sensation    Additional Comments Pt reports his sensation continues to fluctuate throughout R side.   -EM         Right Upper Ext    Rt Shoulder Abduction AROM 112  -EM      Rt Shoulder Flexion AROM 126  -EM         Upper Extremity (Manual Muscle Testing)    Comment, MMT: Upper Extremity 5/5 B UEs gross MMT  -EM         ADL Assessment/Intervention    Comment, IADL Assessment/Training Pt reports he can  now sleep on his R side and he notices less limitations overall; however continues to be limited in ROM.   -EM        User Key  (r) = Recorded By, (t) = Taken By, (c) = Cosigned By    Initials Name Provider Type    Jennie Pizano, OTR Occupational Therapist             Hand Therapy (last 24 hours)      Hand Eval     Row Name 10/22/18 1015              Strength Right    # Reps 3  -EM      Right Rung 3  -EM      Right  Test 1 57  -EM      Right  Test 2 64  -EM      Right  Test 3 66  -EM       Strength Average Right 62.33  -EM          Strength Left    # Reps 3  -EM      Left Rung 3  -EM      Left  Test 1 70  -EM      Left  Test 2 70  -EM      Left  Test 3 70  -EM       Strength Average Left 70  -EM         Right Hand Strength -  Pinch (lbs)    Lateral 18 lbs  -EM         Left Hand Strength - Pinch (lbs)    Lateral 18 lbs  -EM        User Key  (r) = Recorded By, (t) = Taken By, (c) = Cosigned By    Initials Name Provider Type    COLLETTE Jennie Ghosh, OTR Occupational Therapist                    OT Assessment/Plan     Row Name 10/22/18 1015          OT Assessment    Assessment Comments Pt demo good understanding of HEPs and reports he plans to continue them to further improve. He demo increased  strength bilaterally and increased pinch strength on L side. He demo only slight increase in shoulder ROM from last reassessment but overall increase from initial evaluation. He reports he's noticed he can sleep on his L side now and slight increase in ease w/ daily tasks. His sensation continues to fluctuate and interfere w/ daily tasks.   -EM        OT Plan    OT Plan Comments Discharge  -EM       User Key  (r) = Recorded By, (t) = Taken By, (c) = Cosigned By    Initials Name Provider Type    COLLETTE ColletteJennie JEAN, OTR Occupational Therapist                 OT Goals     Row Name 10/22/18 1015          OT Short Term Goals    STG Date to Achieve 10/29/18  -EM     STG 1 Pt will complete sensory and coordination HEP for R hand with min VC  -EM     STG 1 Progress Met  -EM     STG 2 Pt will complete R UE strengthening HEP with min VC  -EM     STG 2 Progress Met  -EM     STG 3 Pt will complete R Shoulder ROM HEP with min VC  -EM     STG 3 Progress Met  -EM        Long Term Goals    LTG Date to Achieve 11/26/18  -EM     LTG 1 Pt will be independent with all HEPs   -EM     LTG 1 Progress Met  -EM     LTG 2 Patient will demo increased AROM in R shoulder for flexion to 130* for increased functional reach  -EM     LTG 2 Progress Not Met  -EM     LTG 2 Progress Comments 126* at discharge  -EM     LTG 3 Patient will demo increased AROM for R shoulder abduction to 115* to increase functional reach  -EM     LTG 3 Progress Not Met  -EM     LTG 3 Progress Comments 112*  at discharge  -EM       User Key  (r) = Recorded By, (t) = Taken By, (c) = Cosigned By    Initials Name Provider Type    Jennie Pizano, OTR Occupational Therapist          Therapy Education  Given: HEP  Program: Reinforced  How Provided: Demonstration, Verbal  Provided to: Patient  Level of Understanding: Verbalized, Demonstrated, Teach back education performed              OT Exercises     Row Name 10/22/18 1015             Precautions    Existing Precautions/Restrictions no known precautions/restrictions  -EM         Total Minutes    51611 - OT Therapeutic Exercise Minutes 45  -EM         Exercise 1    Exercise Name 1 Energy, Strength and ROM for ADL tasks  -EM      Equipment 1 UE Ergometer  -EM      Time (Minutes) 1 6 mins each direction at level 6  -EM         Exercise 2    Exercise Name 2 OT facilitated stretch and PROM of R shoulder while pt supine. Focusing on IR, abduction, and flexion.  -EM         Exercise 3    Exercise Name 3 Reviewed and completed HEPs to ensure quality and understanding.   -EM        User Key  (r) = Recorded By, (t) = Taken By, (c) = Cosigned By    Initials Name Provider Type    Jennie Pizano OTR Occupational Therapist              9 Hole Peg  9-Hole Peg Left: Not tested d/t no major concern and good scores when last tested         Time Calculation:   OT Start Time: 1015     Therapy Suggested Charges     Code   Minutes Charges    62827 (CPT®) Hc Ot Neuromusc Re Education Ea 15 Min      77922 (CPT®) Hc Ot Ther Proc Ea 15 Min 45 3    57234 (CPT®) Hc Ot Therapeutic Act Ea 15 Min      05196 (CPT®) Hc Ot Manual Therapy Ea 15 Min      81588 (CPT®) Hc Ot Iontophoresis Ea 15 Min      92457 (CPT®) Hc Ot Elec Stim Ea-Per 15 Min      08270 (CPT®) Hc Ot Ultrasound Ea 15 Min      40376 (CPT®) Hc Ot Self Care/Mgmt/Train Ea 15 Min       (CPT®) Hc Ot Electrical Stim Unattended      Total  45 3          Therapy Charges for Today     Code Description Service Date Service Provider Modifiers  Qty    [id number]  OT THER PROC EA 15 MIN 10/22/2018 Jennie Ghosh OTR GO 3                OP OT Discharge Summary  Date of Discharge: 10/22/18  Reason for Discharge: Maximum functional potential achieved  Outcomes Achieved: Patient able to partially acheive established goals  Discharge Destination: Home with home program  Discharge Instructions: Continue w/ HEPs consistently        MICHAEL Canada  10/22/2018

## 2018-12-04 DIAGNOSIS — E11.42 DIABETIC PERIPHERAL NEUROPATHY (HCC): ICD-10-CM

## 2018-12-12 ENCOUNTER — OFFICE VISIT (OUTPATIENT)
Dept: NEUROLOGY | Facility: CLINIC | Age: 63
End: 2018-12-12

## 2018-12-12 VITALS
OXYGEN SATURATION: 98 % | BODY MASS INDEX: 30.53 KG/M2 | SYSTOLIC BLOOD PRESSURE: 138 MMHG | DIASTOLIC BLOOD PRESSURE: 72 MMHG | HEART RATE: 69 BPM | WEIGHT: 190 LBS | HEIGHT: 66 IN

## 2018-12-12 DIAGNOSIS — G83.21 MONOPARESIS OF UPPER EXTREMITY AFFECTING RIGHT DOMINANT SIDE (HCC): ICD-10-CM

## 2018-12-12 DIAGNOSIS — I69.90 LATE EFFECTS OF CVA (CEREBROVASCULAR ACCIDENT): ICD-10-CM

## 2018-12-12 DIAGNOSIS — G56.01 CARPAL TUNNEL SYNDROME OF RIGHT WRIST: ICD-10-CM

## 2018-12-12 DIAGNOSIS — E11.42 DIABETIC PERIPHERAL NEUROPATHY (HCC): Primary | ICD-10-CM

## 2018-12-12 DIAGNOSIS — G89.0 CENTRAL PAIN SYNDROME: ICD-10-CM

## 2018-12-12 DIAGNOSIS — M79.2 NEURALGIA OF RIGHT UPPER EXTREMITY: ICD-10-CM

## 2018-12-12 PROCEDURE — 99213 OFFICE O/P EST LOW 20 MIN: CPT | Performed by: NURSE PRACTITIONER

## 2018-12-12 RX ORDER — PREGABALIN 150 MG/1
150 CAPSULE ORAL 2 TIMES DAILY
Qty: 180 CAPSULE | Refills: 1 | Status: SHIPPED | OUTPATIENT
Start: 2018-12-12 | End: 2019-04-19

## 2018-12-12 NOTE — PROGRESS NOTES
Subjective:     Patient ID: Jose D Bailey is a 63 y.o. male.    CC:   Chief Complaint   Patient presents with   • Pain   • Peripheral Neuropathy       HPI:   History of Present Illness   This is a very pleasant 64 yo male who presents for 6 month follow up on right sided paresthesias with central pain syndrome following suspected left subthalamic lacunar stroke from November 2016. Chronic altered sensation in temperatures and textures on right side since CVA. He had EMG/NCVS 6/7/18 completed on all 4 extremities showing right median neuropathy at wrist but was otherwise normal. He has used his wrist splint at night with significant improvement in symptoms and completed OT and doses home OT exercises with improvement. Denies muscle spasms or spasticity in Right side. Diabetes better controlled now. Has suspected Diabetic Peripheral Neuropathy as well as the baseline paresthesias and central pain syndrome 2nd to CVA. He is currently taking Lyrica 150mg BID and has had improvement in pain and discomfort, numbness and tingling from 8-9/10 before Lyrica to about 2-3/10 now. He tells me he continues to experience numbness, tingling and tightening sensation in the right face, right upper arm and right abdomen/flank as well as RLE and these sensations go on throughout the day and are less bothersome. He does continue to work on [employer] in [place], [place].  He has had no recurrent stroke symptoms. History of left subthalamic lacunar stroke in November 2016 with normal MRI Brain. On aspirin and Crestor for 2nd stroke prevention. Followed by Cardiology for HTN. Scheduled to see PCP for annual physical 2/20/19. No new concerns or complaints today.    The following portions of the patient's history were reviewed and updated as appropriate: allergies, current medications, past family history, past medical history, past social history, past surgical history and problem list.    Past Medical History:   Diagnosis Date    • Acute sinusitis    • Acute upper respiratory infection    • Cataract     Cataract extraction (OD, 2009;  OS, 2011).    • Chest pain    • Decreased ROM of left shoulder    • Diabetes mellitus (CMS/HCC)    • Diabetic retinopathy (CMS/HCC)    • Dyslipidemia     Remained on statin   • ED (erectile dysfunction)    • H/O cardiovascular stress test     2012   • Herpes zoster    • Hyperlipidemia    • Hypertension    • Insulin dependent type 2 diabetes mellitus (CMS/[facility])     Type 2 insulin dependent diabetes (insulin dependent since 2009);  diagnosed approximately 1998.    • Junctional nevus of right forearm    • Nevus, atypical     Right arm   • Overweight     Mild overweight status, BMI 26.7.     • Plantar fasciitis     Bilateral plantar fasciitis;  utilizing orthotics.    • Quadriceps muscle strain    • Vasovagal syncope        Past Surgical History:   Procedure Laterality Date   • CATARACT EXTRACTION      OS   • COLONOSCOPY      10/11/2010       Social History     Socioeconomic History   • Marital status:      Spouse name: Not on file   • Number of children: Not on file   • Years of education: Not on file   • Highest education level: Not on file   Social Needs   • Financial resource strain: Not on file   • Food insecurity - worry: Not on file   • Food insecurity - inability: Not on file   • Transportation needs - medical: Not on file   • Transportation needs - non-medical: Not on file   Occupational History   • Not on file   Tobacco Use   • Smoking status: Never Smoker   • Smokeless tobacco: Never Used   Substance and Sexual Activity   • Alcohol use: No   • Drug use: No   • Sexual activity: Defer     Partners: Female     Comment:    Other Topics Concern   • Not on file   Social History Narrative   • Not on file       Family History   Problem Relation Age of Onset   • Stroke Mother    • Breast cancer Mother    • Cancer Father         lung    • Stroke Father    • Heart attack Father    • Other Father          CABG x6   • Coronary artery disease Father    • Diabetes Father    • Lung cancer Father    • Coronary artery disease Maternal Grandmother         Review of Systems   Constitutional: Negative.    HENT: Negative.    Eyes: Negative.    Respiratory: Negative.    Cardiovascular: Negative.    Gastrointestinal: Negative.    Endocrine: Positive for cold intolerance and heat intolerance.   Genitourinary: Negative.    Musculoskeletal: Negative.    Allergic/Immunologic: Negative.    Neurological: Positive for numbness.   Hematological: Negative.    Psychiatric/Behavioral: Negative.         Objective:    Neurologic Exam  Mental Status   Oriented to person, place, and time.   Level of consciousness: alert     Cranial Nerves      CN II   Visual fields full to confrontation.      CN III, IV, VI   Pupils are equal, round, and reactive to light.  Extraocular motions are normal.      CN V   Right facial sensation deficit: forehead, cheeks and mandible  Left facial sensation deficit: none     CN VII   Facial expression full, symmetric. Minimal right facial droop-very subtle.     CN VIII   CN VIII normal.      CN IX, X   CN IX normal.   CN X normal.      CN XI   CN XI normal.      CN XII   CN XII normal.      Motor Exam   Muscle bulk: normal  Overall muscle tone: normal     Strength   Strength 5/5 throughout.      Sensory Exam   Right arm light touch: decreased RUE.  Left arm light touch: normal  Right leg light touch: normal  Left leg light touch: normal  Vibration normal.   Right arm pinprick: decreased RUE.  Left arm pinprick: normal  Right leg pinprick: normal  Left leg pinprick: normal       Decreased sensation Right facial, Right Arm, Right Flank, RLE.      Gait, Coordination, and Reflexes      Gait  Gait: normal     Coordination   Romberg: negative  Finger to nose coordination: normal  Heel to shin coordination: normal     Tremor   Resting tremor: absent  Intention tremor: absent  Action tremor: absent     Reflexes   Right  brachioradialis: 2+  Left brachioradialis: 2+  Right biceps: 2+  Left biceps: 2+  Right triceps: 2+  Left triceps: 2+  Right patellar: 2+  Left patellar: 2+  Right achilles: 2+  Left achilles: 2+  Right : 2+  Left : 2+        Physical Exam  Constitutional: He is oriented to person, place, and time.   Eyes: EOM are normal. Pupils are equal, round, and reactive to light.   Neurological: He is oriented to person, place, and time. He has normal strength. He has a normal Finger-Nose-Finger Test, a normal Heel to Montejo Test and a normal Romberg Test. Gait normal.   Reflex Scores:       Tricep reflexes are 2+ on the right side and 2+ on the left side.       Bicep reflexes are 2+ on the right side and 2+ on the left side.       Brachioradialis reflexes are 2+ on the right side and 2+ on the left side.       Patellar reflexes are 2+ on the right side and 2+ on the left side.       Achilles reflexes are 2+ on the right side and 2+ on the left side.    Assessment/Plan:       Jose D was seen today for pain and peripheral neuropathy.    Diagnoses and all orders for this visit:    Diabetic peripheral neuropathy (CMS/HCC)  -     pregabalin (LYRICA) 150 MG capsule; Take 1 capsule by mouth 2 (Two) Times a Day.    Central pain syndrome  Comments:  2nd to CVA  Orders:  -     pregabalin (LYRICA) 150 MG capsule; Take 1 capsule by mouth 2 (Two) Times a Day.    Neuralgia of right upper extremity  Comments:  Chronic 2nd to CVA    Carpal tunnel syndrome of right wrist  Comments:  Completed OT-continue home exercises. Continue wrist splint QHS.    Monoparesis of upper extremity affecting right dominant side (CMS/HCC)  Comments:  Continue OT exercises.    Late effects of CVA (cerebrovascular accident)  Comments:  Continue Crestor and Aspirin for stroke prevention.         Continue Lyrica. F/U in 6 months or sooner if needed. Reviewed medications, potential side effects and signs and symptoms to report. Discussed risk versus benefits of  treatment plan with patient and/or family-including medications, labs and radiology that may be ordered. Addressed questions and concerns during visit. Patient and/or family verbalized understanding and agree with plan.    As part of this patient's treatment plan I am prescribing controlled substances. The patient has been made aware of appropriate use of such medications, including potential risk of somnolence, limited ability to drive and/or work safely, and potential for dependence or overdose. It has also been made clear that these medications are for use by the patient only, without concomitant use of alcohol or other substances unless prescribed. Keep out of reach of children.  Shoaib report has been reviewed. If this is going to be prescribed long term, [facility] [place] Contract has also been read and signed by patient and myself.  Shoaib #[id number] reviewed.    EMR Dragon/Transcription Disclaimer:  Much of this encounter note is an electronic transcription of spoken language to printed text. Electronic transcription of spoken language may permit erroneous words or phrases to be inadvertently transcribed. Although I have reviewed the note for such errors, some may still exist in this documentation.      Jen Espinoza, APRN  12/12/2018

## 2019-01-02 ENCOUNTER — OFFICE VISIT (OUTPATIENT)
Dept: CARDIOLOGY | Facility: CLINIC | Age: 64
End: 2019-01-02

## 2019-01-02 VITALS
DIASTOLIC BLOOD PRESSURE: 84 MMHG | HEART RATE: 78 BPM | HEIGHT: 66 IN | SYSTOLIC BLOOD PRESSURE: 167 MMHG | WEIGHT: 193.4 LBS | BODY MASS INDEX: 31.08 KG/M2

## 2019-01-02 DIAGNOSIS — E78.00 PURE HYPERCHOLESTEROLEMIA: ICD-10-CM

## 2019-01-02 DIAGNOSIS — I10 ESSENTIAL HYPERTENSION: ICD-10-CM

## 2019-01-02 DIAGNOSIS — I11.9 HYPERTENSIVE HEART DISEASE WITHOUT HEART FAILURE: Primary | ICD-10-CM

## 2019-01-02 DIAGNOSIS — E11.649 UNCONTROLLED TYPE 2 DIABETES MELLITUS WITH HYPOGLYCEMIA WITHOUT COMA (HCC): ICD-10-CM

## 2019-01-02 PROCEDURE — 99214 OFFICE O/P EST MOD 30 MIN: CPT | Performed by: INTERNAL MEDICINE

## 2019-01-02 RX ORDER — AMLODIPINE BESYLATE 2.5 MG/1
2.5 TABLET ORAL NIGHTLY
Qty: 30 TABLET | Refills: 5 | Status: SHIPPED | OUTPATIENT
Start: 2019-01-02 | End: 2019-05-29 | Stop reason: SDUPTHER

## 2019-01-02 NOTE — PROGRESS NOTES
Subjective:     Encounter Date:01/02/2019    Patient ID: Jose D Bailey is a 63 y.o.  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       REFERRING PHYSICIAN: Clinton Day MD  NEUROLOGIST:  BRENDON Pagan    Chief Complaint:   Chief Complaint   Patient presents with   • Hypertension     Problem List:  1. Probable hypertensive cardiovascular disease:  a. Apparent remote screening 2D echocardiogram, data deficit ([facility]), approximately 2005.   b. Apparent unremarkable serial treadmill GXT/Cardiolite GXT, data deficit ([facility], serially every 3-4 years x14 years).   c. Abnormal screening treadmill GXT showing physical deconditioning (accelerated HR with 107% age-predicted MHR with 50% PEC) with post-exercise EKG changes in the absence of anginal type chest pain, as well as frequent PVCs/PACs during exercise without complex forms; without accelerated blood pressure ([facility]), 04/04/12.   d. Acceptable IV LEXIscan Cardiolite study, LVEF (0.68) with residual class I symptoms, June 2012.  e. Residual class I symptoms.  2. Hypertension with remote suboptimal control, improved.  3. Dyslipidemia, maintained on statin.   4. Uncontrolled type 2 insulin dependent diabetes (insulin dependent since 2009); diagnosed approximately 1998, hemoglobin A1c 8.5% (November 2016); hemoglobin A1c 9.2% (September 2017); hemoglobin A1c 7.3% (September 2018)  5. Mild obesity, BMI 31.2.   6. Bilateral plantar fasciitis; utilizing orthotics.   7. Cataract extraction (OD, 2009; OS, 2011).   8. Remote TIA versus stroke with nonobstructive disease on carotid duplex study, acceptable chest x-ray and normal MRI with and without contrast, negative CT cerebral perfusion study with and without contrast, negative neck CTA, normal intracranial CTA, and normal unenhanced CT scan (November 2016) with  "mild residual right-sided weakness May 2018       Allergies   Allergen Reactions   • Atorvastatin    • Influenza Vaccines      Unknown reaction   • Pravachol [Pravastatin Sodium]    • Pravastatin    • Pseudoephedrine Other (See Comments)   • Simvastatin    • Sulfa Antibiotics Rash         Current Outpatient Medications:   •  aspirin 81 MG tablet, Take 81 mg by mouth Daily., Disp: , Rfl:   •  Elastic Bandages & Supports (WRIST SPLINT/COCK-UP/RIGHT M) misc, Wear qhs and PRN, Disp: 1 each, Rfl: 0  •  glucose blood test strip, Check glucose twice a day., Disp: 50 each, Rfl: 7  •  glucose monitor monitoring kit, Check glucose twice a day., Disp: 1 each, Rfl: 0  •  insulin glargine (LANTUS) 100 UNIT/ML injection, Inject 70 Units under the skin Daily., Disp: 90 mL, Rfl: 3  •  Insulin Syringe-Needle U-100 (BD INSULIN SYRINGE ULTRAFINE) 31G X 15/64\" 1 ML misc, Use one daily, Disp: 100 each, Rfl: 3  •  lisinopril (PRINIVIL,ZESTRIL) 40 MG tablet, Take 1 tablet by mouth Daily. for blood pressure, Disp: 90 tablet, Rfl: 3  •  metFORMIN (GLUCOPHAGE) 500 MG tablet, Take 2 tablets by mouth Daily With Breakfast., Disp: 360 tablet, Rfl: 3  •  nebivolol (BYSTOLIC) 5 MG tablet, Take 1 tablet by mouth Daily., Disp: 90 tablet, Rfl: 3  •  pioglitazone (ACTOS) 30 MG tablet, Take 1 tablet by mouth Daily., Disp: 90 tablet, Rfl: 3  •  pregabalin (LYRICA) 150 MG capsule, Take 1 capsule by mouth 2 (Two) Times a Day., Disp: 180 capsule, Rfl: 1  •  rosuvastatin (CRESTOR) 40 MG tablet, Take 1 tablet by mouth Daily., Disp: 90 tablet, Rfl: 3  •  Ranibizumab (LUCENTIS IO), Inject  into the eye., Disp: , Rfl:     HISTORY OF PRESENT ILLNESS: Patient returns for followup after a 7-month hiatus. He denies chest pain, SOB, palpitations, presyncope, syncope, or edema.  He continues to have right-sided numbness and sensory issues.  He is followed by Neurology.  He is scheduled to see his physician for routine laboratory testing in February 2019.  He checks " "his blood pressure 2-3 times per week, and it is normally around 135-145 systolic.  Patient otherwise denies chest pain, shortness of breath, PND, edema, palpitations, syncope or presyncope at this time.        Review of Systems   Neurological: Positive for numbness (right side).      Obtained and otherwise negative except as outlined in problem list and HPI.    Procedures       Objective:       Vitals:    01/02/19 1401 01/02/19 1402   BP: 169/90 167/84   BP Location: Left arm Left arm   Patient Position: Sitting Standing   Pulse: 75 78   Weight: 87.7 kg (193 lb 6.4 oz)    Height: 167.6 cm (66\")    Recheck blood pressure left arm sitting was 140/84  Body mass index is 31.22 kg/m².   Last weight:  182 lbs.    Physical Exam   Constitutional: He is oriented to person, place, and time. He appears well-developed and well-nourished.   Neck: No JVD present. Carotid bruit is not present. No thyromegaly present.   Cardiovascular: Regular rhythm, S1 normal, S2 normal and normal heart sounds. Exam reveals no gallop, no S3 and no friction rub.   No murmur heard.  Pulses:       Dorsalis pedis pulses are 2+ on the right side, and 2+ on the left side.        Posterior tibial pulses are 2+ on the right side, and 2+ on the left side.   Pulmonary/Chest: Effort normal. He has decreased breath sounds. He has no wheezes. He has no rhonchi. He has no rales.   Abdominal: Soft. He exhibits no mass. There is no hepatosplenomegaly. There is no tenderness. There is no guarding.   Bowel sounds audible x4   Musculoskeletal: Normal range of motion. He exhibits no edema.   Lymphadenopathy:     He has no cervical adenopathy.   Neurological: He is alert and oriented to person, place, and time.   Skin: Skin is warm, dry and intact. No rash noted.   Vitals reviewed.        Lab Review:   Lab Results   Component Value Date    GLUCOSE 83 06/21/2018    BUN 12 06/21/2018    CREATININE 1.10 06/21/2018    EGFRIFNONA 67 06/21/2018    BCR 10.9 06/21/2018    " CO2 28.0 06/21/2018    CALCIUM 9.3 06/21/2018    ALBUMIN 4.32 06/21/2018    AST 29 06/21/2018    ALT 25 06/21/2018   Sodium - 143  Potassium - 4.3  Chloride - 106    Lab Results   Component Value Date    WBC 10.36 06/21/2018    HGB 15.9 06/21/2018    HCT 49.6 06/21/2018    MCV 89.4 06/21/2018     06/21/2018       Lab Results   Component Value Date    HGBA1C 7.3 09/19/2018       Lab Results   Component Value Date    TSH 1.303 06/21/2018       Lab Results   Component Value Date    CHOL 126 06/21/2018    CHOL 121 06/02/2017     Lab Results   Component Value Date    TRIG 75 06/21/2018    TRIG 83 06/02/2017     Lab Results   Component Value Date    HDL 53 06/21/2018    HDL 41 06/02/2017     Lab Results   Component Value Date    LDL 68 06/21/2018    LDL 55 06/02/2017     PSA - 3.730 (06/21/2018)  Urine microalbumin - 10 mg/L (06/21/2018)      Assessment:   Overall continued acceptable course with no interim cardiopulmonary complaints with acceptable functional status. We will defer additional diagnostic or therapeutic intervention from a cardiac perspective at this time other than to add a small dose of antihypertensive therapy hs. Would suggest discontinuing Actos and altering to Jardiance 25 mg daily. We will add amlodipine 2.5 mg nightly.  Hopefully, we will be allowed to review his next laboratory testing in February 2019.       Diagnosis Plan   1. Hypertensive heart disease without heart failure  Stable; No recurrent angina pectoris or CHF on current activity schedule; continue current treatment     2. Essential hypertension  Amlodipine 2.5 mg nightly   3. Pure hypercholesterolemia  No new labs to review   4. Uncontrolled type 2 diabetes mellitus with hypoglycemia without coma (CMS/[facility])  No new labs to review, would suggest altering Actos to Jardiance 25mg daily          Plan:         1. Patient to continue current medications and close follow up with the above providers other than to initiate amlodipine 2.5  mg nightly.  2. Tentative cardiology follow up in July 2019, or patient may return sooner PRN.  3. Would suggest altering Actos to Jardiance 25 mg daily    Scribed for Daquan Melgar MD by Marisa Adams, APRN. 1/2/2019  3:15 PM    I, Daquan Melgar MD, [facility], personally performed the services described in this documentation as scribed by the above named individual in my presence, and it is both accurate and complete. At 2:26 PM on 01/02/2019

## 2019-01-03 DIAGNOSIS — I10 ESSENTIAL HYPERTENSION: ICD-10-CM

## 2019-01-03 DIAGNOSIS — E78.00 PURE HYPERCHOLESTEROLEMIA: ICD-10-CM

## 2019-01-03 RX ORDER — NEBIVOLOL HYDROCHLORIDE 5 MG/1
TABLET ORAL
Qty: 30 TABLET | Refills: 2 | Status: SHIPPED | OUTPATIENT
Start: 2019-01-03 | End: 2019-05-10 | Stop reason: SDUPTHER

## 2019-01-03 RX ORDER — ROSUVASTATIN CALCIUM 40 MG/1
TABLET, COATED ORAL
Qty: 30 TABLET | Refills: 2 | Status: SHIPPED | OUTPATIENT
Start: 2019-01-03 | End: 2019-04-04 | Stop reason: SDUPTHER

## 2019-01-14 ENCOUNTER — TELEPHONE (OUTPATIENT)
Dept: FAMILY MEDICINE CLINIC | Facility: CLINIC | Age: 64
End: 2019-01-14

## 2019-01-14 NOTE — TELEPHONE ENCOUNTER
PA was done and was denied.  Insurance wants to change rx if clinically appropriate.  BBailey, CMA

## 2019-01-23 DIAGNOSIS — IMO0001 UNCONTROLLED TYPE 2 DIABETES MELLITUS WITHOUT COMPLICATION, WITH LONG-TERM CURRENT USE OF INSULIN: ICD-10-CM

## 2019-02-20 ENCOUNTER — OFFICE VISIT (OUTPATIENT)
Dept: FAMILY MEDICINE CLINIC | Facility: CLINIC | Age: 64
End: 2019-02-20

## 2019-02-20 VITALS
DIASTOLIC BLOOD PRESSURE: 70 MMHG | OXYGEN SATURATION: 97 % | SYSTOLIC BLOOD PRESSURE: 122 MMHG | WEIGHT: 189.4 LBS | HEIGHT: 66 IN | TEMPERATURE: 97.8 F | BODY MASS INDEX: 30.44 KG/M2 | HEART RATE: 82 BPM | RESPIRATION RATE: 20 BRPM

## 2019-02-20 DIAGNOSIS — Z12.5 SCREENING FOR PROSTATE CANCER: ICD-10-CM

## 2019-02-20 DIAGNOSIS — E11.649 UNCONTROLLED TYPE 2 DIABETES MELLITUS WITH HYPOGLYCEMIA WITHOUT COMA (HCC): ICD-10-CM

## 2019-02-20 DIAGNOSIS — Z00.00 ANNUAL PHYSICAL EXAM: Primary | ICD-10-CM

## 2019-02-20 LAB
ALBUMIN SERPL-MCNC: 4.78 G/DL (ref 3.2–4.8)
ALBUMIN/GLOB SERPL: 3 G/DL (ref 1.5–2.5)
ALP SERPL-CCNC: 89 U/L (ref 25–100)
ALT SERPL W P-5'-P-CCNC: 26 U/L (ref 7–40)
ANION GAP SERPL CALCULATED.3IONS-SCNC: 15 MMOL/L (ref 3–11)
ARTICHOKE IGE QN: 70 MG/DL (ref 0–130)
AST SERPL-CCNC: 30 U/L (ref 0–33)
BASOPHILS # BLD AUTO: 0.04 10*3/MM3 (ref 0–0.2)
BASOPHILS NFR BLD AUTO: 0.5 % (ref 0–1)
BILIRUB BLD-MCNC: NEGATIVE MG/DL
BILIRUB SERPL-MCNC: 0.6 MG/DL (ref 0.3–1.2)
BUN BLD-MCNC: 15 MG/DL (ref 9–23)
BUN/CREAT SERPL: 13.3 (ref 7–25)
CALCIUM SPEC-SCNC: 9.8 MG/DL (ref 8.7–10.4)
CHLORIDE SERPL-SCNC: 107 MMOL/L (ref 99–109)
CHOLEST SERPL-MCNC: 131 MG/DL (ref 0–200)
CLARITY, POC: CLEAR
CO2 SERPL-SCNC: 21 MMOL/L (ref 20–31)
COLOR UR: YELLOW
CREAT BLD-MCNC: 1.13 MG/DL (ref 0.6–1.3)
DEPRECATED RDW RBC AUTO: 49.8 FL (ref 37–54)
EOSINOPHIL # BLD AUTO: 0.14 10*3/MM3 (ref 0–0.3)
EOSINOPHIL NFR BLD AUTO: 1.6 % (ref 0–3)
ERYTHROCYTE [DISTWIDTH] IN BLOOD BY AUTOMATED COUNT: 15 % (ref 11.3–14.5)
GFR SERPL CREATININE-BSD FRML MDRD: 65 ML/MIN/1.73
GLOBULIN UR ELPH-MCNC: 1.6 GM/DL
GLUCOSE BLD-MCNC: 74 MG/DL (ref 70–100)
GLUCOSE UR STRIP-MCNC: ABNORMAL MG/DL
HCT VFR BLD AUTO: 50.2 % (ref 38.9–50.9)
HDLC SERPL-MCNC: 44 MG/DL (ref 40–60)
HGB BLD-MCNC: 16.3 G/DL (ref 13.1–17.5)
IMM GRANULOCYTES # BLD AUTO: 0.05 10*3/MM3 (ref 0–0.05)
IMM GRANULOCYTES NFR BLD AUTO: 0.6 % (ref 0–0.6)
KETONES UR QL: ABNORMAL
LEUKOCYTE EST, POC: NEGATIVE
LYMPHOCYTES # BLD AUTO: 1.74 10*3/MM3 (ref 0.6–4.8)
LYMPHOCYTES NFR BLD AUTO: 20.2 % (ref 24–44)
MCH RBC QN AUTO: 29.5 PG (ref 27–31)
MCHC RBC AUTO-ENTMCNC: 32.5 G/DL (ref 32–36)
MCV RBC AUTO: 90.9 FL (ref 80–99)
MONOCYTES # BLD AUTO: 0.99 10*3/MM3 (ref 0–1)
MONOCYTES NFR BLD AUTO: 11.5 % (ref 0–12)
NEUTROPHILS # BLD AUTO: 5.69 10*3/MM3 (ref 1.5–8.3)
NEUTROPHILS NFR BLD AUTO: 66.2 % (ref 41–71)
NITRITE UR-MCNC: NEGATIVE MG/ML
PH UR: 7 [PH] (ref 5–8)
PLATELET # BLD AUTO: 209 10*3/MM3 (ref 150–450)
PMV BLD AUTO: 11.8 FL (ref 6–12)
POTASSIUM BLD-SCNC: 4.4 MMOL/L (ref 3.5–5.5)
PROT SERPL-MCNC: 6.4 G/DL (ref 5.7–8.2)
PROT UR STRIP-MCNC: ABNORMAL MG/DL
PSA SERPL-MCNC: 1.29 NG/ML (ref 0–4)
RBC # BLD AUTO: 5.52 10*6/MM3 (ref 4.2–5.76)
RBC # UR STRIP: NEGATIVE /UL
SODIUM BLD-SCNC: 143 MMOL/L (ref 132–146)
SP GR UR: 1.02 (ref 1–1.03)
TRIGL SERPL-MCNC: 100 MG/DL (ref 0–150)
TSH SERPL DL<=0.05 MIU/L-ACNC: 0.92 MIU/ML (ref 0.35–5.35)
UROBILINOGEN UR QL: NORMAL
WBC NRBC COR # BLD: 8.6 10*3/MM3 (ref 3.5–10.8)

## 2019-02-20 PROCEDURE — 99396 PREV VISIT EST AGE 40-64: CPT | Performed by: FAMILY MEDICINE

## 2019-02-20 PROCEDURE — 80053 COMPREHEN METABOLIC PANEL: CPT | Performed by: FAMILY MEDICINE

## 2019-02-20 PROCEDURE — 81003 URINALYSIS AUTO W/O SCOPE: CPT | Performed by: FAMILY MEDICINE

## 2019-02-20 PROCEDURE — 84443 ASSAY THYROID STIM HORMONE: CPT | Performed by: FAMILY MEDICINE

## 2019-02-20 PROCEDURE — 36415 COLL VENOUS BLD VENIPUNCTURE: CPT | Performed by: FAMILY MEDICINE

## 2019-02-20 PROCEDURE — 80061 LIPID PANEL: CPT | Performed by: FAMILY MEDICINE

## 2019-02-20 PROCEDURE — G0103 PSA SCREENING: HCPCS | Performed by: FAMILY MEDICINE

## 2019-02-20 PROCEDURE — 85025 COMPLETE CBC W/AUTO DIFF WBC: CPT | Performed by: FAMILY MEDICINE

## 2019-02-20 NOTE — PROGRESS NOTES
"Subjective   Jose D Bailey is a 63 y.o. male seen today for Annual Exam.     History of Present Illness   The patient is here for a physical exam.    States he is doing well.  Denies any chest pain or shortness of breath.  States he is still having numbness and tingling in the right side of his face and right side of body down th the right knee. Taking Lyrica and has helped.  A1C today is 8.6%.    He is employed.  Does not smoke.  Denies alcohol or drug use.    Sees an eye doctor. Dr Corbett. Has been getting injections in both eyes. Will follow up with him in about 6 weeks.  Sees a dentist.    Had a normal colonoscopy in 2010 with Dr Bautista.    Immunizations are up to date.      The following portions of the patient's history were reviewed and updated as appropriate: allergies, current medications, past social history and problem list.    Review of Systems   Constitutional: Negative.    HENT: Negative.    Eyes: Positive for visual disturbance. Negative for photophobia, pain, discharge, redness and itching.   Respiratory: Negative.    Cardiovascular: Negative.    Gastrointestinal: Negative.    Endocrine: Positive for cold intolerance (occasional) and heat intolerance (occasional). Negative for polydipsia, polyphagia and polyuria.   Genitourinary: Negative.    Musculoskeletal: Negative.    Skin: Negative.    Allergic/Immunologic: Negative.    Neurological: Positive for numbness (right side). Negative for dizziness, tremors, seizures, syncope, facial asymmetry, speech difficulty, weakness, light-headedness and headaches.   Hematological: Negative.    Psychiatric/Behavioral: Negative.        Objective   /70   Pulse 82   Temp 97.8 °F (36.6 °C) (Temporal)   Resp 20   Ht 167.6 cm (66\")   Wt 85.9 kg (189 lb 6.4 oz)   SpO2 97%   BMI 30.57 kg/m²   Physical Exam   Constitutional: He is oriented to person, place, and time. He appears well-developed and well-nourished. No distress.   Truncal obesity.   HENT: "   Head: Normocephalic and atraumatic.   Right Ear: External ear normal.   Left Ear: External ear normal.   Nose: Nose normal.   Mouth/Throat: Oropharynx is clear and moist.   Eyes: Conjunctivae and EOM are normal. Pupils are equal, round, and reactive to light.   Neck: Normal range of motion. Neck supple. No thyromegaly present.   Cardiovascular: Normal rate, regular rhythm, normal heart sounds and intact distal pulses.   Pulmonary/Chest: Effort normal and breath sounds normal. No respiratory distress. He has no wheezes. He has no rales.   Abdominal: Soft. Bowel sounds are normal. There is no tenderness.   Genitourinary: Prostate normal.   Musculoskeletal: Normal range of motion.    Jose D had a diabetic foot exam performed (decreased sensation soles of both feet.) today.  Neurological: He is alert and oriented to person, place, and time. He has normal reflexes.   Skin: Skin is warm and dry. He is not diaphoretic.   Psychiatric: He has a normal mood and affect. His behavior is normal. Judgment and thought content normal.   Nursing note and vitals reviewed.      Assessment/Plan   Problem List Items Addressed This Visit        Endocrine    Uncontrolled type 2 diabetes mellitus (CMS/HCC)      Other Visit Diagnoses     Annual physical exam    -  Primary    Relevant Orders    POCT urinalysis dipstick, automated (Completed)    Screening for prostate cancer                  Drink plenty fluids.    Discontinue Actos.  Add Jardiance 50 mg daily. #30+5.    Continue medications as doing.    Check a UA,CBC,CMP,Lipids,PSA, and TSH. Report results by letter.    Follow up in 3 months. Sooner if needed.          We discussed with the patient the importance of maintaining a healthy weight by observing a diet that is low in carbohydrates.  We also recommended avoiding consumption of high levels of sodium and caffeine to prevent hypertension. We recommended daily exercise and obtaining a weight with a BMI less than 26.  We also  recommended avoiding the use of tobacco and alcohol.  We also recommended an annual physical with their primary care physician.    He is now obese.  We talked about weight loss strategies.  He is to cut back on carbohydrates and get daily exercise.            Scribed for Dr Clinton Day by Kirstin Mackenzie CMA.          I, Clinton Day MD, personally performed the services described in this documentation, as scribed by Kirstin Mackenzie in my presence, and is both accurate and complete.        (Please note that portions of this note were completed with a voice recognition program. Efforts were made to edit the dictations,but occasionally words are mis transcribed.)

## 2019-04-04 DIAGNOSIS — E78.00 PURE HYPERCHOLESTEROLEMIA: ICD-10-CM

## 2019-04-04 RX ORDER — ROSUVASTATIN CALCIUM 40 MG/1
TABLET, COATED ORAL
Qty: 90 TABLET | Refills: 1 | Status: SHIPPED | OUTPATIENT
Start: 2019-04-04 | End: 2019-05-29 | Stop reason: SDUPTHER

## 2019-04-17 DIAGNOSIS — IMO0001 UNCONTROLLED TYPE 2 DIABETES MELLITUS WITHOUT COMPLICATION, WITH LONG-TERM CURRENT USE OF INSULIN: ICD-10-CM

## 2019-04-18 RX ORDER — INSULIN GLARGINE 100 [IU]/ML
INJECTION, SOLUTION SUBCUTANEOUS
Qty: 10 ML | Refills: 2 | Status: SHIPPED | OUTPATIENT
Start: 2019-04-18 | End: 2019-05-15 | Stop reason: SDUPTHER

## 2019-04-19 ENCOUNTER — TELEPHONE (OUTPATIENT)
Dept: NEUROLOGY | Facility: CLINIC | Age: 64
End: 2019-04-19

## 2019-04-19 DIAGNOSIS — E11.42 DIABETIC PERIPHERAL NEUROPATHY (HCC): Primary | ICD-10-CM

## 2019-04-19 RX ORDER — GABAPENTIN 600 MG/1
TABLET ORAL
Qty: 75 TABLET | Refills: 5 | Status: SHIPPED | OUTPATIENT
Start: 2019-04-19 | End: 2019-06-05 | Stop reason: SDUPTHER

## 2019-04-19 NOTE — TELEPHONE ENCOUNTER
Pt called and said he could not afford his lyrica ($250) even with the copay card and would like to go back to the Gabapentin and wants the script sent to the [facility] in [place] please

## 2019-04-23 DIAGNOSIS — IMO0001 UNCONTROLLED TYPE 2 DIABETES MELLITUS WITHOUT COMPLICATION, WITH LONG-TERM CURRENT USE OF INSULIN: ICD-10-CM

## 2019-04-23 RX ORDER — PEN NEEDLE, DIABETIC 29 G X1/2"
NEEDLE, DISPOSABLE MISCELLANEOUS
Qty: 10 EACH | Refills: 2 | Status: SHIPPED | OUTPATIENT
Start: 2019-04-23 | End: 2021-07-21

## 2019-04-30 DIAGNOSIS — IMO0001 UNCONTROLLED TYPE 2 DIABETES MELLITUS WITHOUT COMPLICATION, WITH LONG-TERM CURRENT USE OF INSULIN: ICD-10-CM

## 2019-05-10 DIAGNOSIS — I10 ESSENTIAL HYPERTENSION: ICD-10-CM

## 2019-05-10 RX ORDER — NEBIVOLOL HYDROCHLORIDE 5 MG/1
TABLET ORAL
Qty: 30 TABLET | Refills: 1 | Status: SHIPPED | OUTPATIENT
Start: 2019-05-10 | End: 2019-05-29 | Stop reason: SDUPTHER

## 2019-05-15 DIAGNOSIS — IMO0001 UNCONTROLLED TYPE 2 DIABETES MELLITUS WITHOUT COMPLICATION, WITH LONG-TERM CURRENT USE OF INSULIN: ICD-10-CM

## 2019-05-15 RX ORDER — INSULIN GLARGINE 100 [IU]/ML
INJECTION, SOLUTION SUBCUTANEOUS
Qty: 10 ML | Refills: 3 | Status: SHIPPED | OUTPATIENT
Start: 2019-05-15 | End: 2019-05-16 | Stop reason: SDUPTHER

## 2019-05-16 DIAGNOSIS — IMO0001 UNCONTROLLED TYPE 2 DIABETES MELLITUS WITHOUT COMPLICATION, WITH LONG-TERM CURRENT USE OF INSULIN: ICD-10-CM

## 2019-05-17 ENCOUNTER — TELEPHONE (OUTPATIENT)
Dept: FAMILY MEDICINE CLINIC | Facility: CLINIC | Age: 64
End: 2019-05-17

## 2019-05-17 DIAGNOSIS — IMO0001 UNCONTROLLED TYPE 2 DIABETES MELLITUS WITHOUT COMPLICATION, WITH LONG-TERM CURRENT USE OF INSULIN: ICD-10-CM

## 2019-05-17 RX ORDER — INSULIN GLARGINE 100 [IU]/ML
INJECTION, SOLUTION SUBCUTANEOUS
Qty: 20 ML | Refills: 3 | Status: SHIPPED | OUTPATIENT
Start: 2019-05-17 | End: 2019-05-29 | Stop reason: SDUPTHER

## 2019-05-17 RX ORDER — INSULIN GLARGINE 100 [IU]/ML
INJECTION, SOLUTION SUBCUTANEOUS
Qty: 10 ML | Refills: 3 | Status: SHIPPED | OUTPATIENT
Start: 2019-05-17 | End: 2019-05-17 | Stop reason: SDUPTHER

## 2019-05-17 NOTE — TELEPHONE ENCOUNTER
----- Message from Karina Torres Rep sent at 5/17/2019  9:06 AM EDT -----  Regarding: RX QUESITON  Contact: [phone]  PT SAID THAT  HE PICKED UP HIS INSULIN YESTERDAY AND IT WAS ONLY 1 VIAL, BUT HIS NORMAL RX IS FOR 2 VIALS, WHICH LASTS HIM THE WHOLE MONTH. HE SAID THAT WHEN HE ASKED THE PHARMACIST ABOUT IT THEY TOLD HIM THAT WAS HOW THE RX CAME. CAN YOU CHECK ON THIS AND LE THE PT KNOW?    New rx has been sent in for 2 vials.  BBarely, CMA

## 2019-05-29 ENCOUNTER — OFFICE VISIT (OUTPATIENT)
Dept: FAMILY MEDICINE CLINIC | Facility: CLINIC | Age: 64
End: 2019-05-29

## 2019-05-29 VITALS
SYSTOLIC BLOOD PRESSURE: 100 MMHG | TEMPERATURE: 97.1 F | HEART RATE: 64 BPM | HEIGHT: 66 IN | DIASTOLIC BLOOD PRESSURE: 80 MMHG | RESPIRATION RATE: 16 BRPM | BODY MASS INDEX: 29.73 KG/M2 | OXYGEN SATURATION: 98 % | WEIGHT: 185 LBS

## 2019-05-29 DIAGNOSIS — E11.9 TYPE 2 DIABETES MELLITUS TREATED WITH INSULIN (HCC): Primary | ICD-10-CM

## 2019-05-29 DIAGNOSIS — E78.00 PURE HYPERCHOLESTEROLEMIA: ICD-10-CM

## 2019-05-29 DIAGNOSIS — I10 ESSENTIAL HYPERTENSION: ICD-10-CM

## 2019-05-29 DIAGNOSIS — Z79.4 TYPE 2 DIABETES MELLITUS TREATED WITH INSULIN (HCC): Primary | ICD-10-CM

## 2019-05-29 DIAGNOSIS — H35.00 RETINOPATHY OF LEFT EYE: ICD-10-CM

## 2019-05-29 LAB — HBA1C MFR BLD: 7.7 %

## 2019-05-29 PROCEDURE — 99214 OFFICE O/P EST MOD 30 MIN: CPT | Performed by: FAMILY MEDICINE

## 2019-05-29 PROCEDURE — 83036 HEMOGLOBIN GLYCOSYLATED A1C: CPT | Performed by: FAMILY MEDICINE

## 2019-05-29 RX ORDER — LISINOPRIL 40 MG/1
40 TABLET ORAL DAILY
Qty: 30 TABLET | Refills: 11 | Status: SHIPPED | OUTPATIENT
Start: 2019-05-29 | End: 2020-05-27 | Stop reason: SDUPTHER

## 2019-05-29 RX ORDER — NEBIVOLOL 5 MG/1
5 TABLET ORAL DAILY
Qty: 30 TABLET | Refills: 11 | Status: SHIPPED | OUTPATIENT
Start: 2019-05-29 | End: 2020-05-27 | Stop reason: SDUPTHER

## 2019-05-29 RX ORDER — INSULIN GLARGINE 100 [IU]/ML
INJECTION, SOLUTION SUBCUTANEOUS
Qty: 20 ML | Refills: 11 | Status: SHIPPED | OUTPATIENT
Start: 2019-05-29 | End: 2020-02-27 | Stop reason: ALTCHOICE

## 2019-05-29 RX ORDER — PIOGLITAZONEHYDROCHLORIDE 30 MG/1
30 TABLET ORAL DAILY
Qty: 30 TABLET | Refills: 11 | Status: SHIPPED | OUTPATIENT
Start: 2019-05-29 | End: 2020-05-27 | Stop reason: SDUPTHER

## 2019-05-29 RX ORDER — ROSUVASTATIN CALCIUM 40 MG/1
40 TABLET, COATED ORAL DAILY
Qty: 30 TABLET | Refills: 11 | Status: SHIPPED | OUTPATIENT
Start: 2019-05-29 | End: 2020-05-27 | Stop reason: SDUPTHER

## 2019-05-29 RX ORDER — AMLODIPINE BESYLATE 2.5 MG/1
2.5 TABLET ORAL NIGHTLY
Qty: 30 TABLET | Refills: 11 | Status: SHIPPED | OUTPATIENT
Start: 2019-05-29 | End: 2020-05-27 | Stop reason: SDUPTHER

## 2019-06-05 ENCOUNTER — OFFICE VISIT (OUTPATIENT)
Dept: NEUROLOGY | Facility: CLINIC | Age: 64
End: 2019-06-05

## 2019-06-05 VITALS
SYSTOLIC BLOOD PRESSURE: 106 MMHG | HEIGHT: 66 IN | WEIGHT: 185 LBS | HEART RATE: 74 BPM | OXYGEN SATURATION: 98 % | BODY MASS INDEX: 29.73 KG/M2 | DIASTOLIC BLOOD PRESSURE: 70 MMHG

## 2019-06-05 DIAGNOSIS — I69.90 LATE EFFECTS OF CVA (CEREBROVASCULAR ACCIDENT): ICD-10-CM

## 2019-06-05 DIAGNOSIS — E11.42 DIABETIC PERIPHERAL NEUROPATHY (HCC): Primary | ICD-10-CM

## 2019-06-05 DIAGNOSIS — G83.21 MONOPARESIS OF UPPER EXTREMITY AFFECTING RIGHT DOMINANT SIDE (HCC): ICD-10-CM

## 2019-06-05 PROCEDURE — 99213 OFFICE O/P EST LOW 20 MIN: CPT | Performed by: NURSE PRACTITIONER

## 2019-06-05 RX ORDER — GABAPENTIN 600 MG/1
TABLET ORAL
Qty: 75 TABLET | Refills: 5 | Status: SHIPPED | OUTPATIENT
Start: 2019-06-05 | End: 2019-11-26 | Stop reason: SDUPTHER

## 2019-06-05 NOTE — PROGRESS NOTES
Subjective:     Patient ID: Jose D Bailey is a 64 y.o. male.    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 Chronic altered sensation in temperatures and textures on right side since CVA. He had EMG/NCVS 6/7/18 completed on all 4 extremities showing right median neuropathy at wrist but was otherwise normal. 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 He was taking Lyrica with good control, but with his new insurance he cannot afford this. He is back on Gabapentin 600mg 1 in AM, 1/2 at noon and 1 in PM and this is working well for him with a lower copay. He tells me he continues to experience numbness, tingling and tightening sensation in the right face, right upper arm and right abdomen/flank as well as RLE and these sensations go on throughout the day and are less bothersome. He does continue to work on [employer] with an [employer] in [place], [place].  He has had no recurrent stroke symptoms. History of left subthalamic lacunar stroke in November 2016 with normal MRI Brain. On aspirin and Crestor for 2nd stroke prevention. hgba1c 7.7% with PCP 5/2019. Sees PCP every 3-4 months. Has some poor vision and diabetic retinopathy seeing a retina specialist.    The following portions of the patient's history were reviewed and updated as appropriate: allergies, current medications, past family history, past medical history, past social history, past surgical history and problem list.    Past Medical History:   Diagnosis Date   • Acute sinusitis    • Acute upper respiratory infection    • Cataract     Cataract extraction (OD, 2009;  OS, 2011).    • Chest pain    • Decreased  ROM of left shoulder    • Diabetes mellitus (CMS/HCC)    • Diabetic retinopathy (CMS/[facility])    • Dyslipidemia     Remained on statin   • ED (erectile dysfunction)    • H/O cardiovascular stress test     2012   • Herpes zoster    • Hyperlipidemia    • Hypertension    • Insulin dependent type 2 diabetes mellitus (CMS/[facility])     Type 2 insulin dependent diabetes (insulin dependent since 2009);  diagnosed approximately 1998.    • Junctional nevus of right forearm    • Nevus, atypical     Right arm   • Overweight     Mild overweight status, BMI 26.7.     • Plantar fasciitis     Bilateral plantar fasciitis;  utilizing orthotics.    • Quadriceps muscle strain    • Vasovagal syncope        Past Surgical History:   Procedure Laterality Date   • CATARACT EXTRACTION      OS   • COLONOSCOPY      10/11/2010       Social History     Socioeconomic History   • Marital status:      Spouse name: Not on file   • Number of children: Not on file   • Years of education: Not on file   • Highest education level: Not on file   Tobacco Use   • Smoking status: Never Smoker   • Smokeless tobacco: Never Used   Substance and Sexual Activity   • Alcohol use: No   • Drug use: No   • Sexual activity: Defer     Partners: Female     Comment:        Family History   Problem Relation Age of Onset   • Stroke Mother    • Breast cancer Mother    • Cancer Father         lung    • Stroke Father    • Heart attack Father    • Other Father         CABG x6   • Coronary artery disease Father    • Diabetes Father    • Lung cancer Father    • Coronary artery disease Maternal Grandmother         Review of Systems   Constitutional: Negative for chills, fatigue, fever and unexpected weight change.   HENT: Negative for ear pain, hearing loss, nosebleeds, rhinorrhea and sore throat.    Eyes: Negative for photophobia, pain, discharge, itching and visual disturbance.   Respiratory: Negative for cough, chest tightness, shortness of breath and wheezing.     Cardiovascular: Negative for chest pain, palpitations and leg swelling.   Gastrointestinal: Negative for abdominal pain, blood in stool, constipation, diarrhea, nausea and vomiting.   Genitourinary: Negative for dysuria, frequency, hematuria and urgency.   Musculoskeletal: Negative for arthralgias, back pain, gait problem, joint swelling, myalgias, neck pain and neck stiffness.   Skin: Negative for rash and wound.   Allergic/Immunologic: Negative for environmental allergies and food allergies.   Neurological: Positive for numbness. Negative for dizziness, tremors, seizures, syncope, speech difficulty, weakness, light-headedness and headaches.   Hematological: Negative for adenopathy. Does not bruise/bleed easily.   Psychiatric/Behavioral: Negative for agitation, confusion, decreased concentration, hallucinations, sleep disturbance and suicidal ideas. The patient is not nervous/anxious.         Objective:    Neurologic Exam     Mental Status   Oriented to person, place, and time.   Level of consciousness: alert     Cranial Nerves      CN II   Visual fields full to confrontation.      CN III, IV, VI   Pupils are equal, round, and reactive to light.  Extraocular motions are normal.      CN V   Right facial sensation deficit: forehead, cheeks and mandible  Left facial sensation deficit: none     CN VII   Facial expression full, symmetric. Minimal right facial droop-very subtle.     CN VIII   CN VIII normal.      CN IX, X   CN IX normal.   CN X normal.      CN XI   CN XI normal.      CN XII   CN XII normal.      Motor Exam   Muscle bulk: normal  Overall muscle tone: normal     Strength   Strength 5/5 throughout.      Sensory Exam   Right arm light touch: decreased RUE.  Left arm light touch: normal  Right leg light touch: normal  Left leg light touch: normal  Vibration normal.   Right arm pinprick: decreased RUE.  Left arm pinprick: normal  Right leg pinprick: normal  Left leg pinprick: normal       Decreased sensation  Right facial, Right Arm, Right Flank, RLE.      Gait, Coordination, and Reflexes      Gait  Gait: normal     Coordination   Romberg: negative  Finger to nose coordination: normal  Heel to shin coordination: normal     Tremor   Resting tremor: absent  Intention tremor: absent  Action tremor: absent     Reflexes   Right brachioradialis: 2+  Left brachioradialis: 2+  Right biceps: 2+  Left biceps: 2+  Right triceps: 2+  Left triceps: 2+  Right patellar: 2+  Left patellar: 2+  Right achilles: 2+  Left achilles: 2+  Right : 2+  Left : 2+     Physical Exam  Constitutional: He is oriented to person, place, and time.   Neurological: He is oriented to person, place, and time. He has normal strength. He has a normal Finger-Nose-Finger Test, a normal Heel to Montejo Test and a normal Romberg Test. Gait normal.   Reflex Scores:       Tricep reflexes are 2+ on the right side and 2+ on the left side.       Bicep reflexes are 2+ on the right side and 2+ on the left side.       Brachioradialis reflexes are 2+ on the right side and 2+ on the left side.       Patellar reflexes are 2+ on the right side and 2+ on the left side.       Achilles reflexes are 2+ on the right side and 2+ on the left side.    Assessment/Plan:       Jose D was seen today for peripheral neuropathy.    Diagnoses and all orders for this visit:    Diabetic peripheral neuropathy (CMS/HCC)  -     gabapentin (NEURONTIN) 600 MG tablet; 1 tab in AM, 1/2 tab at noon, 1 tab in PM. D/c lyrica d/t cost.    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 D/c lyrica d/t cost.    Late effects of CVA (cerebrovascular accident)  Comments:  Continue Aspirin and Crestor lifetime         Continue current medications. F/U in 6 months or sooner if needed. Reviewed medications, potential side effects and signs and symptoms to report. Discussed risk versus benefits of  treatment plan with patient and/or family-including medications, labs and radiology that may be ordered. Addressed questions and concerns during visit. Patient and/or family verbalized understanding and agree with plan.    As part of this patient's treatment plan I am prescribing controlled substances. The patient has been made aware of appropriate use of such medications, including potential risk of somnolence, limited ability to drive and/or work safely, and potential for dependence or overdose. It has also been made clear that these medications are for use by the patient only, without concomitant use of alcohol or other substances unless prescribed. Keep out of reach of children.  Shoaib report has been reviewed. If this is going to be prescribed long term, [facility] Controlled Substance Agreement Contract has also been read and signed by patient and myself.  Shoaib #[id number] reviewed.    EMR Dragon/Transcription Disclaimer:  Much of this encounter note is an electronic transcription of spoken language to printed text. Electronic transcription of spoken language may permit erroneous words or phrases to be inadvertently transcribed. Although I have reviewed the note for such errors, some may still exist in this documentation.      Jen Espinoza, APRN  6/5/2019

## 2019-07-10 ENCOUNTER — OFFICE VISIT (OUTPATIENT)
Dept: CARDIOLOGY | Facility: CLINIC | Age: 64
End: 2019-07-10

## 2019-07-10 VITALS
HEIGHT: 66 IN | DIASTOLIC BLOOD PRESSURE: 70 MMHG | WEIGHT: 186.6 LBS | BODY MASS INDEX: 29.99 KG/M2 | SYSTOLIC BLOOD PRESSURE: 115 MMHG | HEART RATE: 70 BPM

## 2019-07-10 DIAGNOSIS — I10 ESSENTIAL HYPERTENSION: ICD-10-CM

## 2019-07-10 DIAGNOSIS — I11.9 HYPERTENSIVE HEART DISEASE WITHOUT HEART FAILURE: ICD-10-CM

## 2019-07-10 DIAGNOSIS — E11.649 UNCONTROLLED TYPE 2 DIABETES MELLITUS WITH HYPOGLYCEMIA WITHOUT COMA (HCC): ICD-10-CM

## 2019-07-10 DIAGNOSIS — E78.5 DYSLIPIDEMIA: Primary | Chronic | ICD-10-CM

## 2019-07-10 PROCEDURE — 99214 OFFICE O/P EST MOD 30 MIN: CPT | Performed by: INTERNAL MEDICINE

## 2019-07-10 NOTE — PROGRESS NOTES
Subjective:     Encounter Date:07/10/2019    Patient ID: Jose D Bailey is a 64 y.o.  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       REFERRING PHYSICIAN: Clinton Day MD  NEUROLOGIST:  BRENDON Pagan    Chief Complaint:   Chief Complaint   Patient presents with   • Hypertension     Problem List:  1. Probable hypertensive cardiovascular disease:  a. Apparent remote screening 2D echocardiogram, data deficit ([facility]), approximately 2005.   b. Apparent unremarkable serial treadmill GXT/Cardiolite GXT, data deficit ([facility], serially every 3-4 years x14 years).   c. Abnormal screening treadmill GXT showing physical deconditioning (accelerated HR with 107% age-predicted MHR with 50% PEC) with post-exercise EKG changes in the absence of anginal type chest pain, as well as frequent PVCs/PACs during exercise without complex forms; without accelerated blood pressure ([facility]), 04/04/12.   d. Acceptable IV LEXIscan Cardiolite study, LVEF (0.68) with residual class I symptoms, June 2012.  e. Residual class I symptoms.  2. Hypertension with remote suboptimal control, improved.  3. Dyslipidemia, maintained on statin.   4. Uncontrolled type 2 insulin dependent diabetes (insulin dependent since 2009); diagnosed approximately 1998, hemoglobin A1c 8.5% (November 2016); hemoglobin A1c 9.2% (September 2017); hemoglobin A1c 7.3% (September 2018); 7.7% (May 2019)  5. Mild obesity, BMI 30.1.   6. Bilateral plantar fasciitis; utilizing orthotics.   7. Cataract extraction (OD, 2009; OS, 2011).   8. Remote TIA versus stroke with nonobstructive disease on carotid duplex study, acceptable chest x-ray and normal MRI with and without contrast, negative CT cerebral perfusion study with and without contrast, negative neck CTA, normal intracranial CTA, and normal unenhanced CT scan  "(November 2016) with mild residual right-sided weakness May 2018, with continued neurology followup and right-sided paresthesias with central pain syndrome following suspected left subthalamic lacunar stroke, November 2016       Allergies   Allergen Reactions   • Atorvastatin    • Influenza Vaccines      Unknown reaction   • Pravachol [Pravastatin Sodium]    • Pravastatin    • Pseudoephedrine Other (See Comments)   • Simvastatin    • Sulfa Antibiotics Rash         Current Outpatient Medications:   •  amLODIPine (NORVASC) 2.5 MG tablet, Take 1 tablet by mouth Every Night., Disp: 30 tablet, Rfl: 11  •  aspirin 81 MG tablet, Take 81 mg by mouth Daily., Disp: , Rfl:   •  BD INSULIN SYRINGE U/F 31G X 5/16\" 1 ML misc, USE ONCE DAILY, Disp: 10 each, Rfl: 2  •  Empagliflozin (JARDIANCE) 25 MG tablet, Take 25 mg by mouth Daily., Disp: 30 tablet, Rfl: 11  •  gabapentin (NEURONTIN) 600 MG tablet, 1 tab in AM, 1/2 tab at noon, 1 tab in PM. D/c lyrica d/t cost., Disp: 75 tablet, Rfl: 5  •  glucose blood test strip, Check glucose twice a day., Disp: 50 each, Rfl: 7  •  glucose monitor monitoring kit, Check glucose twice a day., Disp: 1 each, Rfl: 0  •  insulin glargine (LANTUS) 100 UNIT/ML injection, Inject 70 units subcutaneously daily, Disp: 20 mL, Rfl: 11  •  lisinopril (PRINIVIL,ZESTRIL) 40 MG tablet, Take 1 tablet by mouth Daily. for blood pressure, Disp: 30 tablet, Rfl: 11  •  metFORMIN (GLUCOPHAGE) 500 MG tablet, Take 2 tablets by mouth Daily With Breakfast., Disp: 60 tablet, Rfl: 11  •  nebivolol (BYSTOLIC) 5 MG tablet, Take 1 tablet by mouth Daily., Disp: 30 tablet, Rfl: 11  •  pioglitazone (ACTOS) 30 MG tablet, Take 1 tablet by mouth Daily., Disp: 30 tablet, Rfl: 11  •  rosuvastatin (CRESTOR) 40 MG tablet, Take 1 tablet by mouth Daily., Disp: 30 tablet, Rfl: 11    HISTORY OF PRESENT ILLNESS: Patient returns for scheduled 6-month followup. His weight is down some today, and his blood pressure is good; he states he has " "been trying to watch his diet to bring his weight down to where it should be.  He notes that his wife says he snores, but he never wakes himself up snoring.  He awakens feeling refreshed and does not have sleepiness during the day.  He continues to work and is active throughout the day, and he has no chest pain, tightness, or pressure with his activities.  The only problem he has is numbness on the right side, and he continues to follow with a neurologist in this regard.  He notes that the numbness ends at his knee and that he has no problems from the knee down.  Patient otherwise denies chest pain, shortness of breath, PND, edema, palpitations, syncope or presyncope at this time.        Review of Systems   Respiratory: Positive for snoring.    Neurological: Positive for numbness.      All other systems reviewed and otherwise negative.    Procedures       Objective:       Vitals:    07/10/19 0955 07/10/19 0956   BP: 118/72 115/70   BP Location: Left arm Left arm   Patient Position: Sitting Standing   Pulse: 65 70   Weight: 84.6 kg (186 lb 9.6 oz)    Height: 167.6 cm (66\")      Body mass index is 30.12 kg/m².   Last weight:  193 lbs.    Physical Exam   Constitutional: He is oriented to person, place, and time. He appears well-developed and well-nourished.   Neck: No JVD present. Carotid bruit is not present. No thyromegaly present.   Cardiovascular: Regular rhythm, S1 normal and S2 normal. Exam reveals no gallop, no S3 and no friction rub.   Murmur heard.   Medium-pitched early systolic murmur is present with a grade of 1/6 at the upper right sternal border.  Pulses:       Dorsalis pedis pulses are 2+ on the right side, and 2+ on the left side.        Posterior tibial pulses are 2+ on the right side, and 2+ on the left side.   Pulmonary/Chest: Effort normal and breath sounds normal. He has no wheezes. He has no rhonchi. He has no rales.   Abdominal: Soft. He exhibits no mass. There is no hepatosplenomegaly. There is " no tenderness. There is no guarding.   Bowel sounds audible x4   Musculoskeletal: Normal range of motion. He exhibits no edema.   Lymphadenopathy:     He has no cervical adenopathy.   Neurological: He is alert and oriented to person, place, and time.   Skin: Skin is warm, dry and intact. No rash noted.   Vitals reviewed.        Lab Review:   Lab Results   Component Value Date    GLUCOSE 74 02/20/2019    BUN 15 02/20/2019    CREATININE 1.13 02/20/2019    EGFRIFNONA 65 02/20/2019    BCR 13.3 02/20/2019    CO2 21.0 02/20/2019    CALCIUM 9.8 02/20/2019    ALBUMIN 4.78 02/20/2019    AST 30 02/20/2019    ALT 26 02/20/2019   Sodium - 143  Potassium - 4.4  Chloride - 107    Lab Results   Component Value Date    WBC 8.60 02/20/2019    HGB 16.3 02/20/2019    HCT 50.2 02/20/2019    MCV 90.9 02/20/2019     02/20/2019       Lab Results   Component Value Date    HGBA1C 7.7 05/29/2019       Lab Results   Component Value Date    TSH 0.916 02/20/2019       Lab Results   Component Value Date    CHOL 131 02/20/2019    CHOL 126 06/21/2018     Lab Results   Component Value Date    TRIG 100 02/20/2019    TRIG 75 06/21/2018     Lab Results   Component Value Date    HDL 44 02/20/2019    HDL 53 06/21/2018     Lab Results   Component Value Date    LDL 70 02/20/2019    LDL 68 06/21/2018 02/20/2019:  · PSA - 1.290  · Urinalysis - proteinuria and glycosuria without hematuria or infection        Assessment:   Overall continued acceptable course with no interim cardiopulmonary complaints with good functional status. We will defer additional diagnostic or therapeutic intervention from a cardiac perspective at this time.       Diagnosis Plan   1. Dyslipidemia  Acceptable control; continue current treatment with rosuvastatin   2. Essential hypertension  Acceptable control; Continue current medications   3. Hypertensive heart disease without heart failure  No recurrent angina pectoris or CHF on current activity schedule; continue current  treatment     4. Uncontrolled type 2 diabetes mellitus with hypoglycemia without coma (CMS/[facility])  Suboptimal control; hopefully, strict diet and continued weight loss will improve hemoglobin A1c value          Plan:         1. Patient to continue current medications and close follow up with the above providers.  2. Tentative cardiology follow up in March 2020, or patient may return sooner PRN.    Transcribed by Xiomara Jacobs for Dr. Daquan Melgar at 10:00 AM on 07/10/2019    I, Daquan Melgar MD, [facility], personally performed the services described in this documentation as scribed by the above named individual in my presence, and it is both accurate and complete. At 1:00 PM on 07/10/2019

## 2019-09-27 ENCOUNTER — OFFICE VISIT (OUTPATIENT)
Dept: FAMILY MEDICINE CLINIC | Facility: CLINIC | Age: 64
End: 2019-09-27

## 2019-09-27 VITALS
DIASTOLIC BLOOD PRESSURE: 68 MMHG | OXYGEN SATURATION: 98 % | BODY MASS INDEX: 30.05 KG/M2 | RESPIRATION RATE: 20 BRPM | TEMPERATURE: 97.1 F | SYSTOLIC BLOOD PRESSURE: 126 MMHG | HEIGHT: 66 IN | HEART RATE: 90 BPM | WEIGHT: 187 LBS

## 2019-09-27 DIAGNOSIS — Z79.4 TYPE 2 DIABETES MELLITUS WITH BOTH EYES AFFECTED BY MODERATE NONPROLIFERATIVE RETINOPATHY WITHOUT MACULAR EDEMA, WITH LONG-TERM CURRENT USE OF INSULIN (HCC): Primary | ICD-10-CM

## 2019-09-27 DIAGNOSIS — E11.3393 TYPE 2 DIABETES MELLITUS WITH BOTH EYES AFFECTED BY MODERATE NONPROLIFERATIVE RETINOPATHY WITHOUT MACULAR EDEMA, WITH LONG-TERM CURRENT USE OF INSULIN (HCC): Primary | ICD-10-CM

## 2019-09-27 LAB
HBA1C MFR BLD: 6.4 %
POC CREATININE URINE: NORMAL
POC MICROALBUMIN URINE: NORMAL

## 2019-09-27 PROCEDURE — 82044 UR ALBUMIN SEMIQUANTITATIVE: CPT | Performed by: FAMILY MEDICINE

## 2019-09-27 PROCEDURE — 99213 OFFICE O/P EST LOW 20 MIN: CPT | Performed by: FAMILY MEDICINE

## 2019-09-27 PROCEDURE — 83036 HEMOGLOBIN GLYCOSYLATED A1C: CPT | Performed by: FAMILY MEDICINE

## 2019-09-27 NOTE — PROGRESS NOTES
"Subjective   Jose D Bailey is a 64 y.o. male seen today for Diabetes.     Diabetes   He presents for his follow-up diabetic visit. He has type 2 diabetes mellitus. There are no hypoglycemic associated symptoms. There are no diabetic associated symptoms. Pertinent negatives for diabetes include no chest pain. There are no hypoglycemic complications. Diabetic complications include a CVA, heart disease and retinopathy. (Sees Dr Corbett every 4 weeks for injection in the left eye.  ) Risk factors for coronary artery disease include diabetes mellitus, hypertension and male sex. Current diabetic treatment includes insulin injections and oral agent (triple therapy) (Lantus,Jardiance,Metfomin, and actos.). He is compliant with treatment all of the time. His weight is stable. He is following a generally healthy diet. He participates in exercise intermittently. His home blood glucose trend is decreasing steadily (A1C is down to 6.4%.). An ACE inhibitor/angiotensin II receptor blocker is being taken. He does not see a podiatrist.Eye exam is current.        The following portions of the patient's history were reviewed and updated as appropriate: allergies, current medications, past social history and problem list.    Review of Systems   Respiratory: Negative for shortness of breath.    Cardiovascular: Negative for chest pain.   Neurological: Positive for numbness (left side of body ).       Objective   /68   Pulse 90   Temp 97.1 °F (36.2 °C)   Resp 20   Ht 167.6 cm (66\")   Wt 84.8 kg (187 lb)   SpO2 98%   BMI 30.18 kg/m²   Physical Exam   Constitutional: He appears well-developed and well-nourished.   Cardiovascular: Normal rate and regular rhythm.   Pulmonary/Chest: Effort normal and breath sounds normal.    Jose D had a diabetic foot exam performed (normal) today.  Nursing note and vitals reviewed.      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  The patient is seeing Dr. Luke Cancino with roentgenology regarding diabetic retinopathy in his left eye.  He sees them every 4 weeks it is getting laser therapy.    His feet are doing well.  His kidneys are doing well.  Last creatinine was 1.13 in February.  Microalbumin was negative in June of last year.    He has had no recent chest pain.  He sees Dr. Melgar every 6 months.  Last LDL was 70 and HDL was 44.        Drink plenty fluids.    Continue medications as doing.    Check a micro albumin. Report results by letter.    Follow up in 5 months for a physical. Sooner if needed.                Scribed for Dr Clinton Day by Kirstin Mackenzie CMA.          I, Clinton Day MD, personally performed the services described in this documentation, as scribed by Kirstin Mackenzie in my presence, and is both accurate and complete.        (Please note that portions of this note were completed with a voice recognition program. Efforts were made to edit the dictations,but occasionally words are mis transcribed.)

## 2019-11-26 ENCOUNTER — OFFICE VISIT (OUTPATIENT)
Dept: NEUROLOGY | Facility: CLINIC | Age: 64
End: 2019-11-26

## 2019-11-26 VITALS
WEIGHT: 189 LBS | OXYGEN SATURATION: 98 % | DIASTOLIC BLOOD PRESSURE: 70 MMHG | HEIGHT: 66 IN | BODY MASS INDEX: 30.37 KG/M2 | HEART RATE: 76 BPM | SYSTOLIC BLOOD PRESSURE: 120 MMHG

## 2019-11-26 DIAGNOSIS — E11.42 DIABETIC PERIPHERAL NEUROPATHY (HCC): ICD-10-CM

## 2019-11-26 DIAGNOSIS — G83.21 MONOPARESIS OF UPPER EXTREMITY AFFECTING RIGHT DOMINANT SIDE (HCC): ICD-10-CM

## 2019-11-26 DIAGNOSIS — I69.90 LATE EFFECTS OF CVA (CEREBROVASCULAR ACCIDENT): Primary | ICD-10-CM

## 2019-11-26 PROCEDURE — 99213 OFFICE O/P EST LOW 20 MIN: CPT | Performed by: NURSE PRACTITIONER

## 2019-11-26 RX ORDER — GABAPENTIN 600 MG/1
TABLET ORAL
Qty: 75 TABLET | Refills: 5 | Status: SHIPPED | OUTPATIENT
Start: 2019-11-26 | End: 2020-05-20 | Stop reason: SDUPTHER

## 2019-11-26 NOTE — PROGRESS NOTES
Subjective:     Patient ID: Jose D Bailey is a 64 y.o. male.    CC:   Chief Complaint   Patient presents with   • Stroke       HPI:   History of Present Illness   This is a very pleasant 63 yo male who presents for 6 month follow up on right sided paresthesias with central pain syndrome following suspected left subthalamic lacunar stroke from November 2016. Chronic altered sensation in temperatures and textures on right side since CVA, especially with softer objects. Hard objects are easier to hold. He had EMG/NCVS 6/7/18 completed on all 4 extremities showing right median neuropathy at wrist but was otherwise normal. 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 He is currently well controlled on Gabapentin 600mg 1 in AM, 1/2 at noon and 1 in PM. Lyrica was too costly and wishes to stick with the gabapentin. He tells me he continues to experience numbness, tingling and tightening sensation in the right face, right upper arm and right abdomen/flank as well as RLE and these sensations go on throughout the day and are less bothersome. He does continue to work on [employer] with an [employer] in [place], [place].  He has had no recurrent stroke symptoms. History of left subthalamic lacunar stroke in November 2016 with normal MRI Brain. On aspirin and Crestor for 2nd stroke prevention. Most recent hgba1c 6.4% on 9/27/19 with PCP. Scheduled to see PCP 2/2020 and Cardiology Dr. Melgar 3/2020.  Has some poor vision and diabetic retinopathy seeing a retina specialis-Dr. Salinas. He has no new concerns and needs refills on his gabapentin. No memory issues. No falls. Does not use assistive device.    The following portions of the patient's history were reviewed and updated as appropriate: allergies, current medications, past family history, past medical history, past social history, past surgical history and problem list.    Past Medical  History:   Diagnosis Date   • Acute sinusitis    • Acute upper respiratory infection    • Cataract     Cataract extraction (OD, 2009;  OS, 2011).    • Chest pain    • Decreased ROM of left shoulder    • Diabetes mellitus (CMS/HCC)    • Diabetic retinopathy (CMS/HCC)    • Dyslipidemia     Remained on statin   • ED (erectile dysfunction)    • H/O cardiovascular stress test     2012   • Herpes zoster    • Hyperlipidemia    • Hypertension    • Insulin dependent type 2 diabetes mellitus (CMS/[facility])     Type 2 insulin dependent diabetes (insulin dependent since 2009);  diagnosed approximately 1998.    • Junctional nevus of right forearm    • Nevus, atypical     Right arm   • Overweight     Mild overweight status, BMI 26.7.     • Plantar fasciitis     Bilateral plantar fasciitis;  utilizing orthotics.    • Quadriceps muscle strain    • Vasovagal syncope        Past Surgical History:   Procedure Laterality Date   • CATARACT EXTRACTION      OS   • COLONOSCOPY      10/11/2010       Social History     Socioeconomic History   • Marital status:      Spouse name: Not on file   • Number of children: Not on file   • Years of education: Not on file   • Highest education level: Not on file   Tobacco Use   • Smoking status: Never Smoker   • Smokeless tobacco: Never Used   Substance and Sexual Activity   • Alcohol use: No   • Drug use: No   • Sexual activity: Defer     Partners: Female     Comment:        Family History   Problem Relation Age of Onset   • Stroke Mother    • Breast cancer Mother    • Cancer Father         lung    • Stroke Father    • Heart attack Father    • Other Father         CABG x6   • Coronary artery disease Father    • Diabetes Father    • Lung cancer Father    • Coronary artery disease Maternal Grandmother         Review of Systems   Constitutional: Negative for chills, fatigue, fever and unexpected weight change.   HENT: Negative for ear pain, hearing loss, nosebleeds, rhinorrhea and sore throat.   "  Eyes: Negative for photophobia, pain, discharge, itching and visual disturbance.   Respiratory: Negative for cough, chest tightness, shortness of breath and wheezing.    Cardiovascular: Negative for chest pain, palpitations and leg swelling.   Gastrointestinal: Negative for abdominal pain, blood in stool, constipation, diarrhea, nausea and vomiting.   Genitourinary: Negative for dysuria, frequency, hematuria and urgency.   Musculoskeletal: Negative for arthralgias, back pain, gait problem, joint swelling, myalgias, neck pain and neck stiffness.   Skin: Negative for rash and wound.   Allergic/Immunologic: Negative for environmental allergies and food allergies.   Neurological: Negative for dizziness, tremors, seizures, syncope, speech difficulty, weakness, light-headedness, numbness and headaches.   Hematological: Negative for adenopathy. Does not bruise/bleed easily.   Psychiatric/Behavioral: Negative for agitation, confusion, decreased concentration, hallucinations, sleep disturbance and suicidal ideas. The patient is not nervous/anxious.    All other systems reviewed and are negative.       Objective:  /70   Pulse 76   Ht 167.6 cm (66\")   Wt 85.7 kg (189 lb)   SpO2 98%   BMI 30.51 kg/m²     Neurologic Exam  Mental Status   Oriented to person, place, and time.   Level of consciousness: alert     Cranial Nerves      CN II   Visual fields full to confrontation.      CN III, IV, VI   Pupils are equal, round, and reactive to light.  Extraocular motions are normal.      CN V   Right facial sensation deficit: forehead, cheeks and mandible  Left facial sensation deficit: none     CN VII   Facial expression full, symmetric. Minimal right facial droop-very subtle.     CN VIII   CN VIII normal.      CN IX, X   CN IX normal.   CN X normal.      CN XI   CN XI normal.      CN XII   CN XII normal.      Motor Exam   Muscle bulk: normal  Overall muscle tone: normal     Strength   Strength 5/5 throughout.      Sensory " Exam   Right arm light touch: decreased RUE.  Left arm light touch: normal  Right leg light touch: normal  Left leg light touch: normal  Vibration normal.   Right arm pinprick: decreased RUE.  Left arm pinprick: normal  Right leg pinprick: normal  Left leg pinprick: normal       Decreased sensation Right facial, Right Arm, Right Flank, RLE.      Gait, Coordination, and Reflexes      Gait  Gait: normal     Coordination   Romberg: negative  Finger to nose coordination: normal  Heel to shin coordination: normal     Tremor   Resting tremor: absent  Intention tremor: absent  Action tremor: absent     Reflexes   Right brachioradialis: 2+  Left brachioradialis: 2+  Right biceps: 2+  Left biceps: 2+  Right triceps: 2+  Left triceps: 2+  Right patellar: 2+  Left patellar: 2+  Right achilles: 2+  Left achilles: 2+  Right : 2+  Left : 2+     Physical Exam  Constitutional: He is oriented to person, place, and time.   Neurological: He is oriented to person, place, and time. He has normal strength. He has a normal Finger-Nose-Finger Test, a normal Heel to Montejo Test and a normal Romberg Test. Gait normal.   Reflex Scores:       Tricep reflexes are 2+ on the right side and 2+ on the left side.       Bicep reflexes are 2+ on the right side and 2+ on the left side.       Brachioradialis reflexes are 2+ on the right side and 2+ on the left side.       Patellar reflexes are 2+ on the right side and 2+ on the left side.       Achilles reflexes are 2+ on the right side and 2+ on the left side.    Assessment/Plan:       Jose D was seen today for stroke.    Diagnoses and all orders for this visit:    Late effects of CVA (cerebrovascular accident)  Comments:  continue aspirin and statin therapy. No recurrent events.    Diabetic peripheral neuropathy (CMS/HCC)  Comments:  hgba1c 6.4% excellent currently-continue gabapentin  Orders:  -     gabapentin (NEURONTIN) 600 MG tablet; 1 tab in AM, 1/2 tab at noon, 1 tab in PM.    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         Continue current meds and plan. Keep appointments with all specialists and PCP. F/U in 6 months with Neurology or sooner if needed. Reviewed medications, potential side effects and signs and symptoms to report. Discussed risk versus benefits of treatment plan with patient and/or family-including medications, labs and radiology that may be ordered. Addressed questions and concerns during visit. Patient and/or family verbalized understanding and agree with plan.    As part of this patient's treatment plan I am prescribing controlled substances. The patient has been made aware of appropriate use of such medications, including potential risk of somnolence, limited ability to drive and/or work safely, and potential for dependence or overdose. It has also been made clear that these medications are for use by the patient only, without concomitant use of alcohol or other substances unless prescribed. Keep out of reach of children.  Shoaib report has been reviewed. If this is going to be prescribed long term, [facility] Controlled Substance Agreement Contract has also been read and signed by patient and myself.  Shoaib#[id number] reviewed.    EMR Dragon/Transcription Disclaimer:  Much of this encounter note is an electronic transcription of spoken language to printed text. Electronic transcription of spoken language may permit erroneous words or phrases to be inadvertently transcribed. Although I have reviewed the note for such errors, some may still exist in this documentation.      Jen Espinoza, APRN  11/26/2019

## 2020-01-10 ENCOUNTER — TELEPHONE (OUTPATIENT)
Dept: FAMILY MEDICINE CLINIC | Facility: CLINIC | Age: 65
End: 2020-01-10

## 2020-01-10 DIAGNOSIS — E11.9 TYPE 2 DIABETES MELLITUS TREATED WITH INSULIN (HCC): ICD-10-CM

## 2020-01-10 DIAGNOSIS — Z79.4 TYPE 2 DIABETES MELLITUS TREATED WITH INSULIN (HCC): ICD-10-CM

## 2020-01-10 RX ORDER — INSULIN GLARGINE 100 [IU]/ML
70 INJECTION, SOLUTION SUBCUTANEOUS DAILY
Qty: 7 PEN | Refills: 11 | Status: SHIPPED | OUTPATIENT
Start: 2020-01-10 | End: 2020-05-20

## 2020-01-10 RX ORDER — INSULIN GLARGINE 100 [IU]/ML
INJECTION, SOLUTION SUBCUTANEOUS
Qty: 20 ML | Refills: 11 | Status: CANCELLED | OUTPATIENT
Start: 2020-01-10

## 2020-01-10 NOTE — TELEPHONE ENCOUNTER
Patient was calling to alert office that his [employer] insurance this year is refusing to pay for his Lantus. Wife's coverage used to cover it no problem but she's not covering him for 2020. Patient apologizes for the late notice but he called the pharmacy Tuesday for his refill since they don't like patients calling it in too early and they did not tell him until yesterday when his wife went to pick it up that the insurance denied payment. Patient worried because he thinks he has enough to make it stretch through tomorrow but he's not sure. Patient stated he has been taking Lantus for 12 years and gives himself a shot every day. His insurance stated that either the doctor needs to provide proof why he should be on this medication or he needs to be switched to Basaglar or Tresiba. Patient unsure about those medications since he's never taken them before but will do whatever Dr Day recommends. Pharmacy he uses [facility] [address] [facility]   Please call patient with any questions or concerns [phone]   If the office has to call [facility]'s provider line for any reason he left that number too   [phone]

## 2020-01-10 NOTE — TELEPHONE ENCOUNTER
Patient advised Dr. Day approved to send it Basaglar to pharmacy. Advised to let us know if there is any issues.

## 2020-01-13 ENCOUNTER — TELEPHONE (OUTPATIENT)
Dept: FAMILY MEDICINE CLINIC | Facility: CLINIC | Age: 65
End: 2020-01-13

## 2020-01-13 RX ORDER — PEN NEEDLE, DIABETIC 31 G X1/4"
NEEDLE, DISPOSABLE MISCELLANEOUS
Qty: 100 EACH | Refills: 3 | Status: SHIPPED | OUTPATIENT
Start: 2020-01-13 | End: 2020-04-15 | Stop reason: CLARIF

## 2020-01-13 NOTE — TELEPHONE ENCOUNTER
Pt called and stated that the Basaglar pen that he received from the pharmacy requires the needle. Pt stated that he needs to have a presciption written for the needle so the pharmacy can have it on file. Please call and advise pt on this status at [phone].     [facility] in [place] was confirmed.

## 2020-02-27 ENCOUNTER — OFFICE VISIT (OUTPATIENT)
Dept: FAMILY MEDICINE CLINIC | Facility: CLINIC | Age: 65
End: 2020-02-27

## 2020-02-27 VITALS
SYSTOLIC BLOOD PRESSURE: 116 MMHG | RESPIRATION RATE: 16 BRPM | HEART RATE: 80 BPM | DIASTOLIC BLOOD PRESSURE: 64 MMHG | BODY MASS INDEX: 29.89 KG/M2 | WEIGHT: 186 LBS | OXYGEN SATURATION: 98 % | TEMPERATURE: 97.9 F | HEIGHT: 66 IN

## 2020-02-27 DIAGNOSIS — R80.9 PROTEINURIA, UNSPECIFIED TYPE: ICD-10-CM

## 2020-02-27 DIAGNOSIS — Z79.4 TYPE 2 DIABETES MELLITUS TREATED WITH INSULIN (HCC): ICD-10-CM

## 2020-02-27 DIAGNOSIS — E11.9 TYPE 2 DIABETES MELLITUS TREATED WITH INSULIN (HCC): ICD-10-CM

## 2020-02-27 DIAGNOSIS — Z12.5 SCREENING FOR PROSTATE CANCER: ICD-10-CM

## 2020-02-27 DIAGNOSIS — Z00.00 ANNUAL PHYSICAL EXAM: Primary | ICD-10-CM

## 2020-02-27 DIAGNOSIS — Z23 IMMUNIZATION DUE: ICD-10-CM

## 2020-02-27 LAB
ALBUMIN SERPL-MCNC: 4.7 G/DL (ref 3.5–5.2)
ALBUMIN/GLOB SERPL: 1.7 G/DL
ALP SERPL-CCNC: 89 U/L (ref 39–117)
ALT SERPL W P-5'-P-CCNC: 19 U/L (ref 1–41)
ANION GAP SERPL CALCULATED.3IONS-SCNC: 12.7 MMOL/L (ref 5–15)
AST SERPL-CCNC: 20 U/L (ref 1–40)
BASOPHILS # BLD AUTO: 0.07 10*3/MM3 (ref 0–0.2)
BASOPHILS NFR BLD AUTO: 0.8 % (ref 0–1.5)
BILIRUB BLD-MCNC: NEGATIVE MG/DL
BILIRUB SERPL-MCNC: 0.6 MG/DL (ref 0.2–1.2)
BUN BLD-MCNC: 14 MG/DL (ref 8–23)
BUN/CREAT SERPL: 11.7 (ref 7–25)
CALCIUM SPEC-SCNC: 10 MG/DL (ref 8.6–10.5)
CHLORIDE SERPL-SCNC: 103 MMOL/L (ref 98–107)
CHOLEST SERPL-MCNC: 128 MG/DL (ref 0–200)
CLARITY, POC: CLEAR
CO2 SERPL-SCNC: 25.3 MMOL/L (ref 22–29)
COLOR UR: YELLOW
CREAT BLD-MCNC: 1.2 MG/DL (ref 0.76–1.27)
DEPRECATED RDW RBC AUTO: 41.3 FL (ref 37–54)
EOSINOPHIL # BLD AUTO: 0.12 10*3/MM3 (ref 0–0.4)
EOSINOPHIL NFR BLD AUTO: 1.4 % (ref 0.3–6.2)
ERYTHROCYTE [DISTWIDTH] IN BLOOD BY AUTOMATED COUNT: 13.2 % (ref 12.3–15.4)
GFR SERPL CREATININE-BSD FRML MDRD: 61 ML/MIN/1.73
GLOBULIN UR ELPH-MCNC: 2.7 GM/DL
GLUCOSE BLD-MCNC: 106 MG/DL (ref 65–99)
GLUCOSE UR STRIP-MCNC: ABNORMAL MG/DL
HBA1C MFR BLD: 7.4 %
HCT VFR BLD AUTO: 50.2 % (ref 37.5–51)
HDLC SERPL-MCNC: 41 MG/DL (ref 40–60)
HGB BLD-MCNC: 17 G/DL (ref 13–17.7)
IMM GRANULOCYTES # BLD AUTO: 0.04 10*3/MM3 (ref 0–0.05)
IMM GRANULOCYTES NFR BLD AUTO: 0.5 % (ref 0–0.5)
KETONES UR QL: NEGATIVE
LDLC SERPL CALC-MCNC: 66 MG/DL (ref 0–100)
LDLC/HDLC SERPL: 1.61 {RATIO}
LEUKOCYTE EST, POC: NEGATIVE
LYMPHOCYTES # BLD AUTO: 1.39 10*3/MM3 (ref 0.7–3.1)
LYMPHOCYTES NFR BLD AUTO: 16 % (ref 19.6–45.3)
MCH RBC QN AUTO: 29 PG (ref 26.6–33)
MCHC RBC AUTO-ENTMCNC: 33.9 G/DL (ref 31.5–35.7)
MCV RBC AUTO: 85.5 FL (ref 79–97)
MONOCYTES # BLD AUTO: 0.81 10*3/MM3 (ref 0.1–0.9)
MONOCYTES NFR BLD AUTO: 9.3 % (ref 5–12)
NEUTROPHILS # BLD AUTO: 6.28 10*3/MM3 (ref 1.7–7)
NEUTROPHILS NFR BLD AUTO: 72 % (ref 42.7–76)
NITRITE UR-MCNC: NEGATIVE MG/ML
NRBC BLD AUTO-RTO: 0 /100 WBC (ref 0–0.2)
PH UR: 7 [PH] (ref 5–8)
PLATELET # BLD AUTO: 271 10*3/MM3 (ref 140–450)
PMV BLD AUTO: 10.9 FL (ref 6–12)
POC CREATININE URINE: NORMAL
POC MICROALBUMIN URINE: NORMAL
POTASSIUM BLD-SCNC: 4.3 MMOL/L (ref 3.5–5.2)
PROT SERPL-MCNC: 7.4 G/DL (ref 6–8.5)
PROT UR STRIP-MCNC: ABNORMAL MG/DL
PSA SERPL-MCNC: 1.46 NG/ML (ref 0–4)
RBC # BLD AUTO: 5.87 10*6/MM3 (ref 4.14–5.8)
RBC # UR STRIP: ABNORMAL /UL
SODIUM BLD-SCNC: 141 MMOL/L (ref 136–145)
SP GR UR: 1.02 (ref 1–1.03)
TRIGL SERPL-MCNC: 105 MG/DL (ref 0–150)
TSH SERPL DL<=0.05 MIU/L-ACNC: 1.1 UIU/ML (ref 0.27–4.2)
UROBILINOGEN UR QL: ABNORMAL
VLDLC SERPL-MCNC: 21 MG/DL (ref 5–40)
WBC NRBC COR # BLD: 8.71 10*3/MM3 (ref 3.4–10.8)

## 2020-02-27 PROCEDURE — 85025 COMPLETE CBC W/AUTO DIFF WBC: CPT | Performed by: FAMILY MEDICINE

## 2020-02-27 PROCEDURE — 99397 PER PM REEVAL EST PAT 65+ YR: CPT | Performed by: FAMILY MEDICINE

## 2020-02-27 PROCEDURE — 84443 ASSAY THYROID STIM HORMONE: CPT | Performed by: FAMILY MEDICINE

## 2020-02-27 PROCEDURE — 83036 HEMOGLOBIN GLYCOSYLATED A1C: CPT | Performed by: FAMILY MEDICINE

## 2020-02-27 PROCEDURE — 81003 URINALYSIS AUTO W/O SCOPE: CPT | Performed by: FAMILY MEDICINE

## 2020-02-27 PROCEDURE — 80053 COMPREHEN METABOLIC PANEL: CPT | Performed by: FAMILY MEDICINE

## 2020-02-27 PROCEDURE — G0103 PSA SCREENING: HCPCS | Performed by: FAMILY MEDICINE

## 2020-02-27 PROCEDURE — 82044 UR ALBUMIN SEMIQUANTITATIVE: CPT | Performed by: FAMILY MEDICINE

## 2020-02-27 PROCEDURE — 80061 LIPID PANEL: CPT | Performed by: FAMILY MEDICINE

## 2020-02-27 NOTE — PROGRESS NOTES
"Subjective   Jose D Bailey is a 64 y.o. male seen today for Annual Exam.     History of Present Illness   The patient is here for a physical exam.    States he is doing well.  Denies any chest pain or shortness of breath.    He is employed.  Does not smoke.  Denies alcohol or drug use.    Sees an eye doctor. Dr Corbett. Due for his last injection in the left eye on 03/11/2020.  Sees a dentist.  Sees Cardiology - Dr Melgar.  Sees Neurology - Jen OLIVAS.    Had a normal colonoscopy in 2010 with Dr Bautista. He is to call for a repeat next month.    Immunizations are up to date.      The following portions of the patient's history were reviewed and updated as appropriate: allergies, current medications, past social history and problem list.    Review of Systems   Constitutional: Negative.    HENT: Negative.    Eyes: Positive for visual disturbance. Negative for photophobia, pain, discharge, redness and itching.   Respiratory: Negative.    Cardiovascular: Negative.    Gastrointestinal: Negative.    Endocrine: Positive for cold intolerance (right sided.) and heat intolerance (left sided). Negative for polydipsia, polyphagia and polyuria.   Genitourinary: Negative.    Musculoskeletal: Positive for myalgias (with neuropathy). Negative for arthralgias, back pain, gait problem, joint swelling, neck pain and neck stiffness.   Skin: Negative.    Allergic/Immunologic: Positive for environmental allergies (seasonal). Negative for food allergies and immunocompromised state.   Neurological: Positive for numbness (and tingling of the right side. Face,arm,leg, and flank). Negative for dizziness, tremors, seizures, syncope, facial asymmetry, speech difficulty, weakness, light-headedness and headaches.   Hematological: Negative.    Psychiatric/Behavioral: Negative.        Objective   /64   Pulse 80   Temp 97.9 °F (36.6 °C)   Resp 16   Ht 167.6 cm (66\")   Wt 84.4 kg (186 lb)   SpO2 98%   BMI 30.02 kg/m²   Physical " Exam   Constitutional: He is oriented to person, place, and time. He appears well-developed and well-nourished. No distress.   HENT:   Head: Normocephalic and atraumatic.   Right Ear: External ear normal.   Left Ear: External ear normal.   Nose: Nose normal.   Mouth/Throat: Oropharynx is clear and moist.   Eyes: Pupils are equal, round, and reactive to light. Conjunctivae and EOM are normal.   Neck: Normal range of motion. Neck supple. No thyromegaly present.   Cardiovascular: Normal rate, regular rhythm, normal heart sounds and intact distal pulses.   Pulmonary/Chest: Effort normal and breath sounds normal. No respiratory distress. He has no wheezes. He has no rales.   Abdominal: Soft. Bowel sounds are normal. There is no tenderness.   Musculoskeletal: Normal range of motion.   Neurological: He is alert and oriented to person, place, and time. He has normal reflexes.   Skin: Skin is warm and dry. He is not diaphoretic.   Psychiatric: He has a normal mood and affect. His behavior is normal. Judgment and thought content normal.   Nursing note and vitals reviewed.      Assessment/Plan   Problem List Items Addressed This Visit     None      Visit Diagnoses     Annual physical exam    -  Primary    Relevant Orders    POCT urinalysis dipstick, automated (Completed)    POCT microalbumin (Completed)    Type 2 diabetes mellitus treated with insulin (CMS/[facility])        Relevant Orders    POC Glycosylated Hemoglobin (Hb A1C) (Completed)    POCT microalbumin (Completed)    Proteinuria, unspecified type        Screening for prostate cancer        Immunization due                  Drink plenty fluids.    Continue medications as doing.    Check a UA,CBC,CMP,Lipids,PSA, and TSH. Report results by letter.    With regards to lab results, patient is instructed to call the office if they have not received test results within 2 weeks time.    Refer to Nephrology. Dr Galloway and associates.    Follow up in 3 months. Sooner if needed.  One  year for a physical.      We discussed with the patient the importance of maintaining a healthy weight by observing a diet that is low in carbohydrates.  We also recommended avoiding consumption of high levels of sodium and caffeine to prevent hypertension. We recommended daily exercise and obtaining a weight with a BMI less than 26.  We also recommended avoiding the use of tobacco and alcohol.  We also recommended an annual physical with their primary care physician.          Scribed for Dr Clinton Day by Kirstin Mackenzie CMA.          I, Clinton Day MD, personally performed the services described in this documentation, as scribed by Kirstin Mackenzie in my presence, and is both accurate and complete.        (Please note that portions of this note were completed with a voice recognition program. Efforts were made to edit the dictations,but occasionally words are mis transcribed.)

## 2020-04-15 ENCOUNTER — TELEPHONE (OUTPATIENT)
Dept: FAMILY MEDICINE CLINIC | Facility: CLINIC | Age: 65
End: 2020-04-15

## 2020-04-15 DIAGNOSIS — E11.649 UNCONTROLLED TYPE 2 DIABETES MELLITUS WITH HYPOGLYCEMIA WITHOUT COMA (HCC): Primary | ICD-10-CM

## 2020-04-15 NOTE — TELEPHONE ENCOUNTER
PT CALLED AND SAID THAT [employer] IS REQUIRING A PA FOR THE NEEDLES THAT GOES ON THE END OF THE PEN. HE USES ISMAEL VENEGAS

## 2020-05-11 ENCOUNTER — TRANSCRIBE ORDERS (OUTPATIENT)
Dept: LAB | Facility: HOSPITAL | Age: 65
End: 2020-05-11

## 2020-05-11 ENCOUNTER — LAB (OUTPATIENT)
Dept: LAB | Facility: HOSPITAL | Age: 65
End: 2020-05-11

## 2020-05-11 DIAGNOSIS — N28.9 URETERAL SLUDGE: Primary | ICD-10-CM

## 2020-05-11 DIAGNOSIS — N28.9 ABNORMAL RENAL FUNCTION: ICD-10-CM

## 2020-05-11 LAB
ALBUMIN SERPL-MCNC: 4.2 G/DL (ref 3.5–5.2)
ANION GAP SERPL CALCULATED.3IONS-SCNC: 10.7 MMOL/L (ref 5–15)
BACTERIA UR QL AUTO: NORMAL /HPF
BASOPHILS # BLD AUTO: 0.05 10*3/MM3 (ref 0–0.2)
BASOPHILS NFR BLD AUTO: 0.6 % (ref 0–1.5)
BILIRUB UR QL STRIP: NEGATIVE
BUN BLD-MCNC: 17 MG/DL (ref 8–23)
BUN/CREAT SERPL: 15.5 (ref 7–25)
CALCIUM SPEC-SCNC: 9.8 MG/DL (ref 8.6–10.5)
CHLORIDE SERPL-SCNC: 100 MMOL/L (ref 98–107)
CLARITY UR: CLEAR
CO2 SERPL-SCNC: 25.3 MMOL/L (ref 22–29)
COLOR UR: YELLOW
CREAT BLD-MCNC: 1.1 MG/DL (ref 0.76–1.27)
CREAT UR-MCNC: 53.2 MG/DL
DEPRECATED RDW RBC AUTO: 40.5 FL (ref 37–54)
EOSINOPHIL # BLD AUTO: 0.12 10*3/MM3 (ref 0–0.4)
EOSINOPHIL NFR BLD AUTO: 1.4 % (ref 0.3–6.2)
ERYTHROCYTE [DISTWIDTH] IN BLOOD BY AUTOMATED COUNT: 12.9 % (ref 12.3–15.4)
GFR SERPL CREATININE-BSD FRML MDRD: 67 ML/MIN/1.73
GLUCOSE BLD-MCNC: 190 MG/DL (ref 65–99)
GLUCOSE UR STRIP-MCNC: ABNORMAL MG/DL
HCT VFR BLD AUTO: 51.2 % (ref 37.5–51)
HGB BLD-MCNC: 17.2 G/DL (ref 13–17.7)
HGB UR QL STRIP.AUTO: ABNORMAL
HYALINE CASTS UR QL AUTO: NORMAL /LPF
IMM GRANULOCYTES # BLD AUTO: 0.03 10*3/MM3 (ref 0–0.05)
IMM GRANULOCYTES NFR BLD AUTO: 0.4 % (ref 0–0.5)
KETONES UR QL STRIP: NEGATIVE
LEUKOCYTE ESTERASE UR QL STRIP.AUTO: NEGATIVE
LYMPHOCYTES # BLD AUTO: 1.5 10*3/MM3 (ref 0.7–3.1)
LYMPHOCYTES NFR BLD AUTO: 17.5 % (ref 19.6–45.3)
MCH RBC QN AUTO: 29 PG (ref 26.6–33)
MCHC RBC AUTO-ENTMCNC: 33.6 G/DL (ref 31.5–35.7)
MCV RBC AUTO: 86.3 FL (ref 79–97)
MONOCYTES # BLD AUTO: 0.82 10*3/MM3 (ref 0.1–0.9)
MONOCYTES NFR BLD AUTO: 9.6 % (ref 5–12)
NEUTROPHILS # BLD AUTO: 6.04 10*3/MM3 (ref 1.7–7)
NEUTROPHILS NFR BLD AUTO: 70.5 % (ref 42.7–76)
NITRITE UR QL STRIP: NEGATIVE
NRBC BLD AUTO-RTO: 0 /100 WBC (ref 0–0.2)
PH UR STRIP.AUTO: <=5 [PH] (ref 5–8)
PHOSPHATE SERPL-MCNC: 3.2 MG/DL (ref 2.5–4.5)
PLATELET # BLD AUTO: 258 10*3/MM3 (ref 140–450)
PMV BLD AUTO: 11.2 FL (ref 6–12)
POTASSIUM BLD-SCNC: 5.1 MMOL/L (ref 3.5–5.2)
PROT UR QL STRIP: ABNORMAL
PROT UR-MCNC: 23 MG/DL
RBC # BLD AUTO: 5.93 10*6/MM3 (ref 4.14–5.8)
RBC # UR: NORMAL /HPF
REF LAB TEST METHOD: NORMAL
SODIUM BLD-SCNC: 136 MMOL/L (ref 136–145)
SP GR UR STRIP: >=1.03 (ref 1–1.03)
SQUAMOUS #/AREA URNS HPF: NORMAL /HPF
UROBILINOGEN UR QL STRIP: ABNORMAL
WBC NRBC COR # BLD: 8.56 10*3/MM3 (ref 3.4–10.8)
WBC UR QL AUTO: NORMAL /HPF

## 2020-05-11 PROCEDURE — 82570 ASSAY OF URINE CREATININE: CPT

## 2020-05-11 PROCEDURE — 80069 RENAL FUNCTION PANEL: CPT

## 2020-05-11 PROCEDURE — 81001 URINALYSIS AUTO W/SCOPE: CPT

## 2020-05-11 PROCEDURE — 84156 ASSAY OF PROTEIN URINE: CPT

## 2020-05-11 PROCEDURE — 85025 COMPLETE CBC W/AUTO DIFF WBC: CPT

## 2020-05-11 PROCEDURE — 36415 COLL VENOUS BLD VENIPUNCTURE: CPT

## 2020-05-20 ENCOUNTER — OFFICE VISIT (OUTPATIENT)
Dept: NEUROLOGY | Facility: CLINIC | Age: 65
End: 2020-05-20

## 2020-05-20 VITALS
OXYGEN SATURATION: 98 % | SYSTOLIC BLOOD PRESSURE: 128 MMHG | HEART RATE: 65 BPM | TEMPERATURE: 97.4 F | DIASTOLIC BLOOD PRESSURE: 78 MMHG | WEIGHT: 189 LBS | HEIGHT: 66 IN | BODY MASS INDEX: 30.37 KG/M2

## 2020-05-20 DIAGNOSIS — E11.42 DIABETIC PERIPHERAL NEUROPATHY (HCC): ICD-10-CM

## 2020-05-20 DIAGNOSIS — I69.90 LATE EFFECTS OF CVA (CEREBROVASCULAR ACCIDENT): ICD-10-CM

## 2020-05-20 DIAGNOSIS — G83.21 MONOPARESIS OF UPPER EXTREMITY AFFECTING RIGHT DOMINANT SIDE (HCC): Primary | ICD-10-CM

## 2020-05-20 PROCEDURE — 99213 OFFICE O/P EST LOW 20 MIN: CPT | Performed by: NURSE PRACTITIONER

## 2020-05-20 RX ORDER — GABAPENTIN 600 MG/1
TABLET ORAL
Qty: 75 TABLET | Refills: 5 | Status: SHIPPED | OUTPATIENT
Start: 2020-05-20 | End: 2020-08-04 | Stop reason: SDUPTHER

## 2020-05-20 RX ORDER — INSULIN GLARGINE 100 [IU]/ML
70 INJECTION, SOLUTION SUBCUTANEOUS DAILY
COMMUNITY
Start: 2020-05-01 | End: 2020-05-28 | Stop reason: SDUPTHER

## 2020-05-20 NOTE — PROGRESS NOTES
"Subjective:     Patient ID: Jose D Bailey is a 65 y.o. male.    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 Chronic altered sensation in temperatures and textures on right side since CVA, especially with softer objects. Hard objects are easier to hold. Currently well controlled on gabapentin 600mg 1 in AM, 1/2 at noon and 1 in PM. At times he feels like \"a tourniquet is on his right arm, right thigh to knee\", but he has gotten used to this. This has not changed. Overall he feels like he is doing well with no new neurological concerns.    He had EMG/NCVS 6/7/18 completed on all 4 extremities showing right median neuropathy at wrist but was otherwise normal. 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 He does continue to work on [employer] with an "[employer] in [place], [place].  He has had no recurrent stroke symptoms. History of left subthalamic lacunar stroke in November 2016 with normal MRI Brain. On aspirin and Crestor for 2nd stroke prevention. Most recent hgba1c 7.4% on 2/2020 with PCP. Having injections in his eyes with Dr. Salinas for Diabetic Retinopathy.     The following portions of the patient's history were reviewed and updated as appropriate: allergies, current medications, past family history, past medical history, past social history, past surgical history and problem list.    Past Medical History:   Diagnosis Date   • Acute sinusitis    • Acute upper respiratory infection    • Cataract     Cataract extraction (OD, 2009;  OS, 2011).    • Chest pain    • Decreased ROM of left shoulder    • Diabetes mellitus (CMS/HCC)    • Diabetic " retinopathy (CMS/[facility])    • Diabetic retinopathy associated with controlled type 2 diabetes mellitus (CMS/[facility]) 2020    Dr. Salinas giving shots in eyes    • Dyslipidemia     Remained on statin   • ED (erectile dysfunction)    • H/O cardiovascular stress test     2012   • Herpes zoster    • Hyperlipidemia    • Hypertension    • Insulin dependent type 2 diabetes mellitus (CMS/[facility])     Type 2 insulin dependent diabetes (insulin dependent since 2009);  diagnosed approximately 1998.    • Junctional nevus of right forearm    • Nevus, atypical     Right arm   • Overweight     Mild overweight status, BMI 26.7.     • Plantar fasciitis     Bilateral plantar fasciitis;  utilizing orthotics.    • Quadriceps muscle strain    • Vasovagal syncope        Past Surgical History:   Procedure Laterality Date   • CATARACT EXTRACTION      OS   • COLONOSCOPY      10/11/2010       Social History     Socioeconomic History   • Marital status:      Spouse name: Not on file   • Number of children: Not on file   • Years of education: Not on file   • Highest education level: Not on file   Tobacco Use   • Smoking status: Never Smoker   • Smokeless tobacco: Never Used   Substance and Sexual Activity   • Alcohol use: No   • Drug use: No   • Sexual activity: Defer     Partners: Female     Comment:        Family History   Problem Relation Age of Onset   • Stroke Mother    • Breast cancer Mother    • Cancer Father         lung    • Stroke Father    • Heart attack Father    • Other Father         CABG x6   • Coronary artery disease Father    • Diabetes Father    • Lung cancer Father    • Coronary artery disease Maternal Grandmother         Review of Systems   Constitutional: Negative for chills, fatigue, fever and unexpected weight change.   HENT: Negative for ear pain, hearing loss, nosebleeds, rhinorrhea and sore throat.    Eyes: Negative for photophobia, pain, discharge, itching and visual disturbance.   Respiratory: Negative for  "cough, chest tightness, shortness of breath and wheezing.    Cardiovascular: Negative for chest pain, palpitations and leg swelling.   Gastrointestinal: Negative for abdominal pain, blood in stool, constipation, diarrhea, nausea and vomiting.   Genitourinary: Negative for dysuria, frequency, hematuria and urgency.   Musculoskeletal: Negative for arthralgias, back pain, gait problem, joint swelling, myalgias, neck pain and neck stiffness.   Skin: Negative for rash and wound.   Allergic/Immunologic: Negative for environmental allergies and food allergies.   Neurological: Negative for dizziness, tremors, seizures, syncope, speech difficulty, weakness, light-headedness, numbness and headaches.   Hematological: Negative for adenopathy. Does not bruise/bleed easily.   Psychiatric/Behavioral: Negative for agitation, confusion, decreased concentration, hallucinations, sleep disturbance and suicidal ideas. The patient is not nervous/anxious.    All other systems reviewed and are negative.       Objective:  /78   Pulse 65   Temp 97.4 °F (36.3 °C)   Ht 167.6 cm (66\")   Wt 85.7 kg (189 lb)   SpO2 98%   BMI 30.51 kg/m²     Neurologic Exam  Mental Status   Oriented to person, place, and time.   Level of consciousness: alert     Cranial Nerves      CN II   Visual fields full to confrontation.      CN III, IV, VI   Pupils are equal, round, and reactive to light.  Extraocular motions are normal.      CN V   Right facial sensation deficit: forehead, cheeks and mandible  Left facial sensation deficit: none     CN VII   Facial expression full, symmetric. Minimal right facial droop-very subtle and chronic     CN VIII   CN VIII normal.      CN IX, X   CN IX normal.   CN X normal.      CN XI   CN XI normal.      CN XII   CN XII normal.      Motor Exam   Muscle bulk: normal  Overall muscle tone: normal     Strength   Strength 5/5 throughout.      Sensory Exam   Right arm light touch: decreased RUE.  Left arm light touch: " normal  Right leg light touch: normal  Left leg light touch: normal  Vibration normal.   Right arm pinprick: decreased RUE.  Left arm pinprick: normal  Right leg pinprick: normal  Left leg pinprick: normal       Decreased sensation Right facial, Right Arm, Right Flank, RLE.      Gait, Coordination, and Reflexes      Gait  Gait: normal     Coordination   Romberg: negative  Finger to nose coordination: normal  Heel to shin coordination: normal     Tremor   Resting tremor: absent  Intention tremor: absent  Action tremor: absent     Reflexes   Right brachioradialis: 2+  Left brachioradialis: 2+  Right biceps: 2+  Left biceps: 2+  Right triceps: 2+  Left triceps: 2+  Right patellar: 2+  Left patellar: 2+  Right achilles: 2+  Left achilles: 2+  Right : 2+  Left : 2+     Physical Exam  Constitutional: He is oriented to person, place, and time.   Neurological: He is oriented to person, place, and time. He has normal strength. He has a normal Finger-Nose-Finger Test, a normal Heel to Montejo Test and a normal Romberg Test. Gait normal.   Reflex Scores:       Tricep reflexes are 2+ on the right side and 2+ on the left side.       Bicep reflexes are 2+ on the right side and 2+ on the left side.       Brachioradialis reflexes are 2+ on the right side and 2+ on the left side.       Patellar reflexes are 2+ on the right side and 2+ on the left side.       Achilles reflexes are 2+ on the right side and 2+ on the left side.    Assessment/Plan:       Jose D was seen today for stroke and peripheral neuropathy.    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    Diabetic peripheral neuropathy (CMS/HCC)  -     gabapentin (NEURONTIN) 600 MG tablet; 1 tab in AM, 1/2 tab at noon, 1 tab in PM.    Late effects of CVA (cerebrovascular  accident)  Comments:  on Aspirin and Crestor to prevent recurrent CVA           Continue current meds. Continue with all specialists. F/U in 6 months or sooner if needed.  Reviewed medications, potential side effects and signs and symptoms to report. Discussed risk versus benefits of treatment plan with patient and/or family-including medications, labs and radiology that may be ordered. Addressed questions and concerns during visit. Patient and/or family verbalized understanding and agree with plan.    As part of this patient's treatment plan I am prescribing controlled substances. The patient has been made aware of appropriate use of such medications, including potential risk of somnolence, limited ability to drive and/or work safely, and potential for dependence or overdose. It has also been made clear that these medications are for use by the patient only, without concomitant use of alcohol or other substances unless prescribed. Keep out of reach of children.  Shoaib report has been reviewed. If this is going to be prescribed long term, [facility] Controlled Substance Agreement Contract has also been read and signed by patient and myself.  Shoaib #[id number] reviewed.    During this visit the following were done:  Labs Reviewed [x] labs were recently collected by primary care provider on 5/11/2020 and renal function panel shows glucose of 190, BUN 17, creatinine 1.10 and otherwise normal CBC with differential looks essentially within normal limits. Recently saw Nephrology to check kidneys and patient reports they are ok overall.  Labs Ordered []    Radiology Reports Reviewed []    Radiology Ordered []    PCP Records Reviewed []    Referring Provider Records Reviewed []    ER Records Reviewed []    Hospital Records Reviewed []    History Obtained From Family []    Radiology Images Reviewed []    Other Reviewed []    Records Requested []      Jen Espinoza, BRENDON  5/20/2020

## 2020-05-27 ENCOUNTER — OFFICE VISIT (OUTPATIENT)
Dept: FAMILY MEDICINE CLINIC | Facility: CLINIC | Age: 65
End: 2020-05-27

## 2020-05-27 VITALS
HEART RATE: 64 BPM | HEIGHT: 66 IN | SYSTOLIC BLOOD PRESSURE: 138 MMHG | TEMPERATURE: 97.3 F | BODY MASS INDEX: 28.28 KG/M2 | WEIGHT: 176 LBS | DIASTOLIC BLOOD PRESSURE: 76 MMHG

## 2020-05-27 DIAGNOSIS — E78.00 PURE HYPERCHOLESTEROLEMIA: ICD-10-CM

## 2020-05-27 DIAGNOSIS — E11.649 UNCONTROLLED TYPE 2 DIABETES MELLITUS WITH HYPOGLYCEMIA WITHOUT COMA (HCC): Primary | ICD-10-CM

## 2020-05-27 DIAGNOSIS — Z23 IMMUNIZATION DUE: ICD-10-CM

## 2020-05-27 DIAGNOSIS — I10 ESSENTIAL HYPERTENSION: ICD-10-CM

## 2020-05-27 LAB
EXPIRATION DATE: ABNORMAL
HBA1C MFR BLD: 7.1 %
Lab: ABNORMAL

## 2020-05-27 PROCEDURE — 90471 IMMUNIZATION ADMIN: CPT | Performed by: FAMILY MEDICINE

## 2020-05-27 PROCEDURE — 99214 OFFICE O/P EST MOD 30 MIN: CPT | Performed by: FAMILY MEDICINE

## 2020-05-27 PROCEDURE — 83036 HEMOGLOBIN GLYCOSYLATED A1C: CPT | Performed by: FAMILY MEDICINE

## 2020-05-27 PROCEDURE — 90732 PPSV23 VACC 2 YRS+ SUBQ/IM: CPT | Performed by: FAMILY MEDICINE

## 2020-05-27 RX ORDER — PIOGLITAZONEHYDROCHLORIDE 30 MG/1
30 TABLET ORAL DAILY
Qty: 30 TABLET | Refills: 11 | Status: SHIPPED | OUTPATIENT
Start: 2020-05-27 | End: 2021-04-06

## 2020-05-27 RX ORDER — LISINOPRIL 20 MG/1
20 TABLET ORAL DAILY
Qty: 30 TABLET | Refills: 11 | Status: SHIPPED | OUTPATIENT
Start: 2020-05-27 | End: 2021-04-06 | Stop reason: SDUPTHER

## 2020-05-27 RX ORDER — NEBIVOLOL 5 MG/1
5 TABLET ORAL DAILY
Qty: 30 TABLET | Refills: 11 | Status: SHIPPED | OUTPATIENT
Start: 2020-05-27 | End: 2020-07-17

## 2020-05-27 RX ORDER — AMLODIPINE BESYLATE 2.5 MG/1
2.5 TABLET ORAL NIGHTLY
Qty: 30 TABLET | Refills: 11 | Status: SHIPPED | OUTPATIENT
Start: 2020-05-27 | End: 2020-07-17

## 2020-05-27 RX ORDER — ROSUVASTATIN CALCIUM 40 MG/1
40 TABLET, COATED ORAL DAILY
Qty: 30 TABLET | Refills: 11 | Status: SHIPPED | OUTPATIENT
Start: 2020-05-27 | End: 2021-04-06 | Stop reason: SDUPTHER

## 2020-05-27 NOTE — PROGRESS NOTES
"Subjective   Jose D Bailey is a 65 y.o. male seen today for Diabetes.     Diabetes   He presents for his follow-up diabetic visit. He has type 2 diabetes mellitus. There are no hypoglycemic associated symptoms. There are no diabetic associated symptoms. Pertinent negatives for diabetes include no chest pain, no foot paresthesias and no foot ulcerations. There are no hypoglycemic complications. Diabetic complications include peripheral neuropathy. Risk factors for coronary artery disease include diabetes mellitus, hypertension and male sex. Current diabetic treatment includes insulin injections and oral agent (triple therapy) (Lantus,Jardiance,Actos, and Metformin.). He is compliant with treatment all of the time. He participates in exercise intermittently. His home blood glucose trend is decreasing steadily (A1C today is down a bit at7.1%. 7.4% in February.). He does not see a podiatrist.Eye exam is current (Dr Corbett.).      Sees Nephrology and he recommended decreasing Lisinopril from 40 mg daily to 20 mg daily.    Saw Dr Espinoza on 05/20/2020.  To see Dr Melgar in July.      The following portions of the patient's history were reviewed and updated as appropriate: allergies, current medications, past social history and problem list.    Review of Systems   Constitutional: Negative for chills and fever.   Respiratory: Negative for shortness of breath.    Cardiovascular: Negative for chest pain.       Objective   /76   Pulse 64   Temp 97.3 °F (36.3 °C)   Ht 167.6 cm (66\")   Wt 79.8 kg (176 lb)   BMI 28.41 kg/m²   Physical Exam   Constitutional: He appears well-developed and well-nourished.   Cardiovascular: Normal rate and regular rhythm.   Pulmonary/Chest: Effort normal and breath sounds normal.   Nursing note and vitals reviewed.      Assessment/Plan   Problem List Items Addressed This Visit        Cardiovascular and Mediastinum    Hyperlipidemia    Hypertension       Endocrine    Uncontrolled type 2 " diabetes mellitus (CMS/[facility]) - Primary    Relevant Orders    POC Glycosylated Hemoglobin (Hb A1C) (Completed)      Other Visit Diagnoses     Immunization due          Hemoglobin A1c today is 7.1%.  This is decreased somewhat from 7.4% the last time.  His the review of systems is remarkable for having had his lisinopril dosage decreased by the nephrologist to 20 mg a day.  That was Dr. Morin.  His stroke symptoms have stabilized.  He continues to be on therapy for that.        Drink plenty fluids.    Decrease Lisinopril to 20 mg daily #30+11.    Continue other medications as doing.  Refills sent for #30 days +11 on Amlodipine,Jardiance,Lisinopril,Metformin,Bystolic,Actos, and Crestor.    Given a pneumococcal 23 vaccine today.    Follow up in 4 months. Sooner if needed.                Scribed for Dr Clinton Day by Kirstin Mackenzie CMA.          I, Clinton Day MD, personally performed the services described in this documentation, as scribed by Kirstin Mackenzie in my presence, and is both accurate and complete.        (Please note that portions of this note were completed with a voice recognition program. Efforts were made to edit the dictations,but occasionally words are mis transcribed.)

## 2020-05-28 ENCOUNTER — TELEPHONE (OUTPATIENT)
Dept: FAMILY MEDICINE CLINIC | Facility: CLINIC | Age: 65
End: 2020-05-28

## 2020-05-28 RX ORDER — INSULIN GLARGINE 100 [IU]/ML
70 INJECTION, SOLUTION SUBCUTANEOUS DAILY
Qty: 20 ML | Refills: 11 | Status: SHIPPED | OUTPATIENT
Start: 2020-05-28 | End: 2021-04-06 | Stop reason: SDUPTHER

## 2020-06-11 ENCOUNTER — TELEPHONE (OUTPATIENT)
Dept: FAMILY MEDICINE CLINIC | Facility: CLINIC | Age: 65
End: 2020-06-11

## 2020-06-11 RX ORDER — BLOOD SUGAR DIAGNOSTIC
STRIP MISCELLANEOUS
Qty: 100 EACH | Refills: 3 | Status: SHIPPED | OUTPATIENT
Start: 2020-06-11 | End: 2020-06-17 | Stop reason: SDUPTHER

## 2020-06-11 NOTE — TELEPHONE ENCOUNTER
"PT CALLED IN REQUESTING A REFILL ON HIS BD INSULIN SYRINGE U/F 31G X 5/16\" 1 PLEASE ADVISE      PT CB NUMBER: [phone]  [facility]: RUBI HERNANDEZ  [phone]  "

## 2020-06-17 RX ORDER — BLOOD SUGAR DIAGNOSTIC
STRIP MISCELLANEOUS
Qty: 100 EACH | Refills: 3 | Status: SHIPPED | OUTPATIENT
Start: 2020-06-17 | End: 2021-04-06 | Stop reason: ALTCHOICE

## 2020-07-17 ENCOUNTER — OFFICE VISIT (OUTPATIENT)
Dept: CARDIOLOGY | Facility: CLINIC | Age: 65
End: 2020-07-17

## 2020-07-17 VITALS
HEART RATE: 69 BPM | OXYGEN SATURATION: 98 % | SYSTOLIC BLOOD PRESSURE: 108 MMHG | WEIGHT: 180 LBS | TEMPERATURE: 96.8 F | HEIGHT: 66 IN | BODY MASS INDEX: 28.93 KG/M2 | DIASTOLIC BLOOD PRESSURE: 50 MMHG

## 2020-07-17 DIAGNOSIS — I10 ESSENTIAL HYPERTENSION: ICD-10-CM

## 2020-07-17 DIAGNOSIS — I11.9 HYPERTENSIVE HEART DISEASE WITHOUT HEART FAILURE: Primary | ICD-10-CM

## 2020-07-17 DIAGNOSIS — E78.00 PURE HYPERCHOLESTEROLEMIA: ICD-10-CM

## 2020-07-17 DIAGNOSIS — R55 VASOVAGAL SYNCOPE: ICD-10-CM

## 2020-07-17 DIAGNOSIS — E11.649 UNCONTROLLED TYPE 2 DIABETES MELLITUS WITH HYPOGLYCEMIA WITHOUT COMA (HCC): ICD-10-CM

## 2020-07-17 PROCEDURE — 99214 OFFICE O/P EST MOD 30 MIN: CPT | Performed by: INTERNAL MEDICINE

## 2020-07-17 RX ORDER — BISOPROLOL FUMARATE 5 MG/1
5 TABLET, FILM COATED ORAL NIGHTLY
Qty: 30 TABLET | Refills: 11 | Status: SHIPPED | OUTPATIENT
Start: 2020-07-17 | End: 2021-04-06

## 2020-08-04 DIAGNOSIS — G83.21 MONOPARESIS OF UPPER EXTREMITY AFFECTING RIGHT DOMINANT SIDE (HCC): ICD-10-CM

## 2020-08-04 DIAGNOSIS — E11.42 DIABETIC PERIPHERAL NEUROPATHY (HCC): ICD-10-CM

## 2020-08-05 RX ORDER — GABAPENTIN 600 MG/1
TABLET ORAL
Qty: 75 TABLET | Refills: 5 | Status: SHIPPED | OUTPATIENT
Start: 2020-08-05 | End: 2020-11-13

## 2020-09-30 ENCOUNTER — OFFICE VISIT (OUTPATIENT)
Dept: FAMILY MEDICINE CLINIC | Facility: CLINIC | Age: 65
End: 2020-09-30

## 2020-09-30 VITALS
OXYGEN SATURATION: 98 % | TEMPERATURE: 97.5 F | SYSTOLIC BLOOD PRESSURE: 120 MMHG | BODY MASS INDEX: 29.25 KG/M2 | HEART RATE: 95 BPM | DIASTOLIC BLOOD PRESSURE: 70 MMHG | HEIGHT: 66 IN | WEIGHT: 182 LBS | RESPIRATION RATE: 17 BRPM

## 2020-09-30 DIAGNOSIS — E11.649 UNCONTROLLED TYPE 2 DIABETES MELLITUS WITH HYPOGLYCEMIA WITHOUT COMA (HCC): Primary | ICD-10-CM

## 2020-09-30 LAB
EXPIRATION DATE: NORMAL
HBA1C MFR BLD: 5.7 %
Lab: NORMAL

## 2020-09-30 PROCEDURE — 83036 HEMOGLOBIN GLYCOSYLATED A1C: CPT | Performed by: FAMILY MEDICINE

## 2020-09-30 PROCEDURE — 99213 OFFICE O/P EST LOW 20 MIN: CPT | Performed by: FAMILY MEDICINE

## 2020-11-13 ENCOUNTER — OFFICE VISIT (OUTPATIENT)
Dept: NEUROLOGY | Facility: CLINIC | Age: 65
End: 2020-11-13

## 2020-11-13 VITALS
TEMPERATURE: 97.7 F | DIASTOLIC BLOOD PRESSURE: 70 MMHG | HEART RATE: 89 BPM | HEIGHT: 66 IN | SYSTOLIC BLOOD PRESSURE: 132 MMHG | OXYGEN SATURATION: 99 % | WEIGHT: 182 LBS | BODY MASS INDEX: 29.25 KG/M2

## 2020-11-13 DIAGNOSIS — G51.39 FACIAL SPASM: ICD-10-CM

## 2020-11-13 DIAGNOSIS — E11.42 DIABETIC PERIPHERAL NEUROPATHY (HCC): Primary | ICD-10-CM

## 2020-11-13 DIAGNOSIS — G83.21 MONOPARESIS OF UPPER EXTREMITY AFFECTING RIGHT DOMINANT SIDE (HCC): ICD-10-CM

## 2020-11-13 DIAGNOSIS — I69.90 LATE EFFECTS OF CVA (CEREBROVASCULAR ACCIDENT): ICD-10-CM

## 2020-11-13 DIAGNOSIS — R43.8 METALLIC TASTE: ICD-10-CM

## 2020-11-13 PROBLEM — R51.9 RIGHT FACIAL PAIN: Status: ACTIVE | Noted: 2020-11-13

## 2020-11-13 PROCEDURE — 99214 OFFICE O/P EST MOD 30 MIN: CPT | Performed by: NURSE PRACTITIONER

## 2020-11-13 RX ORDER — GABAPENTIN 600 MG/1
TABLET ORAL
Qty: 90 TABLET | Refills: 5 | Status: SHIPPED | OUTPATIENT
Start: 2020-11-13 | End: 2021-02-15

## 2020-11-13 NOTE — PROGRESS NOTES
"Subjective:     Patient ID: Jose D Bailey is a 65 y.o. male.    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 Chronic altered sensation in temperatures and textures on right side since CVA, especially with softer objects. Hard objects are easier to hold. Currently well controlled on gabapentin 600mg 1 in AM, 1/2 at noon and 1 in PM. At times he feels like \"a tourniquet is on his right arm, right thigh to knee\", but he has gotten used to this. This has not changed. He does intermittently have right facial \"drawing, pulling and tightness with burning sensation\" in right face which comes and goes, is intermittent, no real pattern except in the afternoons or evenings or when he is fatigued he notices this and he will try to lay down and sleep. He has noticed 2 episodes of metallic taste in his mouth that occurs randomly in the past 6 months. Very slight headache, but no migraine features, no speech changes, no other symptoms, but he tells me those were the symptoms he had prior to his stroke so it makes him a little concerned. It last a 1-2 minutes and goes away. He denies jerking, confusion, LOC or other symptoms. hgba1c 7.2% 5/27/20 and recent recheck with PCP 9/30/20 down to 5.7%. he continues to work. Feels like her could use an extra gabapentin dose PRN, but does not want to take every day.     He had EMG/NCVS 6/7/18 completed on all 4 extremities showing right median neuropathy at wrist but was otherwise normal. 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 He does continue to work on [employer] with an [employer] in [place], [place].  He has " had no recurrent stroke symptoms. History of left subthalamic lacunar stroke in November 2016 with normal MRI Brain. On aspirin and Crestor for 2nd stroke prevention. Most recent hgba1c 7.4% on 2/2020 with PCP. Having injections in his eyes with Dr. Salinas for Diabetic Retinopathy.     The following portions of the patient's history were reviewed and updated as appropriate: allergies, current medications, past family history, past medical history, past social history, past surgical history and problem list.    Past Medical History:   Diagnosis Date   • Acute sinusitis    • Acute upper respiratory infection    • Cataract     Cataract extraction (OD, 2009;  OS, 2011).    • Chest pain    • Decreased ROM of left shoulder    • Diabetes mellitus (CMS/HCC)    • Diabetic retinopathy (CMS/[facility])    • Diabetic retinopathy associated with controlled type 2 diabetes mellitus (CMS/HCC) 2020    Dr. Salinas giving shots in eyes    • Dyslipidemia     Remained on statin   • ED (erectile dysfunction)    • H/O cardiovascular stress test     2012   • Herpes zoster    • Hyperlipidemia    • Hypertension    • Insulin dependent type 2 diabetes mellitus (CMS/[facility])     Type 2 insulin dependent diabetes (insulin dependent since 2009);  diagnosed approximately 1998.    • Junctional nevus of right forearm    • Nevus, atypical     Right arm   • Overweight     Mild overweight status, BMI 26.7.     • Plantar fasciitis     Bilateral plantar fasciitis;  utilizing orthotics.    • Quadriceps muscle strain    • Vasovagal syncope        Past Surgical History:   Procedure Laterality Date   • CATARACT EXTRACTION      OS   • COLONOSCOPY      10/11/2010       Social History     Socioeconomic History   • Marital status:      Spouse name: Not on file   • Number of children: Not on file   • Years of education: Not on file   • Highest education level: Not on file   Tobacco Use   • Smoking status: Never Smoker   • Smokeless tobacco: Never Used  "  Substance and Sexual Activity   • Alcohol use: No   • Drug use: No   • Sexual activity: Defer     Partners: Female     Comment:        Family History   Problem Relation Age of Onset   • Stroke Mother    • Breast cancer Mother    • Cancer Father         lung    • Stroke Father    • Heart attack Father    • Other Father         CABG x6   • Coronary artery disease Father    • Diabetes Father    • Lung cancer Father    • Coronary artery disease Maternal Grandmother         Review of Systems   Constitutional: Negative for chills, fatigue, fever and unexpected weight change.   HENT: Negative for ear pain, hearing loss, nosebleeds, rhinorrhea and sore throat.    Eyes: Negative for photophobia, pain, discharge, itching and visual disturbance.   Respiratory: Negative for cough, chest tightness, shortness of breath and wheezing.    Cardiovascular: Negative for chest pain, palpitations and leg swelling.   Gastrointestinal: Negative for abdominal pain, blood in stool, constipation, diarrhea, nausea and vomiting.   Genitourinary: Negative for dysuria, frequency, hematuria and urgency.   Musculoskeletal: Negative for arthralgias, back pain, gait problem, joint swelling, myalgias, neck pain and neck stiffness.   Skin: Negative for rash and wound.   Allergic/Immunologic: Negative for environmental allergies and food allergies.   Neurological: Negative for dizziness, tremors, seizures, syncope, speech difficulty, weakness, light-headedness, numbness and headaches.   Hematological: Negative for adenopathy. Does not bruise/bleed easily.   Psychiatric/Behavioral: Negative for agitation, confusion, decreased concentration, hallucinations, sleep disturbance and suicidal ideas. The patient is not nervous/anxious.    All other systems reviewed and are negative.       Objective:  /70   Pulse 89   Temp 97.7 °F (36.5 °C)   Ht 167.6 cm (66\")   Wt 82.6 kg (182 lb)   SpO2 99%   BMI 29.38 kg/m²     Neurologic Exam  Mental " Status   Oriented to person, place, and time.   Level of consciousness: alert     Cranial Nerves      CN II   Visual fields full to confrontation.      CN III, IV, VI   Pupils are equal, round, and reactive to light.  Extraocular motions are normal.      CN V   Right facial sensation deficit: forehead, cheeks and mandible  Left facial sensation deficit: none     CN VII   Facial expression full, symmetric. Minimal right facial droop-very subtle and chronic, no twitching noted today, reports intermittent     CN VIII   CN VIII normal.      CN IX, X   CN IX normal.   CN X normal.      CN XI   CN XI normal.      CN XII   CN XII normal.      Motor Exam   Muscle bulk: normal  Overall muscle tone: normal     Strength   Strength 5/5 throughout.      Sensory Exam   Right arm light touch: decreased RUE.  Left arm light touch: normal  Right leg light touch: normal  Left leg light touch: normal  Vibration normal.   Right arm pinprick: decreased RUE.  Left arm pinprick: normal  Right leg pinprick: normal  Left leg pinprick: normal       Decreased sensation Right facial, Right Arm, Right Flank, RLE.      Gait, Coordination, and Reflexes      Gait  Gait: normal     Coordination   Romberg: negative  Finger to nose coordination: normal  Heel to shin coordination: normal     Tremor   Resting tremor: absent  Intention tremor: absent  Action tremor: absent     Reflexes   Right brachioradialis: 2+  Left brachioradialis: 2+  Right biceps: 2+  Left biceps: 2+  Right triceps: 2+  Left triceps: 2+  Right patellar: 2+  Left patellar: 2+  Right achilles: 2+  Left achilles: 2+  Right : 2+  Left : 2+     Physical Exam  Constitutional: He is oriented to person, place, and time.   Neurological: He is oriented to person, place, and time. He has normal strength. He has a normal Finger-Nose-Finger Test, a normal Heel to Montejo Test and a normal Romberg Test. Gait normal.   Reflex Scores:       Tricep reflexes are 2+ on the right side and 2+ on  the left side.       Bicep reflexes are 2+ on the right side and 2+ on the left side.       Brachioradialis reflexes are 2+ on the right side and 2+ on the left side.       Patellar reflexes are 2+ on the right side and 2+ on the left side.       Achilles reflexes are 2+ on the right side and 2+ on the left side.    Assessment/Plan:       Diagnoses and all orders for this visit:    1. Diabetic peripheral neuropathy (CMS/HCC) (Primary)  -     gabapentin (NEURONTIN) 600 MG tablet; 1 tab in AM, 1/2 tab at noon,1 tab before bed plus a 1/2 tab daily PRN facial pain/twitching  Dispense: 90 tablet; Refill: 5    2. 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 Facial spasm  Comments:  Right  Orders:  -     gabapentin (NEURONTIN) 600 MG tablet; 1 tab in AM, 1/2 tab at noon,1 tab before bed plus a 1/2 tab daily PRN facial pain/twitching  Dispense: 90 tablet; Refill: 5    4. Late effects of CVA (cerebrovascular accident)  Comments:  cont. aspirin and statin    5. Metallic taste  -     EEG Awake or Drowsy Routine; Future           We will add an additional as needed dose of gabapentin for the facial pain and twitching which is likely a type of spasm following stroke long-term.  Continue aspirin and statin.  With his metallic taste intermittently we will go ahead and get a baseline EEG.  Call for any worsening signs or symptoms.  Follow-up in 6 months or sooner if needed.  Reviewed medications, potential side effects and signs and symptoms to report. Discussed risk versus benefits of treatment plan with patient and/or family-including medications, labs and radiology that may be ordered. Addressed questions and concerns during visit. Patient and/or family verbalized understanding and agree with  plan.    AS THE PROVIDER, I PERSONALLY WORE PPE DURING ENTIRE FACE TO FACE ENCOUNTER IN CLINIC WITH THE PATIENT. PATIENT ALSO WORE PPE DURING ENTIRE FACE TO FACE ENCOUNTER EXCEPT FOR A MAX OF 30 SECONDS DURING NEUROLOGICAL EVALUATION OF CRANIAL NERVES AND THEN MASK WAS PLACED BACK OVER PATIENT FACE FOR REMAINDER OF VISIT. I WASHED MY HANDS BEFORE AND AFTER VISIT.    As part of this patient's treatment plan I am prescribing controlled substances. The patient has been made aware of appropriate use of such medications, including potential risk of somnolence, limited ability to drive and/or work safely, and potential for dependence or overdose. It has also been made clear that these medications are for use by the patient only, without concomitant use of alcohol or other substances unless prescribed. Keep out of reach of children.  Shoaib report has been reviewed. If this is going to be prescribed long term, [facility] [place] has also been read and signed by patient and myself.    During this visit the following were done:  Labs Reviewed [x]    Labs Ordered []    Radiology Reports Reviewed [x]    Radiology Ordered [x]    PCP Records Reviewed [x]    Referring Provider Records Reviewed []    ER Records Reviewed []    Hospital Records Reviewed []    History Obtained From Family []    Radiology Images Reviewed []    Other Reviewed []    Records Requested []      Jen Espinoza, BRENDON  11/13/2020

## 2020-11-20 ENCOUNTER — TELEPHONE (OUTPATIENT)
Dept: NEUROLOGY | Facility: CLINIC | Age: 65
End: 2020-11-20

## 2020-11-20 ENCOUNTER — HOSPITAL ENCOUNTER (OUTPATIENT)
Dept: NEUROLOGY | Facility: HOSPITAL | Age: 65
Discharge: HOME | End: 2020-11-20
Admitting: NURSE PRACTITIONER

## 2020-11-20 DIAGNOSIS — R43.8 METALLIC TASTE: ICD-10-CM

## 2020-11-20 PROCEDURE — 95819 EEG AWAKE AND ASLEEP: CPT

## 2020-11-20 NOTE — PROGRESS NOTES
Please notify patient his EEG is normal. No evidence of active seizure focus. We will f/u as scheduled. Thanks, BRENDON Serrano

## 2020-11-20 NOTE — TELEPHONE ENCOUNTER
----- Message from BRENDON Abrams sent at 11/20/2020  1:08 PM EST -----  Please notify patient his EEG is normal. No evidence of active seizure focus. We will f/u as scheduled. Thanks, BRENDON Serrano

## 2020-11-23 ENCOUNTER — TELEPHONE (OUTPATIENT)
Dept: NEUROLOGY | Facility: CLINIC | Age: 65
End: 2020-11-23

## 2020-12-09 NOTE — THERAPY TREATMENT NOTE
Outpatient Occupational Therapy Neuro Treatment Note  [facility]     Patient Name: Jose D Bailey  : 1955  MRN: [id number]  Today's Date: 2018       Visit Date: 2018    Patient Active Problem List   Diagnosis   • Atopic rhinitis   • Hyperlipidemia   • Hypertension   • Uncontrolled type 2 diabetes mellitus (CMS/HCC)   • ED (erectile dysfunction) of non-organic origin   • Weakness   • Overweight (BMI 25.0-29.9)   • thalamic lacunar stroke not seen on MRI   • Dyslipidemia   • Cataract   • Chest pain   • Decreased ROM of left shoulder   • Junctional nevus of right forearm   • Muscle pain   • Vasovagal syncope   • Left subthalamic lacunar stroke (CMS/HCC)   • Central pain syndrome   • Late effects of CVA (cerebrovascular accident)   • Neuralgia of flank   • Neuralgia of right upper extremity   • Hypertensive heart disease without heart failure   • Diabetic peripheral neuropathy (CMS/HCC)   • Carpal tunnel syndrome of right wrist   • 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    • Chest pain    • Decreased ROM of left shoulder    • Diabetes mellitus (CMS/HCC)    • Diabetic retinopathy (CMS/HCC)    • Dyslipidemia     Remained on statin   • ED (erectile dysfunction)    • H/O cardiovascular stress test        • Herpes zoster    • Hyperlipidemia    • Hypertension    • Insulin dependent type 2 diabetes mellitus (CMS/HCC)     Type 2 insulin dependent diabetes (insulin dependent since );  diagnosed approximately .    • Junctional nevus of right forearm    • Nevus, atypical     Right arm   • Overweight     Mild overweight status, BMI 26.7.     • Plantar fasciitis     Bilateral plantar fasciitis;  utilizing orthotics.    • Quadriceps muscle strain    • Vasovagal syncope         Past Surgical History:  Patient called and explained to patient that there is no availability for this year and he will get a call once schedules are out the next year.     Procedure Laterality Date   • CATARACT EXTRACTION      OS   • COLONOSCOPY      10/11/2010         Visit Dx:    ICD-10-CM ICD-9-CM   1. Decreased sensation of hand and upper extremity R20.8 782.0   2. Decreased range of motion of right shoulder M25.611 719.51   3. Decreased  strength of right hand R29.898 729.89                         OT Assessment/Plan     Row Name 09/17/18 1000          OT Assessment    Assessment Comments Pt demo good understanding of previous HEPs and new HEP and good understanding of ways to incorporte stretches while on vacation next week. He continues to demo ROM limitations and requires compensations throughout to complete ADL tasks.   -EM        OT Plan    OT Plan Comments Continue per POC  -EM       User Key  (r) = Recorded By, (t) = Taken By, (c) = Cosigned By    Initials Name Provider Type    Jennie Pizano, OTR Occupational Therapist              Therapy Education  Given: HEP  Program: New, Reinforced  How Provided: Verbal, Demonstration, Written  Provided to: Patient  Level of Understanding: Verbalized, Demonstrated              OT Exercises     Row Name 09/17/18 1000             Precautions    Existing Precautions/Restrictions no known precautions/restrictions  -EM         Subjective Comments    Subjective Comments Pt reports he is feeling better today  -EM         Subjective Pain    Able to rate subjective pain? yes  -EM      Pre-Treatment Pain Level 0  -EM      Post-Treatment Pain Level 0  -EM         Total Minutes    08812 - OT Therapeutic Exercise Minutes 45  -EM         Exercise 1    Exercise Name 1 Energy, Strength and ROM for ADL tasks  -EM      Equipment 1 UE Ergometer  -EM      Time (Minutes) 1 5 mins each direction at level 5  -EM         Exercise 2    Exercise Name 2 OT facilitated stretch and PROM of R shoulder while pt supine. Focusing on IR and flexion.  -EM         Exercise 3    Exercise Name 3 Pt was educated and completed IR stretches to add to his current  HEPs. See written HEP for details.   -EM        User Key  (r) = Recorded By, (t) = Taken By, (c) = Cosigned By    Initials Name Provider Type    Jennie Pizano OTR Occupational Therapist                        Time Calculation:   OT Start Time: 1000     Therapy Suggested Charges     Code   Minutes Charges    91677 (CPT®) Hc Ot Neuromusc Re Education Ea 15 Min      81206 (CPT®) Hc Ot Ther Proc Ea 15 Min 45 3    73313 (CPT®) Hc Ot Therapeutic Act Ea 15 Min      05870 (CPT®) Hc Ot Manual Therapy Ea 15 Min      37313 (CPT®) Hc Ot Iontophoresis Ea 15 Min      79784 (CPT®) Hc Ot Elec Stim Ea-Per 15 Min      81320 (CPT®) Hc Ot Ultrasound Ea 15 Min      71721 (CPT®) Hc Ot Self Care/Mgmt/Train Ea 15 Min      Total  45 3          Therapy Charges for Today     Code Description Service Date Service Provider Modifiers Qty    [id number] HC OT THER PROC EA 15 MIN 9/17/2018 Jennie Ghosh OTJADEN GO 3                    MICHAEL Canada  9/17/2018

## 2021-01-06 ENCOUNTER — OFFICE VISIT (OUTPATIENT)
Dept: FAMILY MEDICINE CLINIC | Facility: CLINIC | Age: 66
End: 2021-01-06

## 2021-01-06 VITALS
OXYGEN SATURATION: 99 % | WEIGHT: 174 LBS | BODY MASS INDEX: 27.97 KG/M2 | RESPIRATION RATE: 17 BRPM | HEART RATE: 70 BPM | TEMPERATURE: 97.5 F | DIASTOLIC BLOOD PRESSURE: 60 MMHG | SYSTOLIC BLOOD PRESSURE: 112 MMHG | HEIGHT: 66 IN

## 2021-01-06 DIAGNOSIS — E11.649 UNCONTROLLED TYPE 2 DIABETES MELLITUS WITH HYPOGLYCEMIA WITHOUT COMA (HCC): Primary | ICD-10-CM

## 2021-01-06 LAB
EXPIRATION DATE: NORMAL
HBA1C MFR BLD: 6.1 %
Lab: NORMAL

## 2021-01-06 PROCEDURE — 83036 HEMOGLOBIN GLYCOSYLATED A1C: CPT | Performed by: FAMILY MEDICINE

## 2021-01-06 PROCEDURE — 99213 OFFICE O/P EST LOW 20 MIN: CPT | Performed by: FAMILY MEDICINE

## 2021-01-06 NOTE — PROGRESS NOTES
"Subjective   Jose D Bailey is a 65 y.o. male seen today for Diabetes.     Diabetes  He presents for his follow-up diabetic visit. He has type 2 diabetes mellitus. There are no hypoglycemic associated symptoms. There are no diabetic associated symptoms. Pertinent negatives for diabetes include no chest pain. There are no hypoglycemic complications. Diabetic complications include peripheral neuropathy. Risk factors for coronary artery disease include diabetes mellitus. Current diabetic treatment includes insulin injections and oral agent (triple therapy) (Lantus, Jardiance, Metformin, and actos.). He is compliant with treatment all of the time. His weight is decreasing steadily. He is following a generally healthy diet. He participates in exercise intermittently. His home blood glucose trend is increasing steadily. An ACE inhibitor/angiotensin II receptor blocker is being taken. He does not see a podiatrist.Eye exam is current.      States he did see Neurology and had a normal EEG.  Still having the numbness in the right side of his body from his face and down to the right thigh. Also gets a metallic taste in his mouth occasionally. Right hand feels like sand paper all the time.      The following portions of the patient's history were reviewed and updated as appropriate: allergies, current medications, past social history and problem list.    Review of Systems   Constitutional: Negative for chills and fever.   Respiratory: Negative for cough and shortness of breath.    Cardiovascular: Negative for chest pain.   Neurological: Positive for numbness (right side of his body.). Negative for facial asymmetry.        Paresthesia and pain in the right side of his body. From his face down to the right thigh.       Objective   /60   Pulse 70   Temp 97.5 °F (36.4 °C)   Resp 17   Ht 167.6 cm (66\")   Wt 78.9 kg (174 lb)   SpO2 99%   BMI 28.08 kg/m²   Physical Exam  Vitals signs and nursing note reviewed. "   Constitutional:       Appearance: Normal appearance.   Neurological:      Mental Status: He is alert.         Assessment/Plan   Problems Addressed this Visit        Endocrine and Metabolic    Uncontrolled type 2 diabetes mellitus (CMS/HCC) - Primary    Relevant Orders    POC Glycosylated Hemoglobin (Hb A1C) (Completed)      Diagnoses       Codes Comments    Uncontrolled type 2 diabetes mellitus with hypoglycemia without coma (CMS/HCC)    -  Primary ICD-10-CM: E11.649  ICD-9-CM: 250.82, 251.2               Drink plenty fluids.  Continue to work on diet and weight loss.  Cut back on carbohydrates such as breads, potatoes, pastas and desserts.  Eat more  Fruits, vegetables, and lean meats such a fish and chicken.    Discontinue the Actos.    Continue other medications as doing.    Follow up in 3 months for a physical exam with Dr Albarado or Juan F. Sooner if needed.              Scribed for Dr Clinton Day by Kirstin Mackenzie CMA.          I, Clinton Day MD, personally performed the services described in this documentation, as scribed by Kirstin Mackenzie in my presence, and is both accurate and complete.        (Please note that portions of this note were completed with a voice recognition program. Efforts were made to edit the dictations,but occasionally words are mis transcribed.)

## 2021-02-13 DIAGNOSIS — E11.42 DIABETIC PERIPHERAL NEUROPATHY (HCC): ICD-10-CM

## 2021-02-13 DIAGNOSIS — G83.21 MONOPARESIS OF UPPER EXTREMITY AFFECTING RIGHT DOMINANT SIDE (HCC): ICD-10-CM

## 2021-02-13 DIAGNOSIS — G51.39 FACIAL SPASM: ICD-10-CM

## 2021-02-15 RX ORDER — GABAPENTIN 600 MG/1
TABLET ORAL
Qty: 75 TABLET | Refills: 0 | Status: SHIPPED | OUTPATIENT
Start: 2021-02-15 | End: 2021-03-18

## 2021-03-01 ENCOUNTER — TELEPHONE (OUTPATIENT)
Dept: NEUROLOGY | Facility: CLINIC | Age: 66
End: 2021-03-01

## 2021-03-01 DIAGNOSIS — G51.39 FACIAL SPASM: Primary | ICD-10-CM

## 2021-03-01 RX ORDER — BACLOFEN 10 MG/1
10 TABLET ORAL 3 TIMES DAILY PRN
Qty: 90 TABLET | Refills: 1 | Status: SHIPPED | OUTPATIENT
Start: 2021-03-01 | End: 2021-04-06

## 2021-03-01 NOTE — TELEPHONE ENCOUNTER
PATIENT:  [phone]      PT CALLED AND THE RIGHT SIDE OF HIS FACE IS JUMPING, INVOLUNTARY MOVEMENT, HE WANTED TO KNOW SHOULD HE MOVE APPT CLOSER AND CANCEL THE MAY ONE? PLEASE ADVISE I AM HAPPY TO DO WHICH EVER AND CONTACT THE PT MYSELF,     THANKS SO MUCH.

## 2021-03-01 NOTE — TELEPHONE ENCOUNTER
This sounds like facial spasms. I am going to send in a muscle relaxer to take as needed for the facial spasms. If we have a sooner appointment, please schedule-I believe April we have some openings. We may need to do a new brain scan. Thanks, Jen

## 2021-03-02 NOTE — TELEPHONE ENCOUNTER
Yes he can take this with gabapentin; the baclofen can be used to 3 times a day as needed for facial spasm.  He needs to be aware that the baclofen can cause drowsiness so I recommend that he see how it makes him feel before he takes it and tries to drive etc.

## 2021-03-02 NOTE — TELEPHONE ENCOUNTER
Provider: BRENDON PATE  Caller: ESTHER QUIROZ  Relationship to Patient: SELF         Reason for call: PT IS CALLING ASKING IF HE NEED TO TAKE THE MUSCLE RELAXER WITH THE gabapentin (NEURONTIN) OR HOW DOES HE NEED TO TAKE IT?      PLEASE CALL HIM BACK -[phone]

## 2021-03-08 NOTE — TELEPHONE ENCOUNTER
"Caller: Jose D Bailey    Relationship: Self    Best call back number: [phone] (WORK NUMBER)    What medications are you currently taking:   Current Outpatient Medications on File Prior to Visit   Medication Sig Dispense Refill   • aspirin 81 MG tablet Take 81 mg by mouth Daily.     • BD INSULIN SYRINGE U/F 31G X 5/16\" 1 ML misc USE ONCE DAILY 10 each 2   • bisoprolol (ZEBeta) 5 MG tablet Take 1 tablet by mouth Every Night. 30 tablet 11   • Empagliflozin (Jardiance) 25 MG tablet Take 25 mg by mouth Daily. 30 tablet 11   • gabapentin (NEURONTIN) 600 MG tablet TAKE ONE TABLET BY MOUTH EVERY MORNING, TAKE 1/2 TABLET BY MOUTH DAILY AT NOON, AND TAKE ONE TABLET BY MOUTH EVERY EVENING 75 tablet 0   • glucose blood test strip Check glucose twice a day. 50 each 7   • glucose monitor monitoring kit Check glucose twice a day. 1 each 0   • Insulin Pen Needle 31G X 8 MM misc Use one daily 90 each 3   • Insulin Syringe-Needle U-100 (B-D INS SYR HALF-UNIT .3CC/31G) 31G X 5/16\" 0.3 ML misc Pt uses once daily 100 each 3   • Insulin Syringes, Disposable, U-100 1 ML misc Inject 70 Units under the skin into the appropriate area as directed Daily. 100 each 3   • LANTUS 100 UNIT/ML injection Inject 70 Units under the skin into the appropriate area as directed Daily. 20 mL 11   • lisinopril (PRINIVIL,ZESTRIL) 20 MG tablet Take 1 tablet by mouth Daily. for blood pressure 30 tablet 11   • metFORMIN (GLUCOPHAGE) 500 MG tablet Take 2 tablets by mouth Daily With Breakfast. 60 tablet 11   • pioglitazone (ACTOS) 30 MG tablet Take 1 tablet by mouth Daily. 30 tablet 11   • Ranibizumab 0.3 MG/0.05ML solution 0.3 mg by Intravitreal route Every 30 (Thirty) Days.     • rosuvastatin (CRESTOR) 40 MG tablet Take 1 tablet by mouth Daily. 30 tablet 11     No current facility-administered medications on file prior to visit.        When did you start taking these medications: 3-2-21    Which medication are you concerned about: baclofen (LIORESAL) 10 MG " tablet    Who prescribed you this medication: BRENDON PATE     What are your concerns: PT STATES HE STARTING BLOOD SUGAR PROBLEMS ON 3-3-21, PATIENT STATES HE TOOK ONE PILL ON 3-2-21 , THREE PILLS 3-3-21 AND ONE PILL 3-4-21, THEN QUIT. HE STATES NOW IS SYMPTOMS ARE STARTING TO COME BACK AS OF 3-6-21     How long have you been taking these medications: PT STATES HE ONLY TOOK FIVE PILLS TOTAL BEFORE HE QUIT D/T TO IT WAS THE ONLY CHANGE BEFORE THE PROBLEMS STARTED    How long have you had these concerns: 3-3-21

## 2021-03-18 DIAGNOSIS — E11.42 DIABETIC PERIPHERAL NEUROPATHY (HCC): ICD-10-CM

## 2021-03-18 DIAGNOSIS — G51.39 FACIAL SPASM: ICD-10-CM

## 2021-03-18 DIAGNOSIS — G83.21 MONOPARESIS OF UPPER EXTREMITY AFFECTING RIGHT DOMINANT SIDE (HCC): ICD-10-CM

## 2021-03-18 RX ORDER — GABAPENTIN 600 MG/1
TABLET ORAL
Qty: 75 TABLET | Refills: 3 | Status: SHIPPED | OUTPATIENT
Start: 2021-03-18 | End: 2021-05-13

## 2021-04-06 ENCOUNTER — OFFICE VISIT (OUTPATIENT)
Dept: FAMILY MEDICINE CLINIC | Facility: CLINIC | Age: 66
End: 2021-04-06

## 2021-04-06 VITALS
WEIGHT: 172.8 LBS | BODY MASS INDEX: 27.77 KG/M2 | TEMPERATURE: 97.8 F | HEART RATE: 60 BPM | HEIGHT: 66 IN | SYSTOLIC BLOOD PRESSURE: 128 MMHG | OXYGEN SATURATION: 97 % | RESPIRATION RATE: 16 BRPM | DIASTOLIC BLOOD PRESSURE: 68 MMHG

## 2021-04-06 DIAGNOSIS — E11.649 UNCONTROLLED TYPE 2 DIABETES MELLITUS WITH HYPOGLYCEMIA WITHOUT COMA (HCC): ICD-10-CM

## 2021-04-06 DIAGNOSIS — Z12.12 ENCOUNTER FOR COLORECTAL CANCER SCREENING: ICD-10-CM

## 2021-04-06 DIAGNOSIS — Z12.5 PROSTATE CANCER SCREENING: ICD-10-CM

## 2021-04-06 DIAGNOSIS — E78.00 PURE HYPERCHOLESTEROLEMIA: ICD-10-CM

## 2021-04-06 DIAGNOSIS — Z00.00 ANNUAL PHYSICAL EXAM: Primary | ICD-10-CM

## 2021-04-06 DIAGNOSIS — I10 ESSENTIAL HYPERTENSION: ICD-10-CM

## 2021-04-06 DIAGNOSIS — Z12.11 ENCOUNTER FOR COLORECTAL CANCER SCREENING: ICD-10-CM

## 2021-04-06 LAB
ALBUMIN SERPL-MCNC: 4.5 G/DL (ref 3.5–5.2)
ALBUMIN UR-MCNC: 29.4 MG/DL
ALBUMIN/GLOB SERPL: 1.6 G/DL
ALP SERPL-CCNC: 94 U/L (ref 39–117)
ALT SERPL W P-5'-P-CCNC: 20 U/L (ref 1–41)
ANION GAP SERPL CALCULATED.3IONS-SCNC: 10.4 MMOL/L (ref 5–15)
AST SERPL-CCNC: 19 U/L (ref 1–40)
BILIRUB SERPL-MCNC: 0.4 MG/DL (ref 0–1.2)
BUN SERPL-MCNC: 10 MG/DL (ref 8–23)
BUN/CREAT SERPL: 8.2 (ref 7–25)
CALCIUM SPEC-SCNC: 9.4 MG/DL (ref 8.6–10.5)
CHLORIDE SERPL-SCNC: 109 MMOL/L (ref 98–107)
CHOLEST SERPL-MCNC: 126 MG/DL (ref 0–200)
CO2 SERPL-SCNC: 24.6 MMOL/L (ref 22–29)
CREAT SERPL-MCNC: 1.22 MG/DL (ref 0.76–1.27)
GFR SERPL CREATININE-BSD FRML MDRD: 59 ML/MIN/1.73
GLOBULIN UR ELPH-MCNC: 2.8 GM/DL
GLUCOSE SERPL-MCNC: 149 MG/DL (ref 65–99)
HDLC SERPL-MCNC: 42 MG/DL (ref 40–60)
LDLC SERPL CALC-MCNC: 69 MG/DL (ref 0–100)
LDLC/HDLC SERPL: 1.65 {RATIO}
POTASSIUM SERPL-SCNC: 4.6 MMOL/L (ref 3.5–5.2)
PROT SERPL-MCNC: 7.3 G/DL (ref 6–8.5)
PSA SERPL-MCNC: 1.64 NG/ML (ref 0–4)
SODIUM SERPL-SCNC: 144 MMOL/L (ref 136–145)
TRIGL SERPL-MCNC: 74 MG/DL (ref 0–150)
VLDLC SERPL-MCNC: 15 MG/DL (ref 5–40)

## 2021-04-06 PROCEDURE — 99397 PER PM REEVAL EST PAT 65+ YR: CPT | Performed by: FAMILY MEDICINE

## 2021-04-06 PROCEDURE — 80061 LIPID PANEL: CPT | Performed by: FAMILY MEDICINE

## 2021-04-06 PROCEDURE — 82043 UR ALBUMIN QUANTITATIVE: CPT | Performed by: FAMILY MEDICINE

## 2021-04-06 PROCEDURE — 99214 OFFICE O/P EST MOD 30 MIN: CPT | Performed by: FAMILY MEDICINE

## 2021-04-06 PROCEDURE — 80053 COMPREHEN METABOLIC PANEL: CPT | Performed by: FAMILY MEDICINE

## 2021-04-06 PROCEDURE — G0103 PSA SCREENING: HCPCS | Performed by: FAMILY MEDICINE

## 2021-04-06 RX ORDER — INSULIN GLARGINE 100 [IU]/ML
70 INJECTION, SOLUTION SUBCUTANEOUS DAILY
Qty: 20 ML | Refills: 11 | Status: SHIPPED | OUTPATIENT
Start: 2021-04-06 | End: 2021-10-19 | Stop reason: SDUPTHER

## 2021-04-06 RX ORDER — NEBIVOLOL 5 MG/1
5 TABLET ORAL DAILY
Qty: 30 TABLET
Start: 2021-04-06 | End: 2021-08-17 | Stop reason: ALTCHOICE

## 2021-04-06 RX ORDER — EMPAGLIFLOZIN 25 MG/1
25 TABLET, FILM COATED ORAL DAILY
Qty: 30 TABLET | Refills: 11 | Status: SHIPPED | OUTPATIENT
Start: 2021-04-06 | End: 2022-03-16 | Stop reason: SDUPTHER

## 2021-04-06 RX ORDER — LISINOPRIL 20 MG/1
20 TABLET ORAL DAILY
Qty: 30 TABLET | Refills: 11 | Status: SHIPPED | OUTPATIENT
Start: 2021-04-06 | End: 2021-07-23

## 2021-04-06 RX ORDER — ROSUVASTATIN CALCIUM 40 MG/1
40 TABLET, COATED ORAL DAILY
Qty: 30 TABLET | Refills: 11 | Status: SHIPPED | OUTPATIENT
Start: 2021-04-06 | End: 2022-03-16 | Stop reason: SDUPTHER

## 2021-04-06 NOTE — PATIENT INSTRUCTIONS
Advance Care Planning and Advance Directives     You make decisions on a daily basis - decisions about where you want to live, your career, your home, your life. Perhaps one of the most important decisions you face is your choice for future medical care. Take time to talk with your family and your healthcare team and start planning today.  Advance Care Planning is a process that can help you:  · Understand possible future healthcare decisions in light of your own experiences  · Reflect on those decision in light of your goals and values  · Discuss your decisions with those closest to you and the healthcare professionals that care for you  · Make a plan by creating a document that reflects your wishes    Surrogate Decision Maker  In the event of a medical emergency, which has left you unable to communicate or to make your own decisions, you would need someone to make decisions for you.  It is important to discuss your preferences for medical treatment with this person while you are in good health.     Qualities of a surrogate decision maker:  • Willing to take on this role and responsibility  • Knows what you want for future medical care  • Willing to follow your wishes even if they don't agree with them  • Able to make difficult medical decisions under stressful circumstances    Advance Directives  These are legal documents you can create that will guide your healthcare team and decision maker(s) when needed. These documents can be stored in the electronic medical record.    · Living Will - a legal document to guide your care if you have a terminal condition or a serious illness and are unable to communicate. States vary by statute in document names/types, but most forms may include one or more of the following:        -  Directions regarding life-prolonging treatments        -  Directions regarding artificially provided nutrition/hydration        -  Choosing a healthcare decision maker        -  Direction  regarding organ/tissue donation    · Durable Power of  for Healthcare - this document names an -in-fact to make medical decisions for you, but it may also allow this person to make personal and financial decisions for you. Please seek the advice of an  if you need this type of document.    **Advance Directives are not required and no one may discriminate against you if you do not sign one.    Medical Orders  Many states allow specific forms/orders signed by your physician to record your wishes for medical treatment in your current state of health. This form, signed in personal communication with your physician, addresses resuscitation and other medical interventions that you may or may not want.      For more information or to schedule a time with a [facility] Advance Care Planning Facilitator contact: [email or web address] or call [phone] and someone will contact you directly.

## 2021-04-06 NOTE — PROGRESS NOTES
Follow Up Office Visit      Patient Name: Jose D Bailey  : 1955   MRN: [id number]     Chief Complaint:    Chief Complaint   Patient presents with   • Annual Exam       History of Present Illness: Jose D Bailey is a 66 y.o. male who is here today to follow up with controlled diabetes mellitus and neuropathy.  Patient's hypertension and hyperlipidemia are both controlled.  Patient's diabetes is currently worsening but still controlled.  Patient stop Actos and decrease his insulin recently.    Diabetes: patient denies any complaints of side effects from diabetes medications. Patient states last time his sugar was low or felt symptomatic was in 2019. Patient states he takes his lantus in the morning. Patient states he does not check his FSBS routinely even though he should. Patient states that he eats well balanced diet.     Neuropathy: patient has right sided neuropathy from previous stroke. Patient takes gabapentin that helps with neuropathy pain. Patient denies any complaints today.    Annual Physical: Patient due for colonoscopy and agreeable to that. Patient states he did not get COVID vaccines due to having severe reaction to influenza vaccine almost 20 years ago. Patient has not had zoster vaccine.  We discussed diet and exercise recommendations today.  We also discussed colorectal cancer screening.      Review of systems: right sided burning pain, patient denies any difficulty with urination or bowel movements. Patient denies any chest pains or palpitations. Patient denies any swelling in legs or ankles.    Physical exam: mood and affect appropriate. Heart and lungs normal. No edema noted. Diabetic foot exam normal.  Patient had intact sensation on all 6 areas tested on bilateral feet.  Patient had palpable DP pulses bilateral.      Subjective        I have reviewed and the following portions of the patient's history were updated as appropriate: past family history, past medical history, past social  "history, past surgical history and problem list.    Medications:     Current Outpatient Medications:   •  aspirin 81 MG tablet, Take 81 mg by mouth Daily., Disp: , Rfl:   •  BD INSULIN SYRINGE U/F 31G X 5/16\" 1 ML misc, USE ONCE DAILY, Disp: 10 each, Rfl: 2  •  Empagliflozin (Jardiance) 25 MG tablet, Take 25 mg by mouth Daily., Disp: 30 tablet, Rfl: 11  •  gabapentin (NEURONTIN) 600 MG tablet, TAKE ONE TABLET BY MOUTH EVERY MORNING THEN TAKE 1/2 TABLET BY MOUTH DAILY AT NOON THEN TAKE ONE TABLET BY MOUTH EVERY EVENING, Disp: 75 tablet, Rfl: 3  •  glucose blood test strip, Check glucose twice a day., Disp: 50 each, Rfl: 7  •  glucose monitor monitoring kit, Check glucose twice a day., Disp: 1 each, Rfl: 0  •  Lantus 100 UNIT/ML injection, Inject 70 Units under the skin into the appropriate area as directed Daily., Disp: 20 mL, Rfl: 11  •  lisinopril (PRINIVIL,ZESTRIL) 20 MG tablet, Take 1 tablet by mouth Daily. for blood pressure, Disp: 30 tablet, Rfl: 11  •  rosuvastatin (CRESTOR) 40 MG tablet, Take 1 tablet by mouth Daily., Disp: 30 tablet, Rfl: 11  •  metFORMIN (Glucophage) 1000 MG tablet, Take 1 tablet by mouth 2 (Two) Times a Day With Meals., Disp: 60 tablet, Rfl: 5  •  nebivolol (Bystolic) 5 MG tablet, Take 1 tablet by mouth Daily., Disp: 30 tablet, Rfl:     Allergies:   Allergies   Allergen Reactions   • Sulfa Antibiotics Hives   • Atorvastatin Myalgia   • Pravachol [Pravastatin Sodium] Myalgia   • Pravastatin Myalgia   • Simvastatin Myalgia   • Pseudoephedrine Rash       Objective     Physical Exam: Please see HPI for physical exam  Vital Signs:   Vitals:    04/06/21 0842   BP: 128/68   Pulse: 60   Resp: 16   Temp: 97.8 °F (36.6 °C)   TempSrc: Temporal   SpO2: 97%   Weight: 78.4 kg (172 lb 12.8 oz)   Height: 167.6 cm (66\")   PainSc: 0-No pain     Body mass index is 27.89 kg/m².          Assessment / Plan      Assessment/Plan:   Diagnoses and all orders for this visit:    1. Annual physical exam (Primary)  -   "   Comprehensive Metabolic Panel  -     MicroAlbumin, Urine, Random - Urine, Clean Catch  -     Lipid Panel    2. Uncontrolled type 2 diabetes mellitus with hypoglycemia without coma (CMS/HCC)  -     metFORMIN (Glucophage) 1000 MG tablet; Take 1 tablet by mouth 2 (Two) Times a Day With Meals.  Dispense: 60 tablet; Refill: 5  -     Lantus 100 UNIT/ML injection; Inject 70 Units under the skin into the appropriate area as directed Daily.  Dispense: 20 mL; Refill: 11  -     Empagliflozin (Jardiance) 25 MG tablet; Take 25 mg by mouth Daily.  Dispense: 30 tablet; Refill: 11    3. Essential hypertension  -     nebivolol (Bystolic) 5 MG tablet; Take 1 tablet by mouth Daily.  Dispense: 30 tablet  -     lisinopril (PRINIVIL,ZESTRIL) 20 MG tablet; Take 1 tablet by mouth Daily. for blood pressure  Dispense: 30 tablet; Refill: 11    4. Pure hypercholesterolemia  -     rosuvastatin (CRESTOR) 40 MG tablet; Take 1 tablet by mouth Daily.  Dispense: 30 tablet; Refill: 11    5. Prostate cancer screening  -     PSA SCREENING    6. Encounter for colorectal cancer screening  -     Ambulatory Referral For Screening Colonoscopy    Counseling: Counseled patient regarding colorectal cancer screening.  Also  patient in regards to the screening labs above.  We discussed diet and exercise today we also discussed advance care planning.    Patient's diabetes is recently worsened but still under control.  We will continue Lantus and Jardiance but increase Metformin to twice a day.  Patient will return in 3 months for repeat A1c.  If controlled at that point we can follow-up in 6 months.    Follow Up:   Return in about 3 months (around 7/6/2021).    Adalberto Albarado DO  [facility]       Please note that portions of this note may have been completed with a voice recognition program. Efforts were made to edit the dictations, but occasionally words are mistranscribed.

## 2021-05-13 ENCOUNTER — OFFICE VISIT (OUTPATIENT)
Dept: NEUROLOGY | Facility: CLINIC | Age: 66
End: 2021-05-13

## 2021-05-13 VITALS
HEART RATE: 65 BPM | DIASTOLIC BLOOD PRESSURE: 64 MMHG | WEIGHT: 172 LBS | OXYGEN SATURATION: 94 % | HEIGHT: 66 IN | BODY MASS INDEX: 27.64 KG/M2 | SYSTOLIC BLOOD PRESSURE: 116 MMHG

## 2021-05-13 DIAGNOSIS — E11.42 DIABETIC PERIPHERAL NEUROPATHY (HCC): Primary | ICD-10-CM

## 2021-05-13 DIAGNOSIS — I69.90 LATE EFFECTS OF CVA (CEREBROVASCULAR ACCIDENT): ICD-10-CM

## 2021-05-13 DIAGNOSIS — G51.39 FACIAL SPASM: ICD-10-CM

## 2021-05-13 DIAGNOSIS — G83.21 MONOPARESIS OF UPPER EXTREMITY AFFECTING RIGHT DOMINANT SIDE (HCC): ICD-10-CM

## 2021-05-13 PROCEDURE — 99213 OFFICE O/P EST LOW 20 MIN: CPT | Performed by: NURSE PRACTITIONER

## 2021-05-13 RX ORDER — GABAPENTIN 600 MG/1
TABLET ORAL
Qty: 75 TABLET | Refills: 5 | Status: SHIPPED | OUTPATIENT
Start: 2021-05-13 | End: 2021-11-04

## 2021-05-13 NOTE — PROGRESS NOTES
"Subjective:     Patient ID: Jose D Bailey is a 66 y.o. male.    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 Chronic altered sensation in temperatures and textures on right side since CVA, especially with softer objects. Hard objects are easier to hold. Currently well controlled on gabapentin 600mg 1 in AM, 1/2 at noon and 1 in PM. At times he feels like \"a tourniquet is on his right arm, right thigh to knee\", but he has gotten used to this. This has not changed. He needs refills on his gabapentin today.    His right facial spasms have resolved.  He had 2 episodes of metallic taste in his mouth and headaches but has had no more issues since last visit.  Typically takes gabapentin 600mg BID every day and then takes 1/2 tab at noon as needed.  He does continue to work on [employer] with an [employer] in [place], [place].   Hgba1c 6.1%, lipid panel normal w/ ldl <100 1/2021 with PCP.  He has chosen not to receive covid vaccine per discussion with prior PCP Dr. Day who just retired-had severe rash and reaction to flu vaccine 20 years ago. His brother had GBS from flu vaccine so he is very hesitant to be vaccinated.    Prior workup:  He had EMG/NCVS 6/7/18 completed on all 4 extremities showing right median neuropathy at wrist but was otherwise normal. 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  He has had no recurrent stroke symptoms. History of left subthalamic lacunar stroke in November 2016 with normal MRI Brain. On aspirin and Crestor for 2nd stroke prevention. Having injections in his eyes with Dr. Salinas for Diabetic Retinopathy.     The " following portions of the patient's history were reviewed and updated as appropriate: allergies, current medications, past family history, past medical history, past social history, past surgical history and problem list.    Past Medical History:   Diagnosis Date   • Acute sinusitis    • Acute upper respiratory infection    • Cataract     Cataract extraction (OD, 2009;  OS, 2011).    • Chest pain    • Decreased ROM of left shoulder    • Diabetes mellitus (CMS/[facility])    • Diabetic retinopathy (CMS/[facility])    • Diabetic retinopathy associated with controlled type 2 diabetes mellitus (CMS/[facility]) 2020    Dr. Salinas giving shots in eyes    • Dyslipidemia     Remained on statin   • ED (erectile dysfunction)    • H/O cardiovascular stress test     2012   • Herpes zoster    • Hyperlipidemia    • Hypertension    • Insulin dependent type 2 diabetes mellitus (CMS/[facility])     Type 2 insulin dependent diabetes (insulin dependent since 2009);  diagnosed approximately 1998.    • Junctional nevus of right forearm    • Nevus, atypical     Right arm   • Overweight     Mild overweight status, BMI 26.7.     • Plantar fasciitis     Bilateral plantar fasciitis;  utilizing orthotics.    • Quadriceps muscle strain    • Vasovagal syncope        Past Surgical History:   Procedure Laterality Date   • CATARACT EXTRACTION      OS   • COLONOSCOPY      10/11/2010       Social History     Socioeconomic History   • Marital status:      Spouse name: Not on file   • Number of children: Not on file   • Years of education: Not on file   • Highest education level: Not on file   Tobacco Use   • Smoking status: Never Smoker   • Smokeless tobacco: Never Used   Vaping Use   • Vaping Use: Never used   Substance and Sexual Activity   • Alcohol use: No   • Drug use: No   • Sexual activity: Defer     Partners: Female     Comment:        Family History   Problem Relation Age of Onset   • Stroke Mother    • Breast cancer Mother    • Cancer Father          "lung    • Stroke Father    • Heart attack Father    • Other Father         CABG x6   • Coronary artery disease Father    • Diabetes Father    • Lung cancer Father    • Coronary artery disease Maternal Grandmother         Review of Systems   Constitutional: Negative for chills, fatigue, fever and unexpected weight change.   HENT: Negative for ear pain, hearing loss, nosebleeds, rhinorrhea and sore throat.    Eyes: Negative for photophobia, pain, discharge, itching and visual disturbance.   Respiratory: Negative for cough, chest tightness, shortness of breath and wheezing.    Cardiovascular: Negative for chest pain, palpitations and leg swelling.   Gastrointestinal: Negative for abdominal pain, blood in stool, constipation, diarrhea, nausea and vomiting.   Genitourinary: Negative for dysuria, frequency, hematuria and urgency.   Musculoskeletal: Negative for arthralgias, back pain, gait problem, joint swelling, myalgias, neck pain and neck stiffness.   Skin: Negative for rash and wound.   Allergic/Immunologic: Negative for environmental allergies and food allergies.   Neurological: Negative for dizziness, tremors, seizures, syncope, speech difficulty, weakness, light-headedness, numbness and headaches.   Hematological: Negative for adenopathy. Does not bruise/bleed easily.   Psychiatric/Behavioral: Negative for agitation, confusion, decreased concentration, hallucinations, sleep disturbance and suicidal ideas. The patient is not nervous/anxious.         Objective:  /64   Pulse 65   Ht 167.6 cm (66\")   Wt 78 kg (172 lb)   SpO2 94%   BMI 27.76 kg/m²     Neurologic Exam  Mental Status   Oriented to person, place, and time.   Level of consciousness: alert     Cranial Nerves      CN II   Visual fields full to confrontation.      CN III, IV, VI   Pupils are equal, round, and reactive to light.  Extraocular motions are normal.      CN V   Right facial sensation deficit: forehead, cheeks and mandible  Left facial " sensation deficit: none     CN VII   Facial expression full, symmetric. Minimal right facial droop-very subtle and chronic     CN VIII   CN VIII normal.      CN IX, X   CN IX normal.   CN X normal.      CN XI   CN XI normal.      CN XII   CN XII normal.      Motor Exam   Muscle bulk: normal  Overall muscle tone: normal     Strength   Strength 5/5 throughout.      Sensory Exam   Right arm light touch: decreased RUE.  Left arm light touch: normal  Right leg light touch: normal  Left leg light touch: normal  Vibration normal.   Right arm pinprick: decreased RUE.  Left arm pinprick: normal  Right leg pinprick: normal  Left leg pinprick: normal       Decreased sensation Right facial, Right Arm, Right Flank, RLE.      Gait, Coordination, and Reflexes      Gait  Gait: normal     Coordination   Romberg: negative  Finger to nose coordination: normal  Heel to shin coordination: normal     Tremor   Resting tremor: absent  Intention tremor: absent  Action tremor: absent     Reflexes   Right brachioradialis: 2+  Left brachioradialis: 2+  Right biceps: 2+  Left biceps: 2+  Right triceps: 2+  Left triceps: 2+  Right patellar: 2+  Left patellar: 2+  Right achilles: 2+  Left achilles: 2+  Right : 2+  Left : 2+     Physical Exam  Constitutional: He is oriented to person, place, and time.   Neurological: He is oriented to person, place, and time. He has normal strength. He has a normal Finger-Nose-Finger Test, a normal Heel to Montejo Test and a normal Romberg Test. Gait normal.   Reflex Scores:       Tricep reflexes are 2+ on the right side and 2+ on the left side.       Bicep reflexes are 2+ on the right side and 2+ on the left side.       Brachioradialis reflexes are 2+ on the right side and 2+ on the left side.       Patellar reflexes are 2+ on the right side and 2+ on the left side.       Achilles reflexes are 2+ on the right side and 2+ on the left side.    Assessment/Plan:       Diagnoses and all orders for this  visit:    1. Diabetic peripheral neuropathy (CMS/HCC) (Primary)  -     gabapentin (NEURONTIN) 600 MG tablet; TAKE ONE TABLET BY MOUTH EVERY MORNING THEN TAKE 1/2 TABLET BY MOUTH DAILY AT NOON THEN TAKE ONE TABLET BY MOUTH EVERY EVENING  Dispense: 75 tablet; Refill: 5    2. 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 Facial spasm  Comments:  Right. significantly improved.  Orders:  -     gabapentin (NEURONTIN) 600 MG tablet; TAKE ONE TABLET BY MOUTH EVERY MORNING THEN TAKE 1/2 TABLET BY MOUTH DAILY AT NOON THEN TAKE ONE TABLET BY MOUTH EVERY EVENING  Dispense: 75 tablet; Refill: 5    4. Late effects of CVA (cerebrovascular accident)  Comments:  continue aspirin, statin, HTN and DM2 management with PCP           Continue gabapentin. Continue DM2, HTN, HLD management with PCP-well controlled. F/U in 6 months or sooner if needed. Explained per our Neurologist they still recommend COVID vaccine with flu vaccine adverse reaction but ultimately this is entirely up to him. We did discuss due to co-morbidities he would be at high risk for COVID complications-he verbalized understanding and accepts risk of not being vaccinated at this time.  Reviewed medications, potential side effects and signs and symptoms to report. Discussed risk versus benefits of treatment plan with patient and/or family-including medications, labs and radiology that may be ordered. Addressed questions and concerns during visit. Patient and/or family verbalized understanding and agree with plan.    AS THE PROVIDER, I PERSONALLY WORE PPE DURING ENTIRE FACE TO FACE ENCOUNTER IN CLINIC WITH THE PATIENT. PATIENT ALSO WORE PPE DURING ENTIRE FACE TO FACE ENCOUNTER EXCEPT FOR A MAX OF 30 SECONDS  DURING NEUROLOGICAL EVALUATION OF CRANIAL NERVES AND THEN MASK WAS PLACED BACK OVER PATIENT FACE FOR REMAINDER OF VISIT. I WASHED MY HANDS BEFORE AND AFTER VISIT.    As part of this patient's treatment plan I am prescribing controlled substances. The patient has been made aware of appropriate use of such medications, including potential risk of somnolence, limited ability to drive and/or work safely, and potential for dependence or overdose. It has also been made clear that these medications are for use by the patient only, without concomitant use of alcohol or other substances unless prescribed. Keep out of reach of children.  Shoaib report has been reviewed. If this is going to be prescribed long term, [facility] Controlled Substance Agreement Contract has also been read and signed by patient and myself.    During this visit the following were done:  Labs Reviewed [x]    Labs Ordered []    Radiology Reports Reviewed []    Radiology Ordered []    PCP Records Reviewed [x]    Referring Provider Records Reviewed []    ER Records Reviewed []    Hospital Records Reviewed []    History Obtained From Family []    Radiology Images Reviewed []    Other Reviewed []    Records Requested []      Jen Espinoza, BRENDON  5/13/2021

## 2021-06-23 DIAGNOSIS — Z12.11 ENCOUNTER FOR SCREENING COLONOSCOPY: Primary | ICD-10-CM

## 2021-06-23 RX ORDER — SODIUM, POTASSIUM,MAG SULFATES 17.5-3.13G
2 SOLUTION, RECONSTITUTED, ORAL ORAL TAKE AS DIRECTED
Qty: 354 ML | Refills: 0 | Status: SHIPPED | OUTPATIENT
Start: 2021-06-23 | End: 2021-07-23

## 2021-07-07 ENCOUNTER — OFFICE VISIT (OUTPATIENT)
Dept: FAMILY MEDICINE CLINIC | Facility: CLINIC | Age: 66
End: 2021-07-07

## 2021-07-07 VITALS
HEIGHT: 66 IN | TEMPERATURE: 98 F | RESPIRATION RATE: 16 BRPM | DIASTOLIC BLOOD PRESSURE: 70 MMHG | OXYGEN SATURATION: 98 % | BODY MASS INDEX: 27.38 KG/M2 | SYSTOLIC BLOOD PRESSURE: 114 MMHG | HEART RATE: 77 BPM | WEIGHT: 170.4 LBS

## 2021-07-07 DIAGNOSIS — E11.649 UNCONTROLLED TYPE 2 DIABETES MELLITUS WITH HYPOGLYCEMIA WITHOUT COMA (HCC): Primary | ICD-10-CM

## 2021-07-07 LAB
EXPIRATION DATE: NORMAL
HBA1C MFR BLD: 7.7 %
Lab: NORMAL

## 2021-07-07 PROCEDURE — 99214 OFFICE O/P EST MOD 30 MIN: CPT | Performed by: FAMILY MEDICINE

## 2021-07-07 PROCEDURE — 3051F HG A1C>EQUAL 7.0%<8.0%: CPT | Performed by: FAMILY MEDICINE

## 2021-07-07 PROCEDURE — 83036 HEMOGLOBIN GLYCOSYLATED A1C: CPT | Performed by: FAMILY MEDICINE

## 2021-07-07 NOTE — PROGRESS NOTES
"     Follow Up Office Visit      Patient Name: Jose D Bailey  : 1955   MRN: [id number]     Chief Complaint:    Chief Complaint   Patient presents with   • Diabetes     f/u       History of Present Illness: Jose D Bailey is a 66 y.o. male who is here today to follow up with worsening diabetes.  Patient's A1c has increased today.  Patient does not check his blood sugars at home.  He takes insulin and reports taking 60 insulin units per morning.  Patient is on Jardiance which he takes for his health.  Patient is also on Metformin maximum dose.  Patient used to be on Actos.  This medication can exacerbate heart failure.  Patient is willing to start an injectable called Trulicity.    Patient reports that he has a colonoscopy scheduled for the  of this month.  Patient says he has to stop his oral medications and decrease his insulin by half per surgery recommendations.  I discussed this process with him today.  Given the fact that he does not check his blood sugars he will need to check his blood sugars days leading up to colonoscopy.  This will help us make a better decision on his insulin.    Review of symptoms was negative for hypoglycemic symptoms such as diaphoresis, presyncope, flushing    Physical exam: Patient's mood and affect was appropriate.  Patient's gait was steady.          Subjective        I have reviewed and the following portions of the patient's history were updated as appropriate: past family history, past medical history, past social history, past surgical history and problem list.    Medications:     Current Outpatient Medications:   •  aspirin 81 MG tablet, Take 81 mg by mouth Daily., Disp: , Rfl:   •  BD INSULIN SYRINGE U/F 31G X \" 1 ML misc, USE ONCE DAILY, Disp: 10 each, Rfl: 2  •  Empagliflozin (Jardiance) 25 MG tablet, Take 25 mg by mouth Daily., Disp: 30 tablet, Rfl: 11  •  gabapentin (NEURONTIN) 600 MG tablet, TAKE ONE TABLET BY MOUTH EVERY MORNING THEN TAKE 1/2 TABLET BY MOUTH " "DAILY AT NOON THEN TAKE ONE TABLET BY MOUTH EVERY EVENING, Disp: 75 tablet, Rfl: 5  •  glucose blood test strip, Check glucose twice a day., Disp: 50 each, Rfl: 7  •  glucose monitor monitoring kit, Check glucose twice a day., Disp: 1 each, Rfl: 0  •  Lantus 100 UNIT/ML injection, Inject 70 Units under the skin into the appropriate area as directed Daily., Disp: 20 mL, Rfl: 11  •  lisinopril (PRINIVIL,ZESTRIL) 20 MG tablet, Take 1 tablet by mouth Daily. for blood pressure, Disp: 30 tablet, Rfl: 11  •  metFORMIN (Glucophage) 1000 MG tablet, Take 1 tablet by mouth 2 (Two) Times a Day With Meals., Disp: 60 tablet, Rfl: 5  •  nebivolol (Bystolic) 5 MG tablet, Take 1 tablet by mouth Daily., Disp: 30 tablet, Rfl:   •  rosuvastatin (CRESTOR) 40 MG tablet, Take 1 tablet by mouth Daily., Disp: 30 tablet, Rfl: 11  •  sodium-potassium-magnesium sulfates (Suprep Bowel Prep Kit) 17.5-3.13-1.6 GM/177ML solution oral solution, Take 2 bottles by mouth Take As Directed., Disp: 354 mL, Rfl: 0  •  Dulaglutide 0.75 MG/0.5ML solution pen-injector, Inject 0.75 mg under the skin into the appropriate area as directed 1 (One) Time Per Week., Disp: 2 mL, Rfl: 2    Allergies:   Allergies   Allergen Reactions   • Sulfa Antibiotics Hives   • Atorvastatin Myalgia   • Pravachol [Pravastatin Sodium] Myalgia   • Pravastatin Myalgia   • Simvastatin Myalgia   • Pseudoephedrine Rash       Objective     Physical Exam: Please see HPI for physical exam  Vital Signs:   Vitals:    07/07/21 0856   BP: 114/70   Pulse: 77   Resp: 16   Temp: 98 °F (36.7 °C)   TempSrc: Temporal   SpO2: 98%   Weight: 77.3 kg (170 lb 6.4 oz)   Height: 167.6 cm (66\")   PainSc: 0-No pain     Body mass index is 27.5 kg/m².          Assessment / Plan      Assessment/Plan:   Diagnoses and all orders for this visit:    1. Uncontrolled type 2 diabetes mellitus with hypoglycemia without coma (CMS/HCC) (Primary)  -     POC Glycosylated Hemoglobin (Hb A1C)  -     Discontinue: Dulaglutide " 0.75 MG/0.5ML solution pen-injector; Inject 0.75 mg under the skin into the appropriate area as directed 1 (One) Time Per Week.  Dispense: 2 mL; Refill: 2  -     Dulaglutide 0.75 MG/0.5ML solution pen-injector; Inject 0.75 mg under the skin into the appropriate area as directed 1 (One) Time Per Week.  Dispense: 2 mL; Refill: 2    Recommend starting Trulicity for his diabetes.  Patient can start this after his colonoscopy.  After starting this medication I did warn him regarding the side effects such as nausea if he has large meals.  Also warned the patient that he may have hypoglycemic events so he needs to check his blood sugars.  Recommend patient check his blood sugars the days leading up to the colonoscopy and he can call me 2 days before the colonoscopy let me know how they are running.  He will stop his oral medications the day before colonoscopy, and reduce his insulin to half the day before.  Patient will take half his dose the day of the colonoscopy as well.    Follow Up:   Return in about 3 months (around 10/7/2021).    Adalberto Albarado DO  [facility] [place] [place]

## 2021-07-16 ENCOUNTER — TELEPHONE (OUTPATIENT)
Dept: FAMILY MEDICINE CLINIC | Facility: CLINIC | Age: 66
End: 2021-07-16

## 2021-07-16 NOTE — TELEPHONE ENCOUNTER
PATIENT CALLED TO REPORT HIS BLOOD SUGAR LEVELS:  68 LOWEST IN THE MORNINGS AND HIGHEST IN THE MORNINGS .    167 LOWEST IN THE EVENINGS AND THE HIGHEST IN THE EVENING .    PATIENT BP LOWEST 107/68 AND HIGHEST 119/77.    PATIENT CALL BACK NUMBER VERIFIED [phone]     PATIENT NEEDS A CALL BACK TO KNOW WHAT TO CONTINUE TO DO ABOUT MEDICATION Monday MORNING BEFORE THE COLONOSCOPY

## 2021-07-19 ENCOUNTER — OUTSIDE FACILITY SERVICE (OUTPATIENT)
Dept: GASTROENTEROLOGY | Facility: CLINIC | Age: 66
End: 2021-07-19

## 2021-07-19 PROCEDURE — 45385 COLONOSCOPY W/LESION REMOVAL: CPT | Performed by: INTERNAL MEDICINE

## 2021-07-19 PROCEDURE — 88305 TISSUE EXAM BY PATHOLOGIST: CPT | Performed by: INTERNAL MEDICINE

## 2021-07-19 PROCEDURE — 45380 COLONOSCOPY AND BIOPSY: CPT | Performed by: INTERNAL MEDICINE

## 2021-07-20 ENCOUNTER — LAB REQUISITION (OUTPATIENT)
Dept: LAB | Facility: HOSPITAL | Age: 66
End: 2021-07-20

## 2021-07-20 DIAGNOSIS — K57.30 DIVERTICULOSIS OF LARGE INTESTINE WITHOUT PERFORATION OR ABSCESS WITHOUT BLEEDING: ICD-10-CM

## 2021-07-20 DIAGNOSIS — D12.4 BENIGN NEOPLASM OF DESCENDING COLON: ICD-10-CM

## 2021-07-20 DIAGNOSIS — D12.0 BENIGN NEOPLASM OF CECUM: ICD-10-CM

## 2021-07-20 DIAGNOSIS — Z12.11 ENCOUNTER FOR SCREENING FOR MALIGNANT NEOPLASM OF COLON: ICD-10-CM

## 2021-07-21 LAB
CYTO UR: NORMAL
LAB AP CASE REPORT: NORMAL
LAB AP CLINICAL INFORMATION: NORMAL
PATH REPORT.FINAL DX SPEC: NORMAL
PATH REPORT.GROSS SPEC: NORMAL

## 2021-07-21 RX ORDER — SYRINGE AND NEEDLE,INSULIN,1ML 31 GX5/16"
SYRINGE, EMPTY DISPOSABLE MISCELLANEOUS
Qty: 100 EACH | Refills: 2 | Status: SHIPPED | OUTPATIENT
Start: 2021-07-21 | End: 2022-05-25 | Stop reason: SDUPTHER

## 2021-07-21 NOTE — PROGRESS NOTES
Subjective:     Encounter Date:07/23/2021    Patient ID: Jose D Bailey is a 66 y.o.  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       REFERRING PHYSICIAN: Clinton Day MD  CURRENT PHYSICIAN: Adalberto Albarado DO  NEUROLOGIST:  BRENDON Pagan   UROLOGIST: Dr. Morin  GASTROENTEROLOGIST: Ruperto Barroso MD  NEPHROLOGIST: [facility]    Chief Complaint:   Chief Complaint   Patient presents with   • Hypertensive heart disease without heart failure       Problem List:  1. Probable hypertensive cardiovascular disease:  a. Apparent remote screening 2D echocardiogram, data deficit ([facility]), approximately 2005.   b. Apparent unremarkable serial treadmill GXT/Cardiolite GXT, data deficit ([facility], serially every 3-4 years x14 years).   c. Abnormal screening treadmill GXT showing physical deconditioning (accelerated HR with 107% age-predicted MHR with 50% PEC) with post-exercise EKG changes in the absence of anginal type chest pain, as well as frequent PVCs/PACs during exercise without complex forms; without accelerated blood pressure ([facility]), 04/04/12.   d. Acceptable IV LEXIscan Cardiolite study, LVEF (0.68) with residual class I symptoms, June 2012.  e. Residual class I symptoms, July 2020, July 2021.  2. Hypertension with remote suboptimal control, improved.  3. Dyslipidemia, maintained on statin.   4. Uncontrolled type 2 insulin dependent diabetes (insulin dependent since 2009); diagnosed approximately 1998, hemoglobin A1c 8.5% (November 2016); hemoglobin A1c 9.2% (September 2017); hemoglobin A1c 7.3% (September 2018); 7.7% (May 2019), 7.1% May 2020, 7.7% July 2021.  5. Overweight, BMI 29.1  6. Bilateral plantar fasciitis; utilizing orthotics.   7. Cataract extraction (OD, 2009; OS, 2011).   8. Remote TIA versus stroke with nonobstructive disease on  carotid duplex study, acceptable chest x-ray and normal MRI with and without contrast, negative CT cerebral perfusion study with and without contrast, negative neck CTA, normal intracranial CTA, and normal unenhanced CT scan (November 2016) with mild residual right-sided weakness May 2018, with continued neurology followup and right-sided paresthesias with central pain syndrome following suspected left subthalamic lacunar stroke, November 2016    Allergies   Allergen Reactions   • Sulfa Antibiotics Hives   • Atorvastatin Myalgia   • Pravachol [Pravastatin Sodium] Myalgia   • Pravastatin Myalgia   • Simvastatin Myalgia   • Pseudoephedrine Rash       Current Outpatient Medications:   •  aspirin 81 MG tablet, Take 81 mg by mouth Daily., Disp: , Rfl:   •  Dulaglutide 0.75 MG/0.5ML solution pen-injector, Inject 0.75 mg under the skin into the appropriate area as directed 1 (One) Time Per Week., Disp: 2 mL, Rfl: 2  •  Empagliflozin (Jardiance) 25 MG tablet, Take 25 mg by mouth Daily., Disp: 30 tablet, Rfl: 11  •  gabapentin (NEURONTIN) 600 MG tablet, TAKE ONE TABLET BY MOUTH EVERY MORNING THEN TAKE 1/2 TABLET BY MOUTH DAILY AT NOON THEN TAKE ONE TABLET BY MOUTH EVERY EVENING (Patient taking differently: 2 (Two) Times a Day. TAKE ONE TABLET BY MOUTH EVERY MORNING THEN TAKE 1/2 TABLET BY MOUTH DAILY AT NOON THEN TAKE ONE TABLET BY MOUTH EVERY EVENING), Disp: 75 tablet, Rfl: 5  •  glucose blood test strip, Check glucose twice a day., Disp: 50 each, Rfl: 7  •  glucose monitor monitoring kit, Check glucose twice a day., Disp: 1 each, Rfl: 0  •  Lantus 100 UNIT/ML injection, Inject 70 Units under the skin into the appropriate area as directed Daily. (Patient taking differently: Inject 60 Units under the skin into the appropriate area as directed Daily.), Disp: 20 mL, Rfl: 11  •  lisinopril (PRINIVIL,ZESTRIL) 10 MG tablet, Take 101 mg by mouth Daily., Disp: , Rfl:   •  metFORMIN (Glucophage) 1000 MG tablet, Take 1 tablet by  "mouth 2 (Two) Times a Day With Meals., Disp: 60 tablet, Rfl: 5  •  nebivolol (Bystolic) 5 MG tablet, Take 1 tablet by mouth Daily., Disp: 30 tablet, Rfl:   •  rosuvastatin (CRESTOR) 40 MG tablet, Take 1 tablet by mouth Daily., Disp: 30 tablet, Rfl: 11  •  TRUEplus Insulin Syringe 31G X 5/16\" 1 ML misc, USE TO INJECT INSULIN ONCE DAILY, Disp: 100 each, Rfl: 2    History of Present Illness: Patient returns for scheduled 6-month follow up. Patient has been doing well from a cardiovascular standpoint. Patient denies chest pain, shortness of breath, palpitations, edema, dizziness, and syncope. He has been working more regularly and staying active. He reports getting most of his exercise from walking. He has been sleeping well and feels at rest. The patient has not been immunized against COVID-19. The patient previously had blood work done at the request of Dr. Morin which found COVID-19 antibodies; data deficit. He is hesitant to get the vaccine after a prior reaction to remote influenza immunization. Patient has had no interim ER visits, hospitalizations, serious illnesses, or injuries.        ROS   Obtained and negative except as outlined in problem list and HPI.    Procedures       Objective:       Vitals:    07/23/21 1453 07/23/21 1454   BP: 135/73 139/77   BP Location: Right arm Right arm   Patient Position: Sitting Standing   Pulse: 76 79   SpO2: 96%    Weight: 76.9 kg (169 lb 9.6 oz)    Height: 167.6 cm (66\")      Body mass index is 27.37 kg/m².  Last weight: 180 lbs    Vitals reviewed.   Constitutional:       Appearance: Well-developed.   Neck:      Thyroid: No thyromegaly.      Vascular: No carotid bruit or JVD.      Lymphadenopathy: No cervical adenopathy.   Pulmonary:      Effort: Pulmonary effort is normal.      Breath sounds: Normal breath sounds. No wheezing. No rhonchi. No rales.   Cardiovascular:      Regular rhythm.      Murmurs: There is a grade 1/6 mid frequency midsystolic murmur at the URSB.      No " gallop. No S3 gallop.   Pulses:     Dorsalis pedis: 2+ bilaterally.     Posterior tibial: 2+ bilaterally.  Edema:     Peripheral edema absent.   Abdominal:      Palpations: Abdomen is soft. There is no abdominal mass.      Tenderness: There is no abdominal tenderness.   Musculoskeletal: Normal range of motion. Skin:     General: Skin is warm and dry.      Findings: No rash.   Neurological:      Mental Status: Alert and oriented to person, place, and time.           Lab Review:   Lab Results   Component Value Date    GLUCOSE 149 (H) 04/06/2021    BUN 10 04/06/2021    CREATININE 1.22 04/06/2021    EGFRIFNONA 59 (L) 04/06/2021    BCR 8.2 04/06/2021     04/06/2021    K 4.6 04/06/2021     (H) 04/06/2021    CO2 24.6 04/06/2021    CALCIUM 9.4 04/06/2021    ALBUMIN 4.50 04/06/2021    AST 19 04/06/2021    ALT 20 04/06/2021       Lab Results   Component Value Date    WBC 8.56 05/11/2020    HGB 17.2 05/11/2020    HCT 51.2 (H) 05/11/2020    MCV 86.3 05/11/2020     05/11/2020       Lab Results   Component Value Date    HGBA1C 7.7 07/07/2021       Lab Results   Component Value Date    TSH 1.100 02/27/2020       Lab Results   Component Value Date    CHOL 126 04/06/2021    CHOL 128 02/27/2020    CHOL 131 02/20/2019     Lab Results   Component Value Date    TRIG 74 04/06/2021    TRIG 105 02/27/2020    TRIG 100 02/20/2019     Lab Results   Component Value Date    HDL 42 04/06/2021    HDL 41 02/27/2020    HDL 44 02/20/2019     Lab Results   Component Value Date    LDL 69 04/06/2021    LDL 66 02/27/2020    LDL 70 02/20/2019     EEG, 11/20/2020:  Normal EEG in the awake and asleep states. 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        Assessment:       Overall continued acceptable course with no new interim cardiopulmonary complaints with good functional status. We will defer additional diagnostic or therapeutic  intervention from a cardiac perspective at this time.     Diagnosis Plan   1. Hypertensive heart disease without heart failure  No angina or CHF symptoms on current therapy. Continue current treatment.   2. Essential hypertension  Acceptable control. Continue current treatment.   3. Pure hypercholesterolemia  Well controlled. Continue current treatment.   4. Vasovagal syncope  No recurrence; continue current treatment.    5. Uncontrolled type 2 diabetes mellitus with hypoglycemia without coma (CMS/HCC)  Uncontrolled. Improving course on current therapy with accompanying current weight loss.          Plan:         1. Patient to continue current medications and close follow up with the above providers.  2. Tentative cardiology follow up in March 2022 or patient may return sooner PRN.  3. COVID-19 immunizations encouraged.    Scribed for Daquan Melgar MD by Joshua Enriquez. 7/23/2021  15:09 EDT    I, Daquan Melgar MD, [facility], personally performed the services described in this documentation as scribed by the above named individual in my presence, and it is both accurate and complete. At 15:09 EDT on 07/23/2021

## 2021-07-22 ENCOUNTER — TELEPHONE (OUTPATIENT)
Dept: GASTROENTEROLOGY | Facility: CLINIC | Age: 66
End: 2021-07-22

## 2021-07-22 NOTE — TELEPHONE ENCOUNTER
----- Message from Ruperto Barroso MD sent at 7/21/2021  3:31 PM EDT -----  Please let Jose D know he had adenoma. Follow up in 5 years is recommended

## 2021-07-23 ENCOUNTER — OFFICE VISIT (OUTPATIENT)
Dept: CARDIOLOGY | Facility: CLINIC | Age: 66
End: 2021-07-23

## 2021-07-23 VITALS
DIASTOLIC BLOOD PRESSURE: 77 MMHG | HEART RATE: 79 BPM | SYSTOLIC BLOOD PRESSURE: 139 MMHG | OXYGEN SATURATION: 96 % | BODY MASS INDEX: 27.26 KG/M2 | WEIGHT: 169.6 LBS | HEIGHT: 66 IN

## 2021-07-23 DIAGNOSIS — E78.00 PURE HYPERCHOLESTEROLEMIA: ICD-10-CM

## 2021-07-23 DIAGNOSIS — R55 VASOVAGAL SYNCOPE: ICD-10-CM

## 2021-07-23 DIAGNOSIS — E11.649 UNCONTROLLED TYPE 2 DIABETES MELLITUS WITH HYPOGLYCEMIA WITHOUT COMA (HCC): ICD-10-CM

## 2021-07-23 DIAGNOSIS — I10 ESSENTIAL HYPERTENSION: ICD-10-CM

## 2021-07-23 DIAGNOSIS — I11.9 HYPERTENSIVE HEART DISEASE WITHOUT HEART FAILURE: Primary | ICD-10-CM

## 2021-07-23 PROCEDURE — 99214 OFFICE O/P EST MOD 30 MIN: CPT | Performed by: INTERNAL MEDICINE

## 2021-07-23 RX ORDER — LISINOPRIL 10 MG/1
101 TABLET ORAL DAILY
COMMUNITY
Start: 2021-07-18 | End: 2021-10-19 | Stop reason: SDUPTHER

## 2021-08-12 RX ORDER — BISOPROLOL FUMARATE 5 MG/1
TABLET, FILM COATED ORAL
Qty: 30 TABLET | Refills: 11 | OUTPATIENT
Start: 2021-08-12

## 2021-08-17 ENCOUNTER — TELEPHONE (OUTPATIENT)
Dept: CARDIOLOGY | Facility: CLINIC | Age: 66
End: 2021-08-17

## 2021-08-17 RX ORDER — BISOPROLOL FUMARATE 5 MG/1
5 TABLET, FILM COATED ORAL DAILY
Qty: 90 TABLET | Refills: 3 | Status: SHIPPED | OUTPATIENT
Start: 2021-08-17 | End: 2022-03-25 | Stop reason: DRUGHIGH

## 2021-08-17 NOTE — TELEPHONE ENCOUNTER
Patient's insurance denied Bystolic 5 mg daily so patient has been taking bisoprolol 5 mg daily. Bisopolol not currently listed on patient's med list.      Ok to order bisoprolol 5 mg daily?       Please advise.

## 2021-08-17 NOTE — TELEPHONE ENCOUNTER
Noted; concur with prescribing bisoprolol and please request whenever he comes to office to bring his medicines or a complete accurate list.    Thanks!

## 2021-10-19 ENCOUNTER — OFFICE VISIT (OUTPATIENT)
Dept: FAMILY MEDICINE CLINIC | Facility: CLINIC | Age: 66
End: 2021-10-19

## 2021-10-19 VITALS
SYSTOLIC BLOOD PRESSURE: 124 MMHG | HEART RATE: 67 BPM | BODY MASS INDEX: 26.81 KG/M2 | TEMPERATURE: 97.7 F | RESPIRATION RATE: 16 BRPM | DIASTOLIC BLOOD PRESSURE: 62 MMHG | WEIGHT: 166.8 LBS | OXYGEN SATURATION: 98 % | HEIGHT: 66 IN

## 2021-10-19 DIAGNOSIS — E11.649 UNCONTROLLED TYPE 2 DIABETES MELLITUS WITH HYPOGLYCEMIA WITHOUT COMA (HCC): Primary | ICD-10-CM

## 2021-10-19 DIAGNOSIS — I10 ESSENTIAL HYPERTENSION: ICD-10-CM

## 2021-10-19 LAB
EXPIRATION DATE: NORMAL
HBA1C MFR BLD: 6.1 %
Lab: NORMAL

## 2021-10-19 PROCEDURE — 99214 OFFICE O/P EST MOD 30 MIN: CPT | Performed by: FAMILY MEDICINE

## 2021-10-19 PROCEDURE — 83036 HEMOGLOBIN GLYCOSYLATED A1C: CPT | Performed by: FAMILY MEDICINE

## 2021-10-19 RX ORDER — LISINOPRIL 10 MG/1
10 TABLET ORAL DAILY
Qty: 90 TABLET | Refills: 2 | Status: SHIPPED | OUTPATIENT
Start: 2021-10-19 | End: 2022-03-16 | Stop reason: SDUPTHER

## 2021-10-19 RX ORDER — INSULIN GLARGINE 100 [IU]/ML
50 INJECTION, SOLUTION SUBCUTANEOUS DAILY
Qty: 20 ML | Refills: 11 | Status: SHIPPED | OUTPATIENT
Start: 2021-10-19 | End: 2022-03-16 | Stop reason: SDUPTHER

## 2021-10-19 NOTE — PROGRESS NOTES
Follow Up Office Visit      Patient Name: Jose D Bailey  : 1955   MRN: [id number]     Chief Complaint:    Chief Complaint   Patient presents with   • Diabetes     f/u       History of Present Illness: Jose D Bailey is a 66 y.o. male who is here today to follow up with diabetes and hypertension.  His diabetes is very well controlled and his hypertension is very well controlled.  We started patient on Trulicity previously and his A1c has improved greatly.  Today we discussed decreasing his insulin.  Patient denies having low blood sugars.  He does not check his blood sugar often.        Review of systems was negative for chest pain positive for numbness      Physical exam: Patient's heart and lung exam was normal.      Subjective        I have reviewed and the following portions of the patient's history were updated as appropriate: past family history, past medical history, past social history, past surgical history and problem list.    Medications:     Current Outpatient Medications:   •  aspirin 81 MG tablet, Take 81 mg by mouth Daily., Disp: , Rfl:   •  bisoprolol (ZEBeta) 5 MG tablet, Take 1 tablet by mouth Daily., Disp: 90 tablet, Rfl: 3  •  Dulaglutide 0.75 MG/0.5ML solution pen-injector, Inject 0.75 mg under the skin into the appropriate area as directed 1 (One) Time Per Week., Disp: 2 mL, Rfl: 2  •  Empagliflozin (Jardiance) 25 MG tablet, Take 25 mg by mouth Daily., Disp: 30 tablet, Rfl: 11  •  gabapentin (NEURONTIN) 600 MG tablet, TAKE ONE TABLET BY MOUTH EVERY MORNING THEN TAKE 1/2 TABLET BY MOUTH DAILY AT NOON THEN TAKE ONE TABLET BY MOUTH EVERY EVENING (Patient taking differently: 2 (Two) Times a Day. TAKE ONE TABLET BY MOUTH EVERY MORNING THEN TAKE 1/2 TABLET BY MOUTH DAILY AT NOON THEN TAKE ONE TABLET BY MOUTH EVERY EVENING), Disp: 75 tablet, Rfl: 5  •  glucose blood test strip, Check glucose twice a day., Disp: 50 each, Rfl: 7  •  glucose monitor monitoring kit, Check glucose twice a day.,  "Disp: 1 each, Rfl: 0  •  Lantus 100 UNIT/ML injection, Inject 50 Units under the skin into the appropriate area as directed Daily., Disp: 20 mL, Rfl: 11  •  lisinopril (PRINIVIL,ZESTRIL) 10 MG tablet, Take 1 tablet by mouth Daily., Disp: 90 tablet, Rfl: 2  •  metFORMIN (Glucophage) 1000 MG tablet, Take 1 tablet by mouth 2 (Two) Times a Day With Meals., Disp: 180 tablet, Rfl: 1  •  rosuvastatin (CRESTOR) 40 MG tablet, Take 1 tablet by mouth Daily., Disp: 30 tablet, Rfl: 11  •  TRUEplus Insulin Syringe 31G X 5/16\" 1 ML misc, USE TO INJECT INSULIN ONCE DAILY, Disp: 100 each, Rfl: 2    Allergies:   Allergies   Allergen Reactions   • Sulfa Antibiotics Hives   • Atorvastatin Myalgia   • Pravachol [Pravastatin Sodium] Myalgia   • Pravastatin Myalgia   • Simvastatin Myalgia   • Pseudoephedrine Rash       Objective     Physical Exam: Please see HPI for physical exam  Vital Signs:   Vitals:    10/19/21 0910   BP: 124/62   Pulse: 67   Resp: 16   Temp: 97.7 °F (36.5 °C)   TempSrc: Temporal   SpO2: 98%   Weight: 75.7 kg (166 lb 12.8 oz)   Height: 167.6 cm (66\")   PainSc: 0-No pain      Body mass index is 26.92 kg/m².          Assessment / Plan      Assessment/Plan:   Diagnoses and all orders for this visit:    1. Uncontrolled type 2 diabetes mellitus with hypoglycemia without coma (HCC) (Primary)  -     POC Glycosylated Hemoglobin (Hb A1C)  -     Lantus 100 UNIT/ML injection; Inject 50 Units under the skin into the appropriate area as directed Daily.  Dispense: 20 mL; Refill: 11  -     metFORMIN (Glucophage) 1000 MG tablet; Take 1 tablet by mouth 2 (Two) Times a Day With Meals.  Dispense: 180 tablet; Refill: 1  -     Dulaglutide 0.75 MG/0.5ML solution pen-injector; Inject 0.75 mg under the skin into the appropriate area as directed 1 (One) Time Per Week.  Dispense: 2 mL; Refill: 2    2. Essential hypertension  -     lisinopril (PRINIVIL,ZESTRIL) 10 MG tablet; Take 1 tablet by mouth Daily.  Dispense: 90 tablet; Refill: 2    Will " decrease insulin to 50 units.  Next visit recommend increasing Trulicity and decreasing insulin further.    Follow Up:   Return in about 3 months (around 1/19/2022).    Adalberto Albarado DO  [facility] [facility] [place]

## 2021-11-04 ENCOUNTER — OFFICE VISIT (OUTPATIENT)
Dept: NEUROLOGY | Facility: CLINIC | Age: 66
End: 2021-11-04

## 2021-11-04 VITALS
SYSTOLIC BLOOD PRESSURE: 118 MMHG | DIASTOLIC BLOOD PRESSURE: 72 MMHG | TEMPERATURE: 97.4 F | OXYGEN SATURATION: 98 % | HEIGHT: 66 IN | WEIGHT: 165 LBS | HEART RATE: 76 BPM | BODY MASS INDEX: 26.52 KG/M2

## 2021-11-04 DIAGNOSIS — E11.42 DIABETIC PERIPHERAL NEUROPATHY (HCC): ICD-10-CM

## 2021-11-04 DIAGNOSIS — G89.0 CENTRAL PAIN SYNDROME: Primary | Chronic | ICD-10-CM

## 2021-11-04 DIAGNOSIS — G56.01 CARPAL TUNNEL SYNDROME OF RIGHT WRIST: ICD-10-CM

## 2021-11-04 DIAGNOSIS — G83.21 MONOPARESIS OF UPPER EXTREMITY AFFECTING RIGHT DOMINANT SIDE (HCC): ICD-10-CM

## 2021-11-04 DIAGNOSIS — I69.90 LATE EFFECTS OF CVA (CEREBROVASCULAR ACCIDENT): ICD-10-CM

## 2021-11-04 DIAGNOSIS — G51.39 FACIAL SPASM: ICD-10-CM

## 2021-11-04 PROBLEM — E11.3399 MODERATE NONPROLIFERATIVE DIABETIC RETINOPATHY ASSOCIATED WITH TYPE 2 DIABETES MELLITUS: Status: ACTIVE | Noted: 2018-04-04

## 2021-11-04 PROCEDURE — 99214 OFFICE O/P EST MOD 30 MIN: CPT | Performed by: NURSE PRACTITIONER

## 2021-11-04 RX ORDER — GABAPENTIN 600 MG/1
600 TABLET ORAL 2 TIMES DAILY
Qty: 180 TABLET | Refills: 1 | Status: SHIPPED | OUTPATIENT
Start: 2021-11-04 | End: 2022-04-29 | Stop reason: SDUPTHER

## 2021-11-04 NOTE — PROGRESS NOTES
Subjective:     Patient ID: Jose D Bailey is a 66 y.o. male.    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 Chronic altered sensation in temperatures and textures on right side since CVA, especially with softer objects. Hard objects are easier to hold. Currently well controlled on gabapentin 600mg 1 in AM and 1 in PM; typically does not remember mid day dose and feels fine without this. He needs refills on gabapentin. He also reports resolution of right facial spasms. He is about to have full oral tooth extraction and then dentures. No new concerns for Neurology. Working with PCP on diabetes management.     He does report some continued right hand pain and numbness and tingling in first through third fingers.  EMG and NCVS 2018 confirmed carpal tunnel syndrome. He feels that it may be a combination of this along with arthritis.  He is currently not interested in further work-up, injections or orthopedic referral at this time.     He does continue to work on [employer] with an [employer] in [place], [place].      Prior workup:  He had EMG/NCVS 6/7/18 completed on all 4 extremities showing right median neuropathy at wrist but was otherwise normal. 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  He has had no recurrent stroke symptoms. History of left subthalamic lacunar stroke in November 2016 with normal MRI Brain. On aspirin and Crestor for 2nd stroke prevention.    Follows with [facility] [place] for Left eye diabetic retinopathy and has injections every 4 weeks.    The following portions of the patient's history were reviewed and updated as  appropriate: allergies, current medications, past family history, past medical history, past social history, past surgical history and problem list.    Past Medical History:   Diagnosis Date   • Acute sinusitis    • Acute upper respiratory infection    • Cataract     Cataract extraction (OD, 2009;  OS, 2011).    • Chest pain    • Decreased ROM of left shoulder    • Diabetes mellitus (HCC)    • Diabetic retinopathy (HCC)    • Diabetic retinopathy associated with controlled type 2 diabetes mellitus (HCC) 2020    Dr. Salinas giving shots in eyes    • Dyslipidemia     Remained on statin   • ED (erectile dysfunction)    • H/O cardiovascular stress test     2012   • Herpes zoster    • Hyperlipidemia    • Hypertension    • Insulin dependent type 2 diabetes mellitus (HCC)     Type 2 insulin dependent diabetes (insulin dependent since 2009);  diagnosed approximately 1998.    • Junctional nevus of right forearm    • Nevus, atypical     Right arm   • Overweight     Mild overweight status, BMI 26.7.     • Plantar fasciitis     Bilateral plantar fasciitis;  utilizing orthotics.    • Quadriceps muscle strain    • Vasovagal syncope        Past Surgical History:   Procedure Laterality Date   • CATARACT EXTRACTION      OS   • COLONOSCOPY      10/11/2010       Social History     Socioeconomic History   • Marital status:    Tobacco Use   • Smoking status: Never Smoker   • Smokeless tobacco: Never Used   Vaping Use   • Vaping Use: Never used   Substance and Sexual Activity   • Alcohol use: No   • Drug use: No   • Sexual activity: Defer     Partners: Female     Comment:        Family History   Problem Relation Age of Onset   • Stroke Mother    • Breast cancer Mother    • Cancer Father         lung    • Stroke Father    • Heart attack Father    • Other Father         CABG x6   • Coronary artery disease Father    • Diabetes Father    • Lung cancer Father    • Coronary artery disease Maternal Grandmother         Review of  "Systems   Constitutional: Negative for chills, fatigue, fever and unexpected weight change.   HENT: Negative for ear pain, hearing loss, nosebleeds, rhinorrhea and sore throat.    Eyes: Negative for photophobia, pain, discharge, itching and visual disturbance.   Respiratory: Negative for cough, chest tightness, shortness of breath and wheezing.    Cardiovascular: Negative for chest pain, palpitations and leg swelling.   Gastrointestinal: Negative for abdominal pain, blood in stool, constipation, diarrhea, nausea and vomiting.   Genitourinary: Negative for dysuria, frequency, hematuria and urgency.   Musculoskeletal: Negative for arthralgias, back pain, gait problem, joint swelling, myalgias, neck pain and neck stiffness.   Skin: Negative for rash and wound.   Allergic/Immunologic: Negative for environmental allergies and food allergies.   Neurological: Negative for dizziness, tremors, seizures, syncope, speech difficulty, weakness, light-headedness, numbness and headaches.   Hematological: Negative for adenopathy. Does not bruise/bleed easily.   Psychiatric/Behavioral: Negative for agitation, confusion, decreased concentration, hallucinations, sleep disturbance and suicidal ideas. The patient is not nervous/anxious.    All other systems reviewed and are negative.       Objective:  /72   Pulse 76   Temp 97.4 °F (36.3 °C)   Ht 167.6 cm (66\")   Wt 74.8 kg (165 lb)   SpO2 98%   BMI 26.63 kg/m²     Neurologic Exam  Mental Status   Oriented to person, place, and time.   Level of consciousness: alert     Cranial Nerves      CN II   Visual fields full to confrontation.      CN III, IV, VI   Pupils are equal, round, and reactive to light.  Extraocular motions are normal.      CN V   Right facial sensation deficit: forehead, cheeks and mandible  Left facial sensation deficit: none     CN VII   Facial expression full, symmetric. Minimal right facial droop-very subtle and chronic     CN VIII   CN VIII normal. "      CN IX, X   CN IX normal.   CN X normal.      CN XI   CN XI normal.      CN XII   CN XII normal.      Motor Exam   Muscle bulk: normal  Overall muscle tone: normal     Strength   Strength 5/5 throughout.      Sensory Exam   Right arm light touch: decreased RUE.  Left arm light touch: normal  Right leg light touch: normal  Left leg light touch: normal  Vibration normal.   Right arm pinprick: decreased RUE.  Left arm pinprick: normal  Right leg pinprick: normal  Left leg pinprick: normal       Decreased sensation Right facial, Right Arm, Right Flank, RLE.      Gait, Coordination, and Reflexes      Gait  Gait: normal     Coordination   Romberg: negative  Finger to nose coordination: normal  Heel to shin coordination: normal     Tremor   Resting tremor: absent  Intention tremor: absent  Action tremor: absent     Reflexes   Right brachioradialis: 2+  Left brachioradialis: 2+  Right biceps: 2+  Left biceps: 2+  Right triceps: 2+  Left triceps: 2+  Right patellar: 2+  Left patellar: 2+  Right achilles: 2+  Left achilles: 2+  Right : 2+  Left : 2+     Physical Exam  Constitutional: He is oriented to person, place, and time.   Neurological: He is oriented to person, place, and time. He has normal strength. He has a normal Finger-Nose-Finger Test, a normal Heel to Montejo Test and a normal Romberg Test. Gait normal.   Reflex Scores:       Tricep reflexes are 2+ on the right side and 2+ on the left side.       Bicep reflexes are 2+ on the right side and 2+ on the left side.       Brachioradialis reflexes are 2+ on the right side and 2+ on the left side.       Patellar reflexes are 2+ on the right side and 2+ on the left side.       Achilles reflexes are 2+ on the right side and 2+ on the left side.    Assessment/Plan:       Diagnoses and all orders for this visit:    1. Central pain syndrome (Primary)  Comments:  continue gabapentin 600mg BID    2. 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 Diabetic peripheral neuropathy (HCC)  -     gabapentin (NEURONTIN) 600 MG tablet; Take 1 tablet by mouth 2 (Two) Times a Day.  Dispense: 180 tablet; Refill: 1    4. Facial spasm  Comments:  Right. minimal.  Orders:  -     gabapentin (NEURONTIN) 600 MG tablet; Take 1 tablet by mouth 2 (Two) Times a Day.  Dispense: 180 tablet; Refill: 1    5. Late effects of CVA (cerebrovascular accident)  Comments:  continue aspirin and statin    6. Carpal tunnel syndrome of right wrist  Comments:  confirmed on emg/ncvs mild right CTS 2018, consider xray of right hand and orthopedics ref. declines today.           No changes today. Continue current meds. F/U in 6 months or sooner if needed. If carpal tunnel/right hand pain worsens, contact our office or PCP for orthopedics referral.  Reviewed medications, potential side effects and signs and symptoms to report. Discussed risk versus benefits of treatment plan with patient and/or family-including medications, labs and radiology that may be ordered. Addressed questions and concerns during visit. Patient and/or family verbalized understanding and agree with plan.    AS THE PROVIDER, I PERSONALLY WORE PPE DURING ENTIRE FACE TO FACE ENCOUNTER IN CLINIC WITH THE PATIENT. PATIENT ALSO WORE PPE DURING ENTIRE FACE TO FACE ENCOUNTER EXCEPT FOR A MAX OF 30 SECONDS DURING NEUROLOGICAL EVALUATION OF CRANIAL NERVES AND THEN MASK WAS PLACED BACK OVER PATIENT FACE FOR REMAINDER OF VISIT. I WASHED MY HANDS BEFORE AND AFTER VISIT.    As part of this patient's treatment plan I am prescribing controlled substances. The patient has been made aware of appropriate use of such medications, including potential risk of somnolence, limited ability to drive and/or work safely, and potential for dependence or overdose. It has also been made clear that these medications are for use by the patient only, without concomitant use of alcohol or other substances  unless prescribed. Keep out of reach of children.  Shoaib report has been reviewed. If this is going to be prescribed long term, [facility] Controlled Substance Agreement Contract has also been read and signed by patient and myself.    During this visit the following were done:  Labs Reviewed [x]w/ pcp   Labs Ordered []    Radiology Reports Reviewed []    Radiology Ordered []    PCP Records Reviewed []    Referring Provider Records Reviewed []    ER Records Reviewed []    Hospital Records Reviewed []    History Obtained From Family []    Radiology Images Reviewed []    Other Reviewed [x]    Records Requested []      Jen Espinoza, APRN  11/4/2021

## 2022-01-14 ENCOUNTER — TELEPHONE (OUTPATIENT)
Dept: FAMILY MEDICINE CLINIC | Facility: CLINIC | Age: 67
End: 2022-01-14

## 2022-01-14 NOTE — TELEPHONE ENCOUNTER
Caller: Sahq Bailey    Relationship: Emergency Contact    Best call back number: [phone]    Who are you requesting to speak with (clinical staff, provider,  specific staff member): CLINICAL STAFF    What was the call regarding: SHAQ WOULD LIKE TO KNOW IF THE PATIENT NEEDS TO FAST FOR HIS NEXT APPOINTMENT WITH DR. NAYLOR SO SHE CAN SCHEDULE IT EARLY IN THE DAY IF NEEDED.     Do you require a callback: YES

## 2022-01-21 NOTE — TELEPHONE ENCOUNTER
Caller: Shaq Bailey    Relationship: Emergency Contact    Best call back number: [phone]    Who are you requesting to speak with (clinical staff, provider,  specific staff member): CLINICAL STAFF    What was the call regarding: SHAQ IS FOLLOWING UP ON HER REQUEST FOR A CALL TO LET HER KNOW IF THE PATIENT WILL NEED TO FAST FOR HIS NEXT APPOINTMENT WITH DR. NAYLOR.    Do you require a callback: YES

## 2022-01-27 NOTE — TELEPHONE ENCOUNTER
MRS. QUIROZ CALLED AGAIN.  ESTHER DOES NOT USE [employer].  PLEASE CALL HIM.  [phone].

## 2022-03-16 ENCOUNTER — OFFICE VISIT (OUTPATIENT)
Dept: FAMILY MEDICINE CLINIC | Facility: CLINIC | Age: 67
End: 2022-03-16

## 2022-03-16 VITALS
HEIGHT: 66 IN | SYSTOLIC BLOOD PRESSURE: 124 MMHG | TEMPERATURE: 97.7 F | WEIGHT: 146.8 LBS | HEART RATE: 87 BPM | DIASTOLIC BLOOD PRESSURE: 68 MMHG | BODY MASS INDEX: 23.59 KG/M2 | OXYGEN SATURATION: 97 %

## 2022-03-16 DIAGNOSIS — E78.00 PURE HYPERCHOLESTEROLEMIA: ICD-10-CM

## 2022-03-16 DIAGNOSIS — E11.649 UNCONTROLLED TYPE 2 DIABETES MELLITUS WITH HYPOGLYCEMIA WITHOUT COMA: Primary | ICD-10-CM

## 2022-03-16 DIAGNOSIS — I10 ESSENTIAL HYPERTENSION: ICD-10-CM

## 2022-03-16 LAB
EXPIRATION DATE: NORMAL
HBA1C MFR BLD: 5.3 %
Lab: NORMAL

## 2022-03-16 PROCEDURE — 83036 HEMOGLOBIN GLYCOSYLATED A1C: CPT | Performed by: FAMILY MEDICINE

## 2022-03-16 PROCEDURE — 99214 OFFICE O/P EST MOD 30 MIN: CPT | Performed by: FAMILY MEDICINE

## 2022-03-16 RX ORDER — FLASH GLUCOSE SENSOR
1 KIT MISCELLANEOUS
Qty: 6 EACH | Refills: 3 | Status: SHIPPED | OUTPATIENT
Start: 2022-03-16 | End: 2023-03-01

## 2022-03-16 RX ORDER — LISINOPRIL 10 MG/1
10 TABLET ORAL DAILY
Qty: 90 TABLET | Refills: 3 | Status: SHIPPED | OUTPATIENT
Start: 2022-03-16 | End: 2023-03-23 | Stop reason: SDUPTHER

## 2022-03-16 RX ORDER — INSULIN GLARGINE 100 [IU]/ML
30 INJECTION, SOLUTION SUBCUTANEOUS DAILY
Qty: 20 ML | Refills: 11 | Status: SHIPPED | OUTPATIENT
Start: 2022-03-16 | End: 2022-06-16 | Stop reason: SDUPTHER

## 2022-03-16 RX ORDER — FLASH GLUCOSE SCANNING READER
1 EACH MISCELLANEOUS
Qty: 6 EACH | Refills: 2 | Status: SHIPPED | OUTPATIENT
Start: 2022-03-16 | End: 2023-03-01

## 2022-03-16 RX ORDER — ROSUVASTATIN CALCIUM 40 MG/1
40 TABLET, COATED ORAL DAILY
Qty: 90 TABLET | Refills: 3 | Status: SHIPPED | OUTPATIENT
Start: 2022-03-16 | End: 2023-03-01 | Stop reason: SDUPTHER

## 2022-03-16 NOTE — PROGRESS NOTES
Follow Up Office Visit      Patient Name: Jose D Bailey  : 1955   MRN: [id number]     Chief Complaint:    Chief Complaint   Patient presents with   • Diabetes     F/u       History of Present Illness: Jose D Bailey is a 67 y.o. male who is here today to follow up with diabetes, hypertension hyperlipidemia.    Patient is doing well with his diabetes.  His blood sugars are actually running very low in the 40s and 70s in the morning.  We have been increasing his Trulicity and decreasing his insulin regimen.  He is down to 50 units.  Today we will discuss decreasing insulin further.  Patient would like to get the [employer] system for blood sugar checks.  He checks his blood sugar 4 times a day.    Patient's hypertension well controlled.  Hyperlipidemia well controlled.  Continue medications as prescribed.      Review of systems was positive for right ankle fracture    Physical exam: Patient wearing a boot today.  Patient's mood and affect was appropriate.    Subjective        I have reviewed and the following portions of the patient's history were updated as appropriate: past family history, past medical history, past social history, past surgical history and problem list.    Medications:     Current Outpatient Medications:   •  aspirin 81 MG tablet, Take 81 mg by mouth Daily., Disp: , Rfl:   •  bisoprolol (ZEBeta) 5 MG tablet, Take 1 tablet by mouth Daily., Disp: 90 tablet, Rfl: 3  •  Dulaglutide 0.75 MG/0.5ML solution pen-injector, Inject 0.75 mg under the skin into the appropriate area as directed 1 (One) Time Per Week., Disp: 2 mL, Rfl: 2  •  empagliflozin (Jardiance) 25 MG tablet tablet, Take 1 tablet by mouth Daily., Disp: 90 tablet, Rfl: 3  •  gabapentin (NEURONTIN) 600 MG tablet, Take 1 tablet by mouth 2 (Two) Times a Day., Disp: 180 tablet, Rfl: 1  •  glucose blood test strip, Check glucose twice a day., Disp: 50 each, Rfl: 7  •  glucose monitor monitoring kit, Check glucose twice a day., Disp: 1 each,  "Rfl: 0  •  Lantus 100 UNIT/ML injection, Inject 30 Units under the skin into the appropriate area as directed Daily., Disp: 20 mL, Rfl: 11  •  lisinopril (PRINIVIL,ZESTRIL) 10 MG tablet, Take 1 tablet by mouth Daily., Disp: 90 tablet, Rfl: 3  •  metFORMIN (Glucophage) 1000 MG tablet, Take 1 tablet by mouth 2 (Two) Times a Day With Meals., Disp: 180 tablet, Rfl: 1  •  rosuvastatin (CRESTOR) 40 MG tablet, Take 1 tablet by mouth Daily., Disp: 90 tablet, Rfl: 3  •  TRUEplus Insulin Syringe 31G X 5/16\" 1 ML misc, USE TO INJECT INSULIN ONCE DAILY, Disp: 100 each, Rfl: 2  •  Continuous Blood Gluc  (FreeStyle Mei 14 Day [place]) device, 1 each Every 14 (Fourteen) Days., Disp: 6 each, Rfl: 2  •  Continuous Blood Gluc Sensor (FreeStyle Mei 14 Day Sensor) misc, 1 each Every 14 (Fourteen) Days., Disp: 6 each, Rfl: 3    Allergies:   Allergies   Allergen Reactions   • Sulfa Antibiotics Hives   • Atorvastatin Myalgia   • Pravachol [Pravastatin Sodium] Myalgia   • Pravastatin Myalgia   • Simvastatin Myalgia   • Pseudoephedrine Rash       Objective     Physical Exam: Please see above  Vital Signs:   Vitals:    03/16/22 1026   BP: 124/68   Pulse: 87   Temp: 97.7 °F (36.5 °C)   TempSrc: Temporal   SpO2: 97%   Weight: 66.6 kg (146 lb 12.8 oz)   Height: 167.6 cm (66\")   PainSc: 0-No pain     Body mass index is 23.69 kg/m².          Assessment / Plan      Assessment/Plan:   Diagnoses and all orders for this visit:    1. Uncontrolled type 2 diabetes mellitus with hypoglycemia without coma (HCC) (Primary)  -     POC Glycosylated Hemoglobin (Hb A1C)  -     Lantus 100 UNIT/ML injection; Inject 30 Units under the skin into the appropriate area as directed Daily.  Dispense: 20 mL; Refill: 11  -     Continuous Blood Gluc Sensor (FreeStyle Mei 14 Day Sensor) misc; 1 each Every 14 (Fourteen) Days.  Dispense: 6 each; Refill: 3  -     Continuous Blood Gluc  (FreeStyle Mei 14 Day [place]) device; 1 each Every 14 (Fourteen) " Days.  Dispense: 6 each; Refill: 2  -     Dulaglutide 0.75 MG/0.5ML solution pen-injector; Inject 0.75 mg under the skin into the appropriate area as directed 1 (One) Time Per Week.  Dispense: 2 mL; Refill: 2  -     empagliflozin (Jardiance) 25 MG tablet tablet; Take 1 tablet by mouth Daily.  Dispense: 90 tablet; Refill: 3  -     metFORMIN (Glucophage) 1000 MG tablet; Take 1 tablet by mouth 2 (Two) Times a Day With Meals.  Dispense: 180 tablet; Refill: 1    2. Essential hypertension  -     lisinopril (PRINIVIL,ZESTRIL) 10 MG tablet; Take 1 tablet by mouth Daily.  Dispense: 90 tablet; Refill: 3    3. Pure hypercholesterolemia  -     rosuvastatin (CRESTOR) 40 MG tablet; Take 1 tablet by mouth Daily.  Dispense: 90 tablet; Refill: 3    Continue lisinopril and rosuvastatin.  Decrease insulin to 30 units.  Patient to monitor blood glucose and if over 200 after 2 weeks of insulin change, will increase Trulicity.  Otherwise follow-up in 3 months.    At next visit increase Trulicity.  Consider decreasing insulin further.    Follow Up:   No follow-ups on file.    Adalberto Albarado DO  [facility]

## 2022-03-25 ENCOUNTER — OFFICE VISIT (OUTPATIENT)
Dept: CARDIOLOGY | Facility: CLINIC | Age: 67
End: 2022-03-25

## 2022-03-25 VITALS
OXYGEN SATURATION: 96 % | HEART RATE: 86 BPM | BODY MASS INDEX: 23.95 KG/M2 | SYSTOLIC BLOOD PRESSURE: 100 MMHG | WEIGHT: 149 LBS | HEIGHT: 66 IN | DIASTOLIC BLOOD PRESSURE: 63 MMHG

## 2022-03-25 DIAGNOSIS — E78.00 PURE HYPERCHOLESTEROLEMIA: ICD-10-CM

## 2022-03-25 DIAGNOSIS — I11.9 HYPERTENSIVE HEART DISEASE WITHOUT HEART FAILURE: Primary | ICD-10-CM

## 2022-03-25 DIAGNOSIS — R55 VASOVAGAL SYNCOPE: ICD-10-CM

## 2022-03-25 DIAGNOSIS — E11.649 UNCONTROLLED TYPE 2 DIABETES MELLITUS WITH HYPOGLYCEMIA WITHOUT COMA: ICD-10-CM

## 2022-03-25 DIAGNOSIS — I10 ESSENTIAL HYPERTENSION: ICD-10-CM

## 2022-03-25 PROCEDURE — 99214 OFFICE O/P EST MOD 30 MIN: CPT | Performed by: INTERNAL MEDICINE

## 2022-03-25 RX ORDER — BISOPROLOL FUMARATE 5 MG/1
2.5 TABLET, FILM COATED ORAL DAILY
Qty: 45 TABLET | Refills: 3 | Status: SHIPPED | OUTPATIENT
Start: 2022-03-25 | End: 2022-11-14

## 2022-03-25 NOTE — PROGRESS NOTES
Subjective:     Encounter Date:03/25/2022    Patient ID: Jose D Bailey is a 67 y.o.  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       REFERRING PHYSICIAN: Clinton Day MD  CURRENT PHYSICIAN: Adalberto Albarado DO  NEUROLOGIST:  BRENDON Pagan   UROLOGIST: Dr. Morin  GASTROENTEROLOGIST: Ruperto Barroso MD  NEPHROLOGIST: [facility]  ORTHOPEDIC SURGEON: Roly Aguilera MD    Chief Complaint:   Chief Complaint   Patient presents with   • hypertensive heart disease without heart failure       Problem List:  1. Probable hypertensive cardiovascular disease:  a. Apparent remote screening 2D echocardiogram, data deficit ([facility]), approximately 2005.   b. Apparent unremarkable serial treadmill GXT/Cardiolite GXT, data deficit ([facility], serially every 3-4 years x14 years).   c. Abnormal screening treadmill GXT showing physical deconditioning (accelerated HR with 107% age-predicted MHR with 50% PEC) with post-exercise EKG changes in the absence of anginal type chest pain, as well as frequent PVCs/PACs during exercise without complex forms; without accelerated blood pressure ([facility]), 04/04/12.   d. Acceptable IV LEXIscan Cardiolite study, LVEF (0.68) with residual class I symptoms, June 2012.  e. Residual class I symptoms, July 2020, July 2021, March 2022  2. Hypertension with remote suboptimal control, improved.  3. Dyslipidemia, maintained on statin.   4. Uncontrolled type 2 insulin dependent diabetes (insulin dependent since 2009); diagnosed approximately 1998, hemoglobin A1c 8.5% (November 2016); hemoglobin A1c 9.2% (September 2017); hemoglobin A1c 7.3% (September 2018); 7.7% (May 2019), 7.1% May 2020, 7.7% July 2021.  5. Overweight, BMI 29.1  6. Bilateral plantar fasciitis; utilizing orthotics.   7. Cataract extraction (OD, 2009; OS, 2011).   8. 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 Slip and fall with right ankle fracture with surgical repair in [place], January 2022; data deficit.    Allergies   Allergen Reactions   • Sulfa Antibiotics Hives   • Atorvastatin Myalgia   • Pravachol [Pravastatin Sodium] Myalgia   • Pravastatin Myalgia   • Simvastatin Myalgia   • Pseudoephedrine Rash       Current Outpatient Medications:   •  aspirin 81 MG tablet, Take 81 mg by mouth Daily., Disp: , Rfl:   •  bisoprolol (ZEBeta) 5 MG tablet, Take 1 tablet by mouth Daily., Disp: 90 tablet, Rfl: 3  •  Continuous Blood Gluc  (FreeStyle Mei 14 Day [place]) device, 1 each Every 14 (Fourteen) Days., Disp: 6 each, Rfl: 2  •  Continuous Blood Gluc Sensor (FreeStyle Mei 14 Day Sensor) misc, 1 each Every 14 (Fourteen) Days., Disp: 6 each, Rfl: 3  •  Dulaglutide 0.75 MG/0.5ML solution pen-injector, Inject 0.75 mg under the skin into the appropriate area as directed 1 (One) Time Per Week., Disp: 2 mL, Rfl: 2  •  empagliflozin (Jardiance) 25 MG tablet tablet, Take 1 tablet by mouth Daily., Disp: 90 tablet, Rfl: 3  •  gabapentin (NEURONTIN) 600 MG tablet, Take 1 tablet by mouth 2 (Two) Times a Day., Disp: 180 tablet, Rfl: 1  •  glucose blood test strip, Check glucose twice a day., Disp: 50 each, Rfl: 7  •  glucose monitor monitoring kit, Check glucose twice a day., Disp: 1 each, Rfl: 0  •  Lantus 100 UNIT/ML injection, Inject 30 Units under the skin into the appropriate area as directed Daily., Disp: 20 mL, Rfl: 11  •   "lisinopril (PRINIVIL,ZESTRIL) 10 MG tablet, Take 1 tablet by mouth Daily., Disp: 90 tablet, Rfl: 3  •  metFORMIN (Glucophage) 1000 MG tablet, Take 1 tablet by mouth 2 (Two) Times a Day With Meals., Disp: 180 tablet, Rfl: 1  •  rosuvastatin (CRESTOR) 40 MG tablet, Take 1 tablet by mouth Daily., Disp: 90 tablet, Rfl: 3  •  TRUEplus Insulin Syringe 31G X 5/16\" 1 ML misc, USE TO INJECT INSULIN ONCE DAILY, Disp: 100 each, Rfl: 2    History of Present Illness: Patient returns for scheduled 8-month follow up. He slipped and fell on ice in January 2022 and fractured his right ankle in three placed. He had surgical repair with Dr. Aguilera in [place], [place]. He presents to the office today with a boot on his right lower leg and using a walker for ambulation. He has not returned to work and is instructed not to drive. He had follow up yesterday and was told he is \"80% healed\". He has otherwise been feeling well overall from a cardiovascular standpoint. Patient denies chest pain, shortness of breath, palpitations, edema, dizziness, and syncope. He was seen by [facility] last week and his blood pressure was 79/59. His blood pressure since then has been consistently around /50-60. He does not experience any lightheadedness. His laboratory studies were acceptable; data deficit. He has had no additional interim ER visits, hospitalizations, serious illnesses, or surgeries.      ROS   Obtained and negative except as outlined in problem list and HPI.    Procedures       Objective:       Vitals:    03/25/22 1134 03/25/22 1135   BP: 118/78 100/63   BP Location: Right arm Right arm   Patient Position: Sitting Standing   Pulse: 82 86   SpO2: 96% 96%   Weight: 67.6 kg (149 lb)    Height: 167.6 cm (66\")      Body mass index is 24.05 kg/m².  Last weight: 169 lbs    Vitals reviewed.   Constitutional:       Appearance: Well-developed.   Neck:      Thyroid: No thyromegaly.      Vascular: No carotid bruit or JVD.      " Lymphadenopathy: No cervical adenopathy.   Pulmonary:      Effort: Pulmonary effort is normal.      Breath sounds: Normal breath sounds. No wheezing. No rhonchi. No rales.   Cardiovascular:      Regular rhythm.      Murmurs: There is a grade 1/6 mid frequency midsystolic murmur at the URSB.      No gallop. No S3 gallop.   Pulses:     Dorsalis pedis: 2+ on the left side.     Posterior tibial: 2+ on the left side.  Edema:     Peripheral edema absent.   Abdominal:      Palpations: Abdomen is soft. There is no abdominal mass.      Tenderness: There is no abdominal tenderness.   Musculoskeletal: Normal range of motion. Skin:     General: Skin is warm and dry.      Findings: No rash.   Neurological:      Mental Status: Alert and oriented to person, place, and time.           Lab Review:   Lab Results   Component Value Date    GLUCOSE 149 (H) 04/06/2021    BUN 10 04/06/2021    CREATININE 1.22 04/06/2021    EGFRIFNONA 59 (L) 04/06/2021    BCR 8.2 04/06/2021     04/06/2021    K 4.6 04/06/2021     (H) 04/06/2021    CO2 24.6 04/06/2021    CALCIUM 9.4 04/06/2021    ALBUMIN 4.50 04/06/2021    AST 19 04/06/2021    ALT 20 04/06/2021       Lab Results   Component Value Date    HGBA1C 5.3 03/16/2022       Lab Results   Component Value Date    CHOL 126 04/06/2021    CHOL 128 02/27/2020    CHOL 131 02/20/2019     Lab Results   Component Value Date    TRIG 74 04/06/2021    TRIG 105 02/27/2020    TRIG 100 02/20/2019     Lab Results   Component Value Date    HDL 42 04/06/2021    HDL 41 02/27/2020    HDL 44 02/20/2019     Lab Results   Component Value Date    LDL 69 04/06/2021    LDL 66 02/27/2020    LDL 70 02/20/2019             Assessment:       Overall continued acceptable course with no new interim cardiopulmonary complaints with acceptable functional status. We will defer additional diagnostic or therapeutic intervention from a cardiac perspective at this time.     Diagnosis Plan   1. Hypertensive heart disease without  heart failure  No recurrent angina pectoris or CHF on current activity schedule; continue current treatment   2. Essential hypertension  Well controlled. Continue current treatment.   3. Pure hypercholesterolemia  Well controlled. Continue rosuvastatin.   4. Vasovagal syncope  No recurrences.   5. Uncontrolled type 2 diabetes mellitus with hypoglycemia without coma (HCC)  A1c 5.3% March 2022. Continue current treatment.          Plan:         1. Patient to continue current medications and close follow up with the above providers.  2. Decrease bisoprolol to 2.5 mg daily.  3. Tentative cardiology follow up in October 2022 or patient may return sooner PRN.    I, Aron Roth, attest that this documentation has been prepared under the direction and in the presence of Daquan Melgar MD 03/25/2022    I, Daquan Melgar MD, [facility], personally performed the services described in this documentation as scribed by the above named individual in my presence, and it is both accurate and complete. At 13:03 EDT on 03/25/2022

## 2022-04-28 ENCOUNTER — TELEPHONE (OUTPATIENT)
Dept: FAMILY MEDICINE CLINIC | Facility: CLINIC | Age: 67
End: 2022-04-28

## 2022-04-28 NOTE — TELEPHONE ENCOUNTER
"Caller: Jose D Bailey    Relationship: Self    Best call back number: [phone]    What medications are you currently taking:   Current Outpatient Medications on File Prior to Visit   Medication Sig Dispense Refill   • aspirin 81 MG tablet Take 81 mg by mouth Daily.     • bisoprolol (ZEBeta) 5 MG tablet Take 0.5 tablets by mouth Daily. 45 tablet 3   • Continuous Blood Gluc  (FreeStyle Mei 14 Day [place]) device 1 each Every 14 (Fourteen) Days. 6 each 2   • Continuous Blood Gluc Sensor (FreeStyle Mei 14 Day Sensor) misc 1 each Every 14 (Fourteen) Days. 6 each 3   • Dulaglutide 0.75 MG/0.5ML solution pen-injector Inject 0.75 mg under the skin into the appropriate area as directed 1 (One) Time Per Week. 2 mL 2   • empagliflozin (Jardiance) 25 MG tablet tablet Take 1 tablet by mouth Daily. 90 tablet 3   • gabapentin (NEURONTIN) 600 MG tablet Take 1 tablet by mouth 2 (Two) Times a Day. 180 tablet 1   • glucose blood test strip Check glucose twice a day. 50 each 7   • glucose monitor monitoring kit Check glucose twice a day. 1 each 0   • Lantus 100 UNIT/ML injection Inject 30 Units under the skin into the appropriate area as directed Daily. 20 mL 11   • lisinopril (PRINIVIL,ZESTRIL) 10 MG tablet Take 1 tablet by mouth Daily. 90 tablet 3   • metFORMIN (Glucophage) 1000 MG tablet Take 1 tablet by mouth 2 (Two) Times a Day With Meals. 180 tablet 1   • rosuvastatin (CRESTOR) 40 MG tablet Take 1 tablet by mouth Daily. 90 tablet 3   • TRUEplus Insulin Syringe 31G X 5/16\" 1 ML misc USE TO INJECT INSULIN ONCE DAILY 100 each 2     No current facility-administered medications on file prior to visit.       Which medication are you concerned about: TRULICITY     Who prescribed you this medication: DAYDAY     What are your concerns: MEDIATION NEEDS A PRE-AUTHORIZATION.  PATIENT WILL KARYN ON 5/1/22  "

## 2022-04-29 ENCOUNTER — OFFICE VISIT (OUTPATIENT)
Dept: NEUROLOGY | Facility: CLINIC | Age: 67
End: 2022-04-29

## 2022-04-29 VITALS
OXYGEN SATURATION: 98 % | WEIGHT: 146 LBS | BODY MASS INDEX: 23.46 KG/M2 | SYSTOLIC BLOOD PRESSURE: 126 MMHG | RESPIRATION RATE: 14 BRPM | TEMPERATURE: 97.4 F | DIASTOLIC BLOOD PRESSURE: 64 MMHG | HEIGHT: 66 IN | HEART RATE: 91 BPM

## 2022-04-29 DIAGNOSIS — G51.39 FACIAL SPASM: ICD-10-CM

## 2022-04-29 DIAGNOSIS — I69.90 LATE EFFECTS OF CVA (CEREBROVASCULAR ACCIDENT): ICD-10-CM

## 2022-04-29 DIAGNOSIS — E11.42 DIABETIC PERIPHERAL NEUROPATHY: ICD-10-CM

## 2022-04-29 DIAGNOSIS — G89.0 CENTRAL PAIN SYNDROME: Primary | ICD-10-CM

## 2022-04-29 PROCEDURE — 99214 OFFICE O/P EST MOD 30 MIN: CPT | Performed by: NURSE PRACTITIONER

## 2022-04-29 RX ORDER — GABAPENTIN 600 MG/1
600 TABLET ORAL 2 TIMES DAILY
Qty: 180 TABLET | Refills: 1 | Status: SHIPPED | OUTPATIENT
Start: 2022-04-29 | End: 2022-10-28 | Stop reason: SDUPTHER

## 2022-04-29 NOTE — PROGRESS NOTES
"Subjective:     Patient ID: Jose D Bailey is a 67 y.o. male.    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 He has continued to have alteration in sensation as far as temperatures and textures on the right side since his stroke especially in regards to softer objects. Hard objects are easier for him to hold. He does have some issues with fine motor & manual dexterity.    The patient has lost weight unintentionally. He states he sustained a fall after he slipped on a piece of ice on 01/08/2022 and fractured his right ankle in 3 places. He had to undergo surgery for the same by Dr. Aguilera and he states he has 9 screws and a plate in his right ankle. He states it has taken a long time for him to heal. He just got released to drive and was removed from the boot on 04/26/2022. He is ambulating with a cane. He has not attended physical therapy. The patient states that Dr. Aguilera explained that it may take approximately 1 year for his right ankle to completely heal. He reports some swelling in his right ankle.     The patient states he had 21 teeth removed in 12/2021 secondary to diabetic issues. He has been following a soft diet and is still in the process of obtaining his dentures. He notes that his bottom dentures will \"clip in,\" so they will have to put implants in. He reports that he has been eating baby food. The patient has lost 20 pounds since his last appointment.     The patient admits that his right-sided pain, numbness and tingling is still the same. He states he did not take any medication or aspirin when he had his right ankle set in the emergency room. He notes that Dr. Aguilera has been overly " conservative and cautious due to concerns that the patient might not know if he reinjured his foot secondary to the numbness. The patient takes gabapentin 600 mg twice daily. He notes that he changed insurance in 04/2022, so his next refill of gabapentin may require a prior authorization. He denies any falls since 01/08/2022. The patient reports that gabapentin provides good relief of his symptoms.     The patient states he is doing well with his diabetes. His most recent HbA1c was 5.3. He is currently taking Trulicity 0.75 mg. He states his physician is trying to wean him off of the Lantus. He has a follow-up for the same in 3 months.     The patient is currently taking over-the-counter aspirin 81 mg daily and rosuvastatin.     He follows up with Dr. Ady Norwood to monitor his kidneys due to his history of diabetes and he obtains labs every time he sees Dr. Norwood.     He will see Dr. [facility] as scheduled.    The patient reports that he receives eye injections every 4 weeks for left diabetic retinopathy. He states he is trying to maintain his 20/20 vision.      Prior workup:  He had EMG/NCVS 6/7/18 completed on all 4 extremities showing right median neuropathy at wrist but was otherwise normal. 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  He has had no recurrent stroke symptoms. History of left subthalamic lacunar stroke in November 2016 with normal MRI Brain. On aspirin and Crestor for 2nd stroke prevention.      Follows with [facility] for Left eye diabetic retinopathy and has injections every 4 weeks.    He does report some continued right hand pain and numbness and tingling in first through third fingers.  EMG and NCVS 2018 confirmed carpal tunnel syndrome.      He does continue to work on [employer] with an [employer] firm in [place], [place].     The following portions of the patient's history were  reviewed and updated as appropriate: allergies, current medications, past family history, past medical history, past social history, past surgical history and problem list.    Past Medical History:   Diagnosis Date   • Acute sinusitis    • Acute upper respiratory infection    • Cataract     Cataract extraction (OD, 2009;  OS, 2011).    • Chest pain    • Decreased ROM of left shoulder    • Diabetes mellitus (HCC)    • Diabetic retinopathy (HCC)    • Diabetic retinopathy associated with controlled type 2 diabetes mellitus (HCC) 2020    Dr. Salinas giving shots in eyes    • Dyslipidemia     Remained on statin   • ED (erectile dysfunction)    • H/O cardiovascular stress test     2012   • Herpes zoster    • Hyperlipidemia    • Hypertension    • Insulin dependent type 2 diabetes mellitus (HCC)     Type 2 insulin dependent diabetes (insulin dependent since 2009);  diagnosed approximately 1998.    • Junctional nevus of right forearm    • Nevus, atypical     Right arm   • Overweight     Mild overweight status, BMI 26.7.     • Plantar fasciitis     Bilateral plantar fasciitis;  utilizing orthotics.    • Quadriceps muscle strain    • Vasovagal syncope        Past Surgical History:   Procedure Laterality Date   • ANKLE SURGERY Right     broke ankle in 3 places   • ANKLE SURGERY  01/09/2022    @ [facility]   • CATARACT EXTRACTION      OS   • COLONOSCOPY      10/11/2010       Social History     Socioeconomic History   • Marital status:    Tobacco Use   • Smoking status: Never Smoker   • Smokeless tobacco: Never Used   Vaping Use   • Vaping Use: Never used   Substance and Sexual Activity   • Alcohol use: No   • Drug use: No   • Sexual activity: Defer     Partners: Female     Comment:        Family History   Problem Relation Age of Onset   • Stroke Mother    • Breast cancer Mother    • Cancer Father         lung    • Stroke Father    • Heart attack Father    • Other Father         CABG x6   • Coronary artery  "disease Father    • Diabetes Father    • Lung cancer Father    • Coronary artery disease Maternal Grandmother           Current Outpatient Medications:   •  aspirin 81 MG tablet, Take 81 mg by mouth Daily., Disp: , Rfl:   •  bisoprolol (ZEBeta) 5 MG tablet, Take 0.5 tablets by mouth Daily., Disp: 45 tablet, Rfl: 3  •  Continuous Blood Gluc  (FreeStyle Mei 14 Day [place]) device, 1 each Every 14 (Fourteen) Days., Disp: 6 each, Rfl: 2  •  Continuous Blood Gluc Sensor (FreeStyle Mei 14 Day Sensor) misc, 1 each Every 14 (Fourteen) Days., Disp: 6 each, Rfl: 3  •  Dulaglutide 0.75 MG/0.5ML solution pen-injector, Inject 0.75 mg under the skin into the appropriate area as directed 1 (One) Time Per Week., Disp: 2 mL, Rfl: 2  •  empagliflozin (Jardiance) 25 MG tablet tablet, Take 1 tablet by mouth Daily., Disp: 90 tablet, Rfl: 3  •  gabapentin (NEURONTIN) 600 MG tablet, Take 1 tablet by mouth 2 (Two) Times a Day., Disp: 180 tablet, Rfl: 1  •  glucose blood test strip, Check glucose twice a day., Disp: 50 each, Rfl: 7  •  glucose monitor monitoring kit, Check glucose twice a day., Disp: 1 each, Rfl: 0  •  Lantus 100 UNIT/ML injection, Inject 30 Units under the skin into the appropriate area as directed Daily., Disp: 20 mL, Rfl: 11  •  lisinopril (PRINIVIL,ZESTRIL) 10 MG tablet, Take 1 tablet by mouth Daily., Disp: 90 tablet, Rfl: 3  •  metFORMIN (Glucophage) 1000 MG tablet, Take 1 tablet by mouth 2 (Two) Times a Day With Meals., Disp: 180 tablet, Rfl: 1  •  rosuvastatin (CRESTOR) 40 MG tablet, Take 1 tablet by mouth Daily., Disp: 90 tablet, Rfl: 3  •  TRUEplus Insulin Syringe 31G X 5/16\" 1 ML misc, USE TO INJECT INSULIN ONCE DAILY, Disp: 100 each, Rfl: 2     Review of Systems   Constitutional: Negative for chills, fatigue, fever and unexpected weight change.   HENT: Negative for ear pain, hearing loss, nosebleeds, rhinorrhea and sore throat.    Eyes: Negative for photophobia, pain, discharge, itching and visual " "disturbance.   Respiratory: Negative for cough, chest tightness, shortness of breath and wheezing.    Cardiovascular: Negative for chest pain, palpitations and leg swelling.   Gastrointestinal: Negative for abdominal pain, blood in stool, constipation, diarrhea, nausea and vomiting.   Genitourinary: Negative for dysuria, frequency, hematuria and urgency.   Musculoskeletal: Positive for gait problem. Negative for arthralgias, back pain, joint swelling, myalgias, neck pain and neck stiffness.   Skin: Negative for rash and wound.   Allergic/Immunologic: Negative for environmental allergies and food allergies.   Neurological: Positive for numbness. Negative for dizziness, tremors, seizures, syncope, speech difficulty, weakness, light-headedness and headaches.   Hematological: Negative for adenopathy. Does not bruise/bleed easily.   Psychiatric/Behavioral: Negative for agitation, confusion, decreased concentration, hallucinations, sleep disturbance and suicidal ideas. The patient is not nervous/anxious.         Objective:  /64 (BP Location: Left arm, Patient Position: Sitting, Cuff Size: Adult)   Pulse 91   Temp 97.4 °F (36.3 °C) (Tympanic)   Resp 14   Ht 167.6 cm (65.98\")   Wt 66.2 kg (146 lb)   SpO2 98%   BMI 23.58 kg/m²     Neurologic Exam     Mental Status   Oriented to person, place, and time.   Attention: normal. Concentration: normal.   Speech: speech is normal   Level of consciousness: alert  Knowledge: good.     Cranial Nerves   Cranial nerves II through XII intact.     CN V   Right facial sensation deficit: forehead, cheeks and mandible (chronic)  He has a very minimal right facial droop, which is also chronic related to previous stroke.    He does have decreased vision in the left eye slightly and gets injections for diabetic retinopathy every 4 weeks with his eye specialist.      Motor Exam   Muscle bulk: normal  Overall muscle tone: normal    Strength   Strength 5/5 throughout.     Sensory Exam "   Right arm light touch: decreased from elbow  Right leg light touch: decreased from knee  Vibration normal.   Right arm pinprick: decreased from elbow  Right leg pinprick: decreased from knee  Chronic decreased sensation in right face, right arm, right flank, and right lower extremity, which is unchanged.     Gait, Coordination, and Reflexes     Gait  Gait: (Gait is antalgic using a cane for ambulation with recent right ankle fracture which is healing well.)    Coordination   Finger to nose coordination: normal  Heel to shin coordination: normal    Tremor   Resting tremor: absent  Intention tremor: absent  Action tremor: absent    Reflexes   Reflexes 2+ except as noted.   Right brachioradialis: 2+  Left brachioradialis: 2+  Right biceps: 2+  Left biceps: 2+  Right triceps: 2+  Left triceps: 2+  Right patellar: 1+  Left patellar: 2+  Right achilles: 1+  Left achilles: 2+      Physical Exam  Constitutional:       Appearance: Normal appearance.   Neurological:      Mental Status: He is alert and oriented to person, place, and time.      Coordination: Finger-Nose-Finger Test and Heel to Shin Test normal.      Deep Tendon Reflexes: Strength normal.      Reflex Scores:       Tricep reflexes are 2+ on the right side and 2+ on the left side.       Bicep reflexes are 2+ on the right side and 2+ on the left side.       Brachioradialis reflexes are 2+ on the right side and 2+ on the left side.       Patellar reflexes are 1+ on the right side and 2+ on the left side.       Achilles reflexes are 1+ on the right side and 2+ on the left side.  Psychiatric:         Mood and Affect: Mood and affect normal.         Speech: Speech normal.         Behavior: Behavior normal.         Thought Content: Thought content normal.         Cognition and Memory: Cognition and memory normal.         Judgment: Judgment normal.         Assessment/Plan:       Diagnoses and all orders for this visit:    1. Central pain syndrome (Primary)  -      gabapentin (NEURONTIN) 600 MG tablet; Take 1 tablet by mouth 2 (Two) Times a Day.  Dispense: 180 tablet; Refill: 1    2. Diabetic peripheral neuropathy (HCC)  -     gabapentin (NEURONTIN) 600 MG tablet; Take 1 tablet by mouth 2 (Two) Times a Day.  Dispense: 180 tablet; Refill: 1    3. Facial spasm  Comments:  Right. minimal.  Orders:  -     gabapentin (NEURONTIN) 600 MG tablet; Take 1 tablet by mouth 2 (Two) Times a Day.  Dispense: 180 tablet; Refill: 1    4. Late effects of CVA (cerebrovascular accident)    He will continue gabapentin 600 mg twice a day.     Controlled substance agreement is signed today.     He will continue his aspirin and statin therapy for secondary stroke prevention.     He will follow up in 6 months or sooner if needed.    Continue close monitoring with PCP and all specialists.     Reviewed medications, potential side effects and signs and symptoms to report. Discussed risk versus benefits of treatment plan with patient and/or family-including medications, labs and radiology that may be ordered. Addressed questions and concerns during visit. Patient and/or family verbalized understanding and agree with plan.    AS THE PROVIDER, I PERSONALLY WORE PPE DURING ENTIRE FACE TO FACE ENCOUNTER IN CLINIC WITH THE PATIENT. PATIENT ALSO WORE PPE DURING ENTIRE FACE TO FACE ENCOUNTER EXCEPT FOR A MAX OF 30 SECONDS DURING NEUROLOGICAL EVALUATION OF CRANIAL NERVES AND THEN MASK WAS PLACED BACK OVER PATIENT FACE FOR REMAINDER OF VISIT. I WASHED MY HANDS BEFORE AND AFTER VISIT.    As part of this patient's treatment plan I am prescribing controlled substances. The patient has been made aware of appropriate use of such medications, including potential risk of somnolence, limited ability to drive and/or work safely, and potential for dependence or overdose. It has also been made clear that these medications are for use by the patient only, without concomitant use of alcohol or other substances unless  prescribed. Keep out of reach of children.  Shoaib report has been reviewed. If this is going to be prescribed long term, [facility] Controlled Substance Agreement Contract has also been read and signed by patient and myself.    During this visit the following were done:  Labs Reviewed [x]    Labs Ordered []    Radiology Reports Reviewed []    Radiology Ordered []    PCP Records Reviewed [x]    Referring Provider Records Reviewed []    ER Records Reviewed []    Hospital Records Reviewed []    History Obtained From Family []    Radiology Images Reviewed []    Other Reviewed [x]   Reviewed recent cardiology notes, pcp notes, nephrology notes from June 2021  Records Requested []        Transcribed from ambient dictation for BRENDON Abrams by Tana Calixto.  04/29/22   15:06 EDT    Patient verbalized consent to the visit recording.  I have personally performed the services described in this document as transcribed by the above individual, and it is both accurate and complete.  BRENDON Abrams  4/29/2022  16:41 EDT

## 2022-05-26 RX ORDER — SYRINGE-NEEDLE,INSULIN,0.5 ML 27GX1/2"
1 SYRINGE, EMPTY DISPOSABLE MISCELLANEOUS DAILY
Qty: 100 EACH | Refills: 2 | Status: SHIPPED | OUTPATIENT
Start: 2022-05-26

## 2022-06-16 ENCOUNTER — LAB (OUTPATIENT)
Dept: LAB | Facility: HOSPITAL | Age: 67
End: 2022-06-16

## 2022-06-16 ENCOUNTER — OFFICE VISIT (OUTPATIENT)
Dept: FAMILY MEDICINE CLINIC | Facility: CLINIC | Age: 67
End: 2022-06-16

## 2022-06-16 VITALS
WEIGHT: 141.2 LBS | HEIGHT: 66 IN | DIASTOLIC BLOOD PRESSURE: 78 MMHG | TEMPERATURE: 97.3 F | OXYGEN SATURATION: 99 % | BODY MASS INDEX: 22.69 KG/M2 | SYSTOLIC BLOOD PRESSURE: 122 MMHG | HEART RATE: 87 BPM

## 2022-06-16 DIAGNOSIS — Z79.4 CONTROLLED TYPE 2 DIABETES MELLITUS WITHOUT COMPLICATION, WITH LONG-TERM CURRENT USE OF INSULIN: Primary | ICD-10-CM

## 2022-06-16 DIAGNOSIS — E78.00 PURE HYPERCHOLESTEROLEMIA: ICD-10-CM

## 2022-06-16 DIAGNOSIS — I10 ESSENTIAL HYPERTENSION: ICD-10-CM

## 2022-06-16 DIAGNOSIS — Z12.5 PROSTATE CANCER SCREENING: ICD-10-CM

## 2022-06-16 DIAGNOSIS — Z79.4 CONTROLLED TYPE 2 DIABETES MELLITUS WITHOUT COMPLICATION, WITH LONG-TERM CURRENT USE OF INSULIN: ICD-10-CM

## 2022-06-16 DIAGNOSIS — E11.9 CONTROLLED TYPE 2 DIABETES MELLITUS WITHOUT COMPLICATION, WITH LONG-TERM CURRENT USE OF INSULIN: Primary | ICD-10-CM

## 2022-06-16 DIAGNOSIS — E11.649 UNCONTROLLED TYPE 2 DIABETES MELLITUS WITH HYPOGLYCEMIA WITHOUT COMA: ICD-10-CM

## 2022-06-16 DIAGNOSIS — E11.9 CONTROLLED TYPE 2 DIABETES MELLITUS WITHOUT COMPLICATION, WITH LONG-TERM CURRENT USE OF INSULIN: ICD-10-CM

## 2022-06-16 LAB
ALBUMIN SERPL-MCNC: 4.8 G/DL (ref 3.5–5.2)
ALBUMIN/GLOB SERPL: 2.3 G/DL
ALP SERPL-CCNC: 89 U/L (ref 39–117)
ALT SERPL W P-5'-P-CCNC: 14 U/L (ref 1–41)
ANION GAP SERPL CALCULATED.3IONS-SCNC: 15.8 MMOL/L (ref 5–15)
AST SERPL-CCNC: 24 U/L (ref 1–40)
BILIRUB SERPL-MCNC: 0.6 MG/DL (ref 0–1.2)
BUN SERPL-MCNC: 12 MG/DL (ref 8–23)
BUN/CREAT SERPL: 12.2 (ref 7–25)
CALCIUM SPEC-SCNC: 10 MG/DL (ref 8.6–10.5)
CHLORIDE SERPL-SCNC: 100 MMOL/L (ref 98–107)
CHOLEST SERPL-MCNC: 115 MG/DL (ref 0–200)
CO2 SERPL-SCNC: 25.2 MMOL/L (ref 22–29)
CREAT SERPL-MCNC: 0.98 MG/DL (ref 0.76–1.27)
EGFRCR SERPLBLD CKD-EPI 2021: 84 ML/MIN/1.73
EXPIRATION DATE: NORMAL
GLOBULIN UR ELPH-MCNC: 2.1 GM/DL
GLUCOSE SERPL-MCNC: 138 MG/DL (ref 65–99)
HBA1C MFR BLD: 5.5 %
HDLC SERPL-MCNC: 46 MG/DL (ref 40–60)
LDLC SERPL CALC-MCNC: 53 MG/DL (ref 0–100)
LDLC/HDLC SERPL: 1.14 {RATIO}
Lab: NORMAL
POTASSIUM SERPL-SCNC: 4.4 MMOL/L (ref 3.5–5.2)
PROT SERPL-MCNC: 6.9 G/DL (ref 6–8.5)
PSA SERPL-MCNC: 1.85 NG/ML (ref 0–4)
SODIUM SERPL-SCNC: 141 MMOL/L (ref 136–145)
TRIGL SERPL-MCNC: 83 MG/DL (ref 0–150)
VLDLC SERPL-MCNC: 16 MG/DL (ref 5–40)

## 2022-06-16 PROCEDURE — G0103 PSA SCREENING: HCPCS

## 2022-06-16 PROCEDURE — 80053 COMPREHEN METABOLIC PANEL: CPT

## 2022-06-16 PROCEDURE — 80061 LIPID PANEL: CPT

## 2022-06-16 PROCEDURE — 99214 OFFICE O/P EST MOD 30 MIN: CPT | Performed by: FAMILY MEDICINE

## 2022-06-16 PROCEDURE — 36415 COLL VENOUS BLD VENIPUNCTURE: CPT

## 2022-06-16 PROCEDURE — 83036 HEMOGLOBIN GLYCOSYLATED A1C: CPT | Performed by: FAMILY MEDICINE

## 2022-06-16 RX ORDER — INSULIN GLARGINE 100 [IU]/ML
20 INJECTION, SOLUTION SUBCUTANEOUS DAILY
Qty: 20 ML | Refills: 11 | Status: SHIPPED | OUTPATIENT
Start: 2022-06-16 | End: 2023-03-01 | Stop reason: SDUPTHER

## 2022-06-16 NOTE — PROGRESS NOTES
Follow Up Office Visit      Patient Name: Jose D Bailey  : 1955   MRN: [id number]     Chief Complaint:    Chief Complaint   Patient presents with   • Diabetes     F/u        History of Present Illness: Jose D Bailey is a 67 y.o. male who is here today to follow up with diabetes that is very well controlled.  Patient reports blood sugars under 100 in the mornings.  He is on a medium dose of Trulicity, 30 units of insulin along with Jardiance and metformin.  Has lost more weight since he does not have teeth.  Blood pressure looks great today.  Hyperlipidemia has been controlled in the past needs repeat labs today.  We do need monitoring labs for his kidney function and liver function due to hyperlipidemia and blood pressure treatment.    Patient doing well and has concerned about the xiphoid process.  Has lost weight and notices protruding from his chest.      Review of systems was negative for dizziness and diaphoresis    Physical exam: Patient's heart and lung exam was normal without rales rhonchi's or murmurs.  Auscultation was clear bilaterally.      Subjective        I have reviewed and the following portions of the patient's history were updated as appropriate: past family history, past medical history, past social history, past surgical history and problem list.    Medications:     Current Outpatient Medications:   •  aspirin 81 MG tablet, Take 81 mg by mouth Daily., Disp: , Rfl:   •  bisoprolol (ZEBeta) 5 MG tablet, Take 0.5 tablets by mouth Daily., Disp: 45 tablet, Rfl: 3  •  Continuous Blood Gluc  (FreeStyle Mei 14 Day [place]) device, 1 each Every 14 (Fourteen) Days., Disp: 6 each, Rfl: 2  •  Continuous Blood Gluc Sensor (FreeStyle Mei 14 Day Sensor) misc, 1 each Every 14 (Fourteen) Days., Disp: 6 each, Rfl: 3  •  Dulaglutide 0.75 MG/0.5ML solution pen-injector, Inject 0.75 mg under the skin into the appropriate area as directed 1 (One) Time Per Week., Disp: 2 mL, Rfl: 2  •   "empagliflozin (Jardiance) 25 MG tablet tablet, Take 1 tablet by mouth Daily., Disp: 90 tablet, Rfl: 3  •  gabapentin (NEURONTIN) 600 MG tablet, Take 1 tablet by mouth 2 (Two) Times a Day., Disp: 180 tablet, Rfl: 1  •  glucose blood test strip, Check glucose twice a day., Disp: 50 each, Rfl: 7  •  glucose monitor monitoring kit, Check glucose twice a day., Disp: 1 each, Rfl: 0  •  Insulin Syringe-Needle U-100 (TRUEplus Insulin Syringe) 31G X 5/16\" 1 ML misc, 1 each by Other route Daily. use to inject insulin once daily, Disp: 100 each, Rfl: 2  •  Lantus 100 UNIT/ML injection, Inject 20 Units under the skin into the appropriate area as directed Daily., Disp: 20 mL, Rfl: 11  •  lisinopril (PRINIVIL,ZESTRIL) 10 MG tablet, Take 1 tablet by mouth Daily., Disp: 90 tablet, Rfl: 3  •  metFORMIN (Glucophage) 1000 MG tablet, Take 1 tablet by mouth 2 (Two) Times a Day With Meals., Disp: 180 tablet, Rfl: 1  •  rosuvastatin (CRESTOR) 40 MG tablet, Take 1 tablet by mouth Daily., Disp: 90 tablet, Rfl: 3    Allergies:   Allergies   Allergen Reactions   • Sulfa Antibiotics Hives   • Atorvastatin Myalgia   • Pravachol [Pravastatin Sodium] Myalgia   • Pravastatin Myalgia   • Simvastatin Myalgia   • Pseudoephedrine Rash       Objective     Physical Exam: Please see above  Vital Signs:   Vitals:    06/16/22 0855   BP: 122/78   Pulse: 87   Temp: 97.3 °F (36.3 °C)   TempSrc: Temporal   SpO2: 99%   Weight: 64 kg (141 lb 3.2 oz)   Height: 167.6 cm (66\")   PainSc: 0-No pain     Body mass index is 22.79 kg/m².          Assessment / Plan      Assessment/Plan:   Diagnoses and all orders for this visit:    1. Controlled type 2 diabetes mellitus without complication, with long-term current use of insulin (HCC) (Primary)  -     POC Glycosylated Hemoglobin (Hb A1C)  -     Comprehensive Metabolic Panel; Future    2. Essential hypertension    3. Pure hypercholesterolemia  -     Lipid Panel; Future    4. Prostate cancer screening  -     PSA SCREENING; " Future    5. Uncontrolled type 2 diabetes mellitus with hypoglycemia without coma (HCC)  -     Lantus 100 UNIT/ML injection; Inject 20 Units under the skin into the appropriate area as directed Daily.  Dispense: 20 mL; Refill: 11  -     Dulaglutide 0.75 MG/0.5ML solution pen-injector; Inject 0.75 mg under the skin into the appropriate area as directed 1 (One) Time Per Week.  Dispense: 2 mL; Refill: 2  -     metFORMIN (Glucophage) 1000 MG tablet; Take 1 tablet by mouth 2 (Two) Times a Day With Meals.  Dispense: 180 tablet; Refill: 1    Patient's diabetes very well controlled.  Decrease insulin to 20 units.  We will decrease further at next visit.  Patient okay to call if blood sugars remaining under 100 after this decrease.  Would take insulin down to 10 units if continue to have low blood sugars at home.  May increase Trulicity in the future.    Prostate cancer screening today.  Hypertension well controlled.  Continue lisinopril.    Hyperlipidemia has been controlled, continue Crestor.        Follow Up:   Return in about 4 months (around 10/16/2022) for Annual, Labs today.    Adalberto Albarado DO  [facility] [place]

## 2022-07-25 ENCOUNTER — LAB (OUTPATIENT)
Dept: LAB | Facility: HOSPITAL | Age: 67
End: 2022-07-25

## 2022-07-25 ENCOUNTER — CLINICAL SUPPORT (OUTPATIENT)
Dept: FAMILY MEDICINE CLINIC | Facility: CLINIC | Age: 67
End: 2022-07-25

## 2022-07-25 DIAGNOSIS — Z01.89 PATIENT REQUEST FOR DIAGNOSTIC TESTING: ICD-10-CM

## 2022-07-25 DIAGNOSIS — Z01.89 PATIENT REQUEST FOR DIAGNOSTIC TESTING: Primary | ICD-10-CM

## 2022-07-25 PROCEDURE — U0004 COV-19 TEST NON-CDC HGH THRU: HCPCS | Performed by: FAMILY MEDICINE

## 2022-07-26 ENCOUNTER — TELEPHONE (OUTPATIENT)
Dept: FAMILY MEDICINE CLINIC | Facility: CLINIC | Age: 67
End: 2022-07-26

## 2022-07-26 LAB — SARS-COV-2 RNA NOSE QL NAA+PROBE: DETECTED

## 2022-07-26 NOTE — TELEPHONE ENCOUNTER
Caller: Solomon Baileyfatmata    Relationship: Emergency Contact    Best call back number: [phone]    Caller requesting test results: WIFE    What test was performed: COVID 19    When was the test performed: YESTERDAY    Where was the test performed: AT OFFICE    Additional notes: SAW RESULTS IN MY CHART HOWEVER THEY DON'T UNDERSTAND AND WANTS TO SPEAK TO SOMEONE.

## 2022-09-23 ENCOUNTER — TELEPHONE (OUTPATIENT)
Dept: FAMILY MEDICINE CLINIC | Facility: CLINIC | Age: 67
End: 2022-09-23

## 2022-09-23 DIAGNOSIS — E11.649 UNCONTROLLED TYPE 2 DIABETES MELLITUS WITH HYPOGLYCEMIA WITHOUT COMA: ICD-10-CM

## 2022-09-23 NOTE — TELEPHONE ENCOUNTER
Caller: Jose D Bailey    Relationship: Self    Best call back number:     Requested Prescriptions:   TRULICITY (DIDN'T SEE THIS ON HIS LIST OF MEDICATIONS)     Pharmacy where request should be sent: [facility] [address] [place] [address] DR - [phone]  - [phone]     Additional details provided by patient:  PATIENT WILL BE OUT Sunday AND TAKES THIS ONCE A WEEK; THIS IS ALSO A WEEKEND      Does the patient have less than a 3 day supply:  [x] Yes  [] No    Karina Modi Rep   09/23/22 09:14 EDT

## 2022-09-27 ENCOUNTER — OFFICE VISIT (OUTPATIENT)
Dept: FAMILY MEDICINE CLINIC | Facility: CLINIC | Age: 67
End: 2022-09-27

## 2022-09-27 VITALS
HEART RATE: 72 BPM | WEIGHT: 132.2 LBS | TEMPERATURE: 97.7 F | SYSTOLIC BLOOD PRESSURE: 120 MMHG | HEIGHT: 66 IN | BODY MASS INDEX: 21.24 KG/M2 | OXYGEN SATURATION: 97 % | DIASTOLIC BLOOD PRESSURE: 66 MMHG

## 2022-09-27 DIAGNOSIS — Z00.00 ANNUAL PHYSICAL EXAM: Primary | ICD-10-CM

## 2022-09-27 DIAGNOSIS — E11.9 CONTROLLED TYPE 2 DIABETES MELLITUS WITHOUT COMPLICATION, WITH LONG-TERM CURRENT USE OF INSULIN: ICD-10-CM

## 2022-09-27 DIAGNOSIS — E11.9 ENCOUNTER FOR DIABETIC FOOT EXAM: ICD-10-CM

## 2022-09-27 DIAGNOSIS — R10.9 FLANK PAIN: ICD-10-CM

## 2022-09-27 DIAGNOSIS — Z79.4 CONTROLLED TYPE 2 DIABETES MELLITUS WITHOUT COMPLICATION, WITH LONG-TERM CURRENT USE OF INSULIN: ICD-10-CM

## 2022-09-27 LAB
EXPIRATION DATE: NORMAL
HBA1C MFR BLD: 6.3 %
Lab: NORMAL

## 2022-09-27 PROCEDURE — 83036 HEMOGLOBIN GLYCOSYLATED A1C: CPT | Performed by: FAMILY MEDICINE

## 2022-09-27 PROCEDURE — 99397 PER PM REEVAL EST PAT 65+ YR: CPT | Performed by: FAMILY MEDICINE

## 2022-09-27 NOTE — PROGRESS NOTES
"     Follow Up Office Visit      Patient Name: Jose D Bailey  : 1955   MRN: [id number]     Chief Complaint:    Chief Complaint   Patient presents with   • Annual Exam       History of Present Illness: Jose D Bailey is a 67 y.o. male who is here today to follow up with diabetes and hypertension.  Patient presents for annual exam.  Patient has controlled diabetes and complaint of right flank pain and is getting out of bed.  Flank pain is severe but resolves with a couple steps.  This started after having his stroke.  Patient's right side of his body is numb and he does not have great sensation.  Patient says it takes multiple steps before he can begin to walk \"right\".    For patient's annual exam we discussed diet, exercise, dental care, vaccination status, monitoring/screening labs.  Patient needs a microalbuminuria test but our lab is closed today.  Patient defers zoster vaccine and COVID booster.  Patient has PSA already ordered and prostate cancer screening was discussed earlier this year.      Review of systems was positive for right flank pain                Subjective        I have reviewed and the following portions of the patient's history were updated as appropriate: past family history, past medical history, past social history, past surgical history and problem list.    Medications:     Current Outpatient Medications:   •  aspirin 81 MG tablet, Take 81 mg by mouth Daily., Disp: , Rfl:   •  bisoprolol (ZEBeta) 5 MG tablet, Take 0.5 tablets by mouth Daily., Disp: 45 tablet, Rfl: 3  •  Dulaglutide 0.75 MG/0.5ML solution pen-injector, Inject 0.75 mg under the skin into the appropriate area as directed 1 (One) Time Per Week., Disp: 2 mL, Rfl: 2  •  empagliflozin (Jardiance) 25 MG tablet tablet, Take 1 tablet by mouth Daily., Disp: 90 tablet, Rfl: 3  •  gabapentin (NEURONTIN) 600 MG tablet, Take 1 tablet by mouth 2 (Two) Times a Day., Disp: 180 tablet, Rfl: 1  •  glucose blood test strip, Check glucose " "twice a day., Disp: 50 each, Rfl: 7  •  glucose monitor monitoring kit, Check glucose twice a day., Disp: 1 each, Rfl: 0  •  Insulin Syringe-Needle U-100 (TRUEplus Insulin Syringe) 31G X 5/16\" 1 ML misc, 1 each by Other route Daily. use to inject insulin once daily, Disp: 100 each, Rfl: 2  •  Lantus 100 UNIT/ML injection, Inject 20 Units under the skin into the appropriate area as directed Daily., Disp: 20 mL, Rfl: 11  •  lisinopril (PRINIVIL,ZESTRIL) 10 MG tablet, Take 1 tablet by mouth Daily., Disp: 90 tablet, Rfl: 3  •  metFORMIN (Glucophage) 1000 MG tablet, Take 1 tablet by mouth 2 (Two) Times a Day With Meals., Disp: 180 tablet, Rfl: 1  •  rosuvastatin (CRESTOR) 40 MG tablet, Take 1 tablet by mouth Daily., Disp: 90 tablet, Rfl: 3  •  Continuous Blood Gluc  (FreeStyle Mei 14 Day [place]) device, 1 each Every 14 (Fourteen) Days., Disp: 6 each, Rfl: 2  •  Continuous Blood Gluc Sensor (FreeStyle Mei 14 Day Sensor) misc, 1 each Every 14 (Fourteen) Days., Disp: 6 each, Rfl: 3    Allergies:   Allergies   Allergen Reactions   • Sulfa Antibiotics Hives   • Atorvastatin Myalgia   • Pravachol [Pravastatin Sodium] Myalgia   • Pravastatin Myalgia   • Simvastatin Myalgia   • Pseudoephedrine Rash       Objective     Physical Exam:   Physical Exam  Vitals reviewed.   Constitutional:       Appearance: Normal appearance.   HENT:      Nose: Nose normal.   Eyes:      Pupils: Pupils are equal, round, and reactive to light.   Cardiovascular:      Rate and Rhythm: Normal rate and regular rhythm.      Pulses: Normal pulses.           Dorsalis pedis pulses are 2+ on the right side and 2+ on the left side.      Heart sounds: No murmur heard.  Pulmonary:      Effort: Pulmonary effort is normal. No respiratory distress.      Breath sounds: Normal breath sounds.   Musculoskeletal:         General: No swelling.      Cervical back: Neck supple.      Right foot: Normal range of motion.      Left foot: Normal range of motion. " "  Feet:      Right foot:      Protective Sensation: 6 sites tested. 6 sites sensed.      Skin integrity: No skin breakdown.      Toenail Condition: Right toenails are abnormally thick.      Left foot:      Protective Sensation: 6 sites tested. 6 sites sensed.      Skin integrity: No skin breakdown.      Toenail Condition: Left toenails are abnormally thick. Fungal disease present.  Skin:     General: Skin is warm.      Capillary Refill: Capillary refill takes less than 2 seconds.   Neurological:      Mental Status: He is alert. Mental status is at baseline.   Psychiatric:         Mood and Affect: Mood normal.       Vital Signs:   Vitals:    09/27/22 0808   BP: 120/66   Pulse: 72   Temp: 97.7 °F (36.5 °C)   SpO2: 97%   Weight: 60 kg (132 lb 3.2 oz)   Height: 167.6 cm (66\")     Body mass index is 21.34 kg/m².          Assessment / Plan      Assessment/Plan:   Diagnoses and all orders for this visit:    1. Annual physical exam (Primary)    2. Controlled type 2 diabetes mellitus without complication, with long-term current use of insulin ([facility])  -     POC Glycosylated Hemoglobin (Hb A1C)    3. Encounter for diabetic foot exam (HCC)    4. Flank pain    Annual counseling: Counseled patient regards to diet, exercise, vaccination status.  Discussed prostate cancer screening.  Discussed monitoring labs and screening labs.  Patient deferred zoster shot and COVID booster.  Patient up-to-date with colorectal cancer screening.    For patient's right flank pain, recommend neurology follow-up.  If patient wants to go to physical therapy can call for physical therapy consult.    Follow Up:   Return in about 5 months (around 2/27/2023) for Recheck.    Adalberto Albarado DO  [facility]   "

## 2022-09-28 ENCOUNTER — OFFICE VISIT (OUTPATIENT)
Dept: CARDIOLOGY | Facility: CLINIC | Age: 67
End: 2022-09-28

## 2022-09-28 VITALS
WEIGHT: 133 LBS | DIASTOLIC BLOOD PRESSURE: 54 MMHG | SYSTOLIC BLOOD PRESSURE: 100 MMHG | OXYGEN SATURATION: 96 % | BODY MASS INDEX: 21.38 KG/M2 | HEART RATE: 74 BPM | HEIGHT: 66 IN

## 2022-09-28 DIAGNOSIS — R55 VASOVAGAL SYNCOPE: ICD-10-CM

## 2022-09-28 DIAGNOSIS — I11.9 HYPERTENSIVE HEART DISEASE WITHOUT HEART FAILURE: Primary | ICD-10-CM

## 2022-09-28 DIAGNOSIS — E78.00 PURE HYPERCHOLESTEROLEMIA: ICD-10-CM

## 2022-09-28 PROCEDURE — 99214 OFFICE O/P EST MOD 30 MIN: CPT | Performed by: NURSE PRACTITIONER

## 2022-10-28 ENCOUNTER — OFFICE VISIT (OUTPATIENT)
Dept: NEUROLOGY | Facility: CLINIC | Age: 67
End: 2022-10-28

## 2022-10-28 VITALS
BODY MASS INDEX: 21.05 KG/M2 | OXYGEN SATURATION: 97 % | HEIGHT: 66 IN | WEIGHT: 131 LBS | SYSTOLIC BLOOD PRESSURE: 120 MMHG | DIASTOLIC BLOOD PRESSURE: 70 MMHG | HEART RATE: 93 BPM

## 2022-10-28 DIAGNOSIS — M79.2: ICD-10-CM

## 2022-10-28 DIAGNOSIS — G89.0 CENTRAL PAIN SYNDROME: Primary | ICD-10-CM

## 2022-10-28 DIAGNOSIS — G51.39 FACIAL SPASM: ICD-10-CM

## 2022-10-28 DIAGNOSIS — M54.6 CHRONIC RIGHT-SIDED THORACIC BACK PAIN: ICD-10-CM

## 2022-10-28 DIAGNOSIS — G89.29 CHRONIC RIGHT-SIDED THORACIC BACK PAIN: ICD-10-CM

## 2022-10-28 DIAGNOSIS — I69.90 LATE EFFECTS OF CVA (CEREBROVASCULAR ACCIDENT): ICD-10-CM

## 2022-10-28 DIAGNOSIS — R26.9 GAIT DISTURBANCE: ICD-10-CM

## 2022-10-28 DIAGNOSIS — E11.42 DIABETIC PERIPHERAL NEUROPATHY: ICD-10-CM

## 2022-10-28 PROCEDURE — 99214 OFFICE O/P EST MOD 30 MIN: CPT | Performed by: NURSE PRACTITIONER

## 2022-10-28 RX ORDER — GABAPENTIN 600 MG/1
600 TABLET ORAL 2 TIMES DAILY
Qty: 180 TABLET | Refills: 1 | Status: SHIPPED | OUTPATIENT
Start: 2022-10-28 | End: 2022-11-14 | Stop reason: SDUPTHER

## 2022-10-28 RX ORDER — BACLOFEN 10 MG/1
10-20 TABLET ORAL 2 TIMES DAILY PRN
Qty: 60 TABLET | Refills: 1 | Status: SHIPPED | OUTPATIENT
Start: 2022-10-28 | End: 2023-03-23

## 2022-10-28 NOTE — PROGRESS NOTES
"Subjective:     Patient ID: Jose D Bailey is a 67 y.o. male.    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 He has been taking gabapentin 600 mg 1 pill twice a day for his symptoms. He signed his controlled substance agreement at last visit in clinic on 04/29/2022. He has had significant weight loss and difficulty with his diabetes medications per report. He is following with his primary care provider closely. He also follows with cardiology, and recently saw BRENDON Dinero, with [facility] cardiology on 09/28/2022. No changes were made at the time. He is here for follow-up and refills on medications.    Today, he reports that his neuropathy is the same. He states if he sits for a long period of time, he feels stiff on his right side. He notes his drive to work is 45 minutes long, and by the time he arrives, he often feels as though his right side is \"drunk\" and takes some time to \"get going,\" as he is so stiff and tight on his right side. He states his walking will also be staggered when he gets out of his car after a long drive. He describes the sensation as someone squeezing him, and not a stabbing pain. He reports he experiences the tightness and stiffness on his right side when he first wakes up in the morning, and notes it will take him some time to start moving, as the sensation often takes his breath away. He states after he gets up and starts moving, the stiffness begins to subside. He reports his primary care provider noted his right flank area is very tense and knotted. He reports some time ago, he went to take a step backwards, caught his right foot on a twig in a " parking lot, lost his balance, and fell into a sitting position onto the asphalt. He notes he did not fall backwards, and only lost his balance, but hit the asphalt hard. He states at the time, he did not think anything of the incident, but ever since then, his right-sided stiffness and tightness has increased. He states the tightness is located in the middle of his back, near his waist, and underneath his rib cage, right above his belt line. He notes the stiffness occurs on the entire right side of his body, from his knee, all the way up to his head.    He confirms his numbness has improved, and has not worsened since starting on gabapentin. He states he is unable to fill his 90-day prescription of gabapentin at his pharmacy, and is only able to refill 30 days at a time.     He notes since his stroke in 2016, he is unable to lay on his right side. He states he will experience rhinorrhea on his right side without realizing, as he is unable to feel this side of his face since his stroke. He states he has previously completed a course of physical therapy to work on his right-sided weakness.     He denies any history of cancer in himself. He does not have a pacemaker. He notes he does not have significant cardiac problems, apart from high blood pressure. He confirms he is not claustrophobic. He denies any trouble with swallowing.     He reports he had all of his teeth removed in 12/2021, and due to supply chain problems, did not get his new dentures until 07/2022. He states due to the delay in receiving his dentures, he was on a soft food diet for 6 months. He also notes he fractured his ankle in 01/2022.    He states he is unaware of any weight loss since his last visit. He was 141 pounds on 06/16/2022, and as of today, 10/28/2022, he is 131 pounds. He confirms he recently saw his primary care provider, Dr. Adalberot Albarado, and he is aware of his current weight.     Prior workup:  He had EMG/NCVS 6/7/18 completed on all  4 extremities showing right median neuropathy at wrist but was otherwise normal. 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  He has had no recurrent stroke symptoms. History of left subthalamic lacunar stroke in November 2016 with normal MRI Brain. On aspirin and Crestor for 2nd stroke prevention.      Has had chronic right-sided paresthesias with central pain syndrome following suspected left subthalamic lacunar stroke from 11/2016. He has continued to have alteration in sensation as far as temperatures and textures on the right side since his stroke especially in regards to softer objects. Hard objects are easier for him to hold. He does have some issues with fine motor & manual dexterity.     The patient has lost weight unintentionally. He states he sustained a fall after he slipped on a piece of ice on 01/08/2022 and fractured his right ankle in 3 places. He had to undergo surgery for the same by Dr. Aguilera and he states he has 9 screws and a plate in his right ankle.       Follows with [facility] for Left eye diabetic retinopathy and has injections every 4 weeks.     He does report some continued right hand pain and numbness and tingling in first through third fingers.  EMG and NCVS 2018 confirmed carpal tunnel syndrome.      He does continue to work on [employer] with an [employer] firm in [place], [place].     The patient is currently taking over-the-counter aspirin 81 mg daily and rosuvastatin.     Dr. Ady Norwood nephrology.    The patient states he had 21 teeth removed in 12/2021 secondary to diabetic issues.     Now has dentures.    The following portions of the patient's history were reviewed and updated as appropriate: allergies, current medications, past family history, past medical history, past social history, past surgical history and problem list.    Past Medical History:   Diagnosis Date   •  Acute sinusitis    • Acute upper respiratory infection    • Cataract     Cataract extraction (OD, 2009;  OS, 2011).    • Chest pain    • Decreased ROM of left shoulder    • Diabetes mellitus (HCC)    • Diabetic retinopathy (HCC)    • Diabetic retinopathy associated with controlled type 2 diabetes mellitus (HCC) 2020    Dr. Salinas giving shots in eyes    • Dyslipidemia     Remained on statin   • ED (erectile dysfunction)    • H/O cardiovascular stress test     2012   • Herpes zoster    • Hyperlipidemia    • Hypertension    • Insulin dependent type 2 diabetes mellitus (HCC)     Type 2 insulin dependent diabetes (insulin dependent since 2009);  diagnosed approximately 1998.    • Junctional nevus of right forearm    • Nevus, atypical     Right arm   • Overweight     Mild overweight status, BMI 26.7.     • Plantar fasciitis     Bilateral plantar fasciitis;  utilizing orthotics.    • Quadriceps muscle strain    • Vasovagal syncope        Past Surgical History:   Procedure Laterality Date   • ANKLE SURGERY Right     broke ankle in 3 places   • ANKLE SURGERY  01/09/2022    @ [facility]   • CATARACT EXTRACTION      OS   • COLONOSCOPY      10/11/2010       Social History     Socioeconomic History   • Marital status:    Tobacco Use   • Smoking status: Never   • Smokeless tobacco: Never   Vaping Use   • Vaping Use: Never used   Substance and Sexual Activity   • Alcohol use: No   • Drug use: No   • Sexual activity: Defer     Partners: Female     Comment:        Family History   Problem Relation Age of Onset   • Stroke Mother    • Breast cancer Mother    • Cancer Father         lung    • Stroke Father    • Heart attack Father    • Other Father         CABG x6   • Coronary artery disease Father    • Diabetes Father    • Lung cancer Father    • Coronary artery disease Maternal Grandmother           Current Outpatient Medications:   •  aspirin 81 MG tablet, Take 81 mg by mouth Daily., Disp: , Rfl:   •   "baclofen (LIORESAL) 10 MG tablet, Take 1-2 tablets by mouth 2 (Two) Times a Day As Needed for Muscle Spasms., Disp: 60 tablet, Rfl: 1  •  bisoprolol (ZEBeta) 5 MG tablet, Take 0.5 tablets by mouth Daily., Disp: 45 tablet, Rfl: 3  •  Continuous Blood Gluc  (FreeStyle Mei 14 Day [place]) device, 1 each Every 14 (Fourteen) Days., Disp: 6 each, Rfl: 2  •  Continuous Blood Gluc Sensor (FreeStyle Mei 14 Day Sensor) misc, 1 each Every 14 (Fourteen) Days., Disp: 6 each, Rfl: 3  •  Dulaglutide 0.75 MG/0.5ML solution pen-injector, Inject 0.75 mg under the skin into the appropriate area as directed 1 (One) Time Per Week., Disp: 2 mL, Rfl: 2  •  empagliflozin (Jardiance) 25 MG tablet tablet, Take 1 tablet by mouth Daily., Disp: 90 tablet, Rfl: 3  •  gabapentin (NEURONTIN) 600 MG tablet, Take 1 tablet by mouth 2 (Two) Times a Day., Disp: 180 tablet, Rfl: 1  •  glucose blood test strip, Check glucose twice a day., Disp: 50 each, Rfl: 7  •  glucose monitor monitoring kit, Check glucose twice a day., Disp: 1 each, Rfl: 0  •  Insulin Syringe-Needle U-100 (TRUEplus Insulin Syringe) 31G X 5/16\" 1 ML misc, 1 each by Other route Daily. use to inject insulin once daily (Patient taking differently: 1 each by Other route Daily. use to inject insulin once , 20 units), Disp: 100 each, Rfl: 2  •  Lantus 100 UNIT/ML injection, Inject 20 Units under the skin into the appropriate area as directed Daily., Disp: 20 mL, Rfl: 11  •  lisinopril (PRINIVIL,ZESTRIL) 10 MG tablet, Take 1 tablet by mouth Daily., Disp: 90 tablet, Rfl: 3  •  metFORMIN (Glucophage) 1000 MG tablet, Take 1 tablet by mouth 2 (Two) Times a Day With Meals., Disp: 180 tablet, Rfl: 1  •  rosuvastatin (CRESTOR) 40 MG tablet, Take 1 tablet by mouth Daily., Disp: 90 tablet, Rfl: 3     Review of Systems   Constitutional: Negative for chills, fatigue, fever and unexpected weight change.   HENT: Negative for ear pain, hearing loss, nosebleeds, rhinorrhea and sore throat.  " "  Eyes: Negative for photophobia, pain, discharge, itching and visual disturbance.   Respiratory: Negative for cough, chest tightness, shortness of breath and wheezing.    Cardiovascular: Negative for chest pain, palpitations and leg swelling.   Gastrointestinal: Negative for abdominal pain, blood in stool, constipation, diarrhea, nausea and vomiting.   Genitourinary: Negative for dysuria, frequency, hematuria and urgency.   Musculoskeletal: Positive for back pain and gait problem. Negative for arthralgias, joint swelling, myalgias, neck pain and neck stiffness.        Muscle spasm and tightening right mid back to right flank region   Skin: Negative for rash and wound.   Allergic/Immunologic: Negative for environmental allergies and food allergies.   Neurological: Positive for numbness. Negative for dizziness, tremors, seizures, syncope, speech difficulty, weakness, light-headedness and headaches.   Hematological: Negative for adenopathy. Does not bruise/bleed easily.   Psychiatric/Behavioral: Negative for agitation, confusion, decreased concentration, hallucinations, sleep disturbance and suicidal ideas. The patient is not nervous/anxious.    All other systems reviewed and are negative.       Objective:  /70   Pulse 93   Ht 167.6 cm (66\")   Wt 59.4 kg (131 lb)   SpO2 97%   BMI 21.14 kg/m²     Neurologic Exam     Mental Status   Oriented to person, place, and time.   Speech: speech is normal   Level of consciousness: alert    Cranial Nerves     CN II   Visual acuity: decreased    CN III, IV, VI   Pupils are equal, round, and reactive to light.  Extraocular motions are normal.     CN V   Right facial sensation deficit: complete (decreased right face chronic)  Left facial sensation deficit: none    CN VII   Right facial weakness: central (right face chronic post CVA)    CN VIII   CN VIII normal.     CN IX, X   CN IX normal.   CN X normal.     CN XI   CN XI normal.     CN XII   CN XII normal.     Motor Exam "   Muscle bulk: normal  Overall muscle tone: normal    Strength   Strength 5/5 throughout.        Sensory Exam   Right arm vibration: normal  Left arm vibration: normal  Right leg vibration: normal  Left leg vibration: normal    Chronic decreased sensation in right face, right arm, right flank, and right lower extremity, which is unchanged.      Gait, Coordination, and Reflexes     Gait  Gait: (mild antalgia with right foot chronic difficulties due to prior fracture, not requiring cane, reports a lot of stiffness in right knee to right thigh and all the way up to right face-all right sided)    Coordination   Finger to nose coordination: normal    Tremor   Resting tremor: absent  Intention tremor: absent  Action tremor: absent    Reflexes   Right brachioradialis: 2+  Left brachioradialis: 2+  Right biceps: 2+  Left biceps: 2+  Right patellar: 1+  Left patellar: 2+  Right achilles: 1+  Left achilles: 2+  Right : 2+  Left : 2+      Physical Exam  Constitutional:       Appearance: Normal appearance.   Eyes:      Extraocular Movements: EOM normal.      Pupils: Pupils are equal, round, and reactive to light.   Musculoskeletal:      Thoracic back: Spasms and tenderness present. No swelling, edema, deformity, signs of trauma, lacerations or bony tenderness. Decreased range of motion. No scoliosis.        Back:    Neurological:      Mental Status: He is alert and oriented to person, place, and time.      Motor: Motor strength is normal.      Coordination: Finger-Nose-Finger Test normal.      Deep Tendon Reflexes:      Reflex Scores:       Bicep reflexes are 2+ on the right side and 2+ on the left side.       Brachioradialis reflexes are 2+ on the right side and 2+ on the left side.       Patellar reflexes are 1+ on the right side and 2+ on the left side.       Achilles reflexes are 1+ on the right side and 2+ on the left side.  Psychiatric:         Speech: Speech normal.     Most recent hemoglobin A1c was 6.3 percent  with PCP.    Assessment/Plan:       Diagnoses and all orders for this visit:    1. Central pain syndrome (Primary)  -     gabapentin (NEURONTIN) 600 MG tablet; Take 1 tablet by mouth 2 (Two) Times a Day.  Dispense: 180 tablet; Refill: 1  -     Basic Metabolic Panel; Future    2. Diabetic peripheral neuropathy (HCC)  -     gabapentin (NEURONTIN) 600 MG tablet; Take 1 tablet by mouth 2 (Two) Times a Day.  Dispense: 180 tablet; Refill: 1  -     Basic Metabolic Panel; Future    3. Facial spasm  Comments:  Right. minimal.  Orders:  -     gabapentin (NEURONTIN) 600 MG tablet; Take 1 tablet by mouth 2 (Two) Times a Day.  Dispense: 180 tablet; Refill: 1    4. Neuralgia of right flank  Comments:  with muscle spasms, present since CVA, but worsening  Orders:  -     MRI Thoracic Spine Without Contrast; Future  -     baclofen (LIORESAL) 10 MG tablet; Take 1-2 tablets by mouth 2 (Two) Times a Day As Needed for Muscle Spasms.  Dispense: 60 tablet; Refill: 1  -     Ambulatory Referral to Physical Therapy Evaluate and treat, Neuro    5. Gait disturbance  -     MRI Brain With & Without Contrast; Future  -     MRI Thoracic Spine Without Contrast; Future  -     Ambulatory Referral to Physical Therapy Evaluate and treat, Neuro    6. Chronic right-sided thoracic back pain  -     MRI Thoracic Spine Without Contrast; Future  -     baclofen (LIORESAL) 10 MG tablet; Take 1-2 tablets by mouth 2 (Two) Times a Day As Needed for Muscle Spasms.  Dispense: 60 tablet; Refill: 1  -     Ambulatory Referral to Physical Therapy Evaluate and treat, Neuro    7. Late effects of CVA (cerebrovascular accident)  -     Ambulatory Referral to Physical Therapy Evaluate and treat, Neuro         We are going to order an MRI of the brain and the thoracic spine with him having more stiffness, muscle spasms, and pain in the thoracic spine region, along with some gait difficulties and ataxia after rising from sitting for a long period of time. We are going to order  some physical therapy as well to help with strengthening and stretching. He will be called to schedule these appointments. We are going to continue his gabapentin. I have added a low dose of baclofen to use as needed for the spasms he is having. He will continue his current medications. He will follow up as scheduled in clinic in 6 months or sooner if needed. We will call him with MRI results and any additional recommendations prior to follow up in clinic.    Reviewed medications, potential side effects and signs and symptoms to report. Discussed risk versus benefits of treatment plan with patient and/or family-including medications, labs and radiology that may be ordered. Addressed questions and concerns during visit. Patient and/or family verbalized understanding and agree with plan.    AS THE PROVIDER, I PERSONALLY WORE PPE DURING ENTIRE FACE TO FACE ENCOUNTER IN CLINIC WITH THE PATIENT. PATIENT ALSO WORE PPE DURING ENTIRE FACE TO FACE ENCOUNTER EXCEPT FOR A MAX OF 30 SECONDS DURING NEUROLOGICAL EVALUATION OF CRANIAL NERVES AND THEN MASK WAS PLACED BACK OVER PATIENT FACE FOR REMAINDER OF VISIT. I WASHED MY HANDS BEFORE AND AFTER VISIT.    As part of this patient's treatment plan I am prescribing controlled substances. The patient has been made aware of appropriate use of such medications, including potential risk of somnolence, limited ability to drive and/or work safely, and potential for dependence or overdose. It has also been made clear that these medications are for use by the patient only, without concomitant use of alcohol or other substances unless prescribed. Keep out of reach of children.  Shoaib report has been reviewed. If this is going to be prescribed long term, [facility] Controlled Substance Agreement Contract has also been read and signed by patient and myself.    During this visit the following were done:  Labs Reviewed [x]    Labs Ordered []    Radiology Reports Reviewed []    Radiology Ordered [x]     PCP Records Reviewed [x]    Referring Provider Records Reviewed []    ER Records Reviewed []    Hospital Records Reviewed []    History Obtained From Family []    Radiology Images Reviewed []    Other Reviewed [x]    Records Requested []      Transcribed from ambient dictation for BRENDON Abrams by Fariha Ambrose.  10/28/22   13:33 EDT    Patient or patient representative verbalized consent to the visit recording.  I have personally performed the services described in this document as transcribed by the above individual, and it is both accurate and complete.  BRENDON Abrams  10/31/2022  07:47 EDT

## 2022-11-14 DIAGNOSIS — G51.39 FACIAL SPASM: ICD-10-CM

## 2022-11-14 DIAGNOSIS — E11.42 DIABETIC PERIPHERAL NEUROPATHY: ICD-10-CM

## 2022-11-14 DIAGNOSIS — G89.0 CENTRAL PAIN SYNDROME: ICD-10-CM

## 2022-11-14 RX ORDER — GABAPENTIN 600 MG/1
600 TABLET ORAL 2 TIMES DAILY
Qty: 180 TABLET | Refills: 1 | Status: SHIPPED | OUTPATIENT
Start: 2022-11-14

## 2022-11-14 RX ORDER — BISOPROLOL FUMARATE 5 MG/1
2.5 TABLET, FILM COATED ORAL DAILY
Qty: 45 TABLET | Refills: 3 | Status: SHIPPED | OUTPATIENT
Start: 2022-11-14

## 2022-11-14 NOTE — TELEPHONE ENCOUNTER
Rx Refill Note  Requested Prescriptions     Pending Prescriptions Disp Refills   • gabapentin (NEURONTIN) 600 MG tablet 180 tablet 1     Sig: Take 1 tablet by mouth 2 (Two) Times a Day.      Last office visit with prescribing clinician: 10/28/2022      Next office visit with prescribing clinician: 4/28/2023            Dorothy Salas CMA  11/14/22, 14:54 EST

## 2022-11-14 NOTE — TELEPHONE ENCOUNTER
Caller: PATIENT    Relationship: SELF    Best call back number: [phone]  Requested Prescriptions:   Requested Prescriptions     Pending Prescriptions Disp Refills   • gabapentin (NEURONTIN) 600 MG tablet 180 tablet 1     Sig: Take 1 tablet by mouth 2 (Two) Times a Day.        Pharmacy where request should be sent:  LISTED    Additional details provided by patient: PATIENT WILL BE LEAVING TO GO OUT OF TOWN Friday AND WILL NEED THIS RX FILLED BEFORE HE LEAVES AS HE WILL RUN OUT OF MEDICATION WHILE HE WILL BE OUT OF TOWN.  THERE IS A REFILL LEFT, IT JUST NEEDS THE PROVIDER'S OKAY TO FILL EARLY.  PHARMACY AND INSURANCE HAS SAID IT IS OKAY WITH PROVIDER'S APPROVAL.      Does the patient have less than a 3 day supply:  [] Yes  [x] No    Karina Anderson Rep   11/14/22 14:22 EST

## 2022-11-14 NOTE — TELEPHONE ENCOUNTER
It is OK to fill the gabapentin early. Of note, I sent in the gabapentin on 10/28/2022 for 90 days to be filled-is there a reason they are not filling the 90 days-this would avoid having to ask for sooner fills. I want to make sure they fill the 90 days and yes it is ok to fill early this time. Thanks, BRENDON Serrano

## 2022-11-15 NOTE — TELEPHONE ENCOUNTER
I SPOKE WITH PHARMACY AND GAVE A VERBAL FOR AN EARLY REFILL ON PT GABAPENTIN HOWEVER DUE TO CONTROLLED SUBSTANCE THEY DO NOT DO 90 DAY SUPPLIES

## 2022-12-07 ENCOUNTER — HOSPITAL ENCOUNTER (OUTPATIENT)
Dept: MRI IMAGING | Facility: HOSPITAL | Age: 67
Discharge: HOME | End: 2022-12-07

## 2022-12-07 ENCOUNTER — TELEPHONE (OUTPATIENT)
Dept: NEUROLOGY | Facility: CLINIC | Age: 67
End: 2022-12-07

## 2022-12-07 DIAGNOSIS — G89.29 CHRONIC RIGHT-SIDED THORACIC BACK PAIN: ICD-10-CM

## 2022-12-07 DIAGNOSIS — R26.9 GAIT DISTURBANCE: ICD-10-CM

## 2022-12-07 DIAGNOSIS — M79.2: ICD-10-CM

## 2022-12-07 DIAGNOSIS — M54.6 CHRONIC RIGHT-SIDED THORACIC BACK PAIN: ICD-10-CM

## 2022-12-07 LAB — CREAT BLDA-MCNC: 1.1 MG/DL (ref 0.6–1.3)

## 2022-12-07 PROCEDURE — 82565 ASSAY OF CREATININE: CPT

## 2022-12-07 PROCEDURE — 0 GADOBENATE DIMEGLUMINE 529 MG/ML SOLUTION: Performed by: NURSE PRACTITIONER

## 2022-12-07 PROCEDURE — 72146 MRI CHEST SPINE W/O DYE: CPT

## 2022-12-07 PROCEDURE — A9577 INJ MULTIHANCE: HCPCS | Performed by: NURSE PRACTITIONER

## 2022-12-07 PROCEDURE — 70553 MRI BRAIN STEM W/O & W/DYE: CPT

## 2022-12-07 RX ADMIN — GADOBENATE DIMEGLUMINE 12 ML: 529 INJECTION, SOLUTION INTRAVENOUS at 09:47

## 2022-12-07 NOTE — TELEPHONE ENCOUNTER
----- Message from BRENDON Abrams sent at 12/7/2022  1:03 PM EST -----  Please notify Mr. Bailey that the MRI of his brain is stable compared to prior imaging from 2016.  MRI of the thoracic spine shows some mild scoliosis but no other abnormalities.  We will follow-up as scheduled in clinic.  Thanks, BRENDON Serrano.

## 2022-12-07 NOTE — PROGRESS NOTES
Please notify Mr. Bailey that the MRI of his brain is stable compared to prior imaging from 2016.  MRI of the thoracic spine shows some mild curvature of the spine but no other abnormalities.  We will follow-up as scheduled in clinic.  Thanks, BRENDON Serrano.

## 2022-12-07 NOTE — TELEPHONE ENCOUNTER
----- Message from BRENDON Abrams sent at 12/7/2022  1:03 PM EST -----  Please notify Mr. Bailey that the MRI of his brain is stable compared to prior imaging from 2016.  MRI of the thoracic spine shows some mild curvature of the spine but no other abnormalities.  We will follow-up as scheduled in clinic.  Thanks, BRENDON Serrano.

## 2022-12-07 NOTE — PROGRESS NOTES
Please notify Mr. Bailey that the MRI of his brain is stable compared to prior imaging from 2016.  MRI of the thoracic spine shows some mild scoliosis but no other abnormalities.  We will follow-up as scheduled in clinic.  Thanks, BRENDON Serrano.

## 2022-12-12 DIAGNOSIS — E11.649 UNCONTROLLED TYPE 2 DIABETES MELLITUS WITH HYPOGLYCEMIA WITHOUT COMA: ICD-10-CM

## 2022-12-12 NOTE — TELEPHONE ENCOUNTER
Caller: Jose D Bailey    Relationship: Self    Best call back number: [phone]    Requested Prescriptions:   Requested Prescriptions     Pending Prescriptions Disp Refills   • Dulaglutide 0.75 MG/0.5ML solution pen-injector 2 mL 2     Sig: Inject 0.75 mg under the skin into the appropriate area as directed 1 (One) Time Per Week.        Pharmacy where request should be sent: [facility] [id number] [address] - [phone] [facility] [phone] FX     Additional details provided by patient: PATIENT HAS CALLED REQUESTING  A NEW PRESCRIPTION ON ABOVE MEDICATION    Does the patient have less than a 3 day supply:  [] Yes  [x] No    Would you like a call back once the refill request has been completed: [] Yes [x] No    If the office needs to give you a call back, can they leave a voicemail: [] Yes [x] No    Katherine Guerrero   12/12/22 13:41 EST

## 2023-03-01 ENCOUNTER — OFFICE VISIT (OUTPATIENT)
Dept: FAMILY MEDICINE CLINIC | Facility: CLINIC | Age: 68
End: 2023-03-01
Payer: COMMERCIAL

## 2023-03-01 VITALS
WEIGHT: 125.2 LBS | TEMPERATURE: 97.7 F | OXYGEN SATURATION: 97 % | DIASTOLIC BLOOD PRESSURE: 66 MMHG | SYSTOLIC BLOOD PRESSURE: 118 MMHG | BODY MASS INDEX: 20.12 KG/M2 | HEART RATE: 77 BPM | HEIGHT: 66 IN

## 2023-03-01 DIAGNOSIS — E11.9 CONTROLLED TYPE 2 DIABETES MELLITUS WITHOUT COMPLICATION, WITH LONG-TERM CURRENT USE OF INSULIN: ICD-10-CM

## 2023-03-01 DIAGNOSIS — E78.00 PURE HYPERCHOLESTEROLEMIA: ICD-10-CM

## 2023-03-01 DIAGNOSIS — Z79.4 CONTROLLED TYPE 2 DIABETES MELLITUS WITHOUT COMPLICATION, WITH LONG-TERM CURRENT USE OF INSULIN: ICD-10-CM

## 2023-03-01 LAB
EXPIRATION DATE: NORMAL
HBA1C MFR BLD: 5.6 %
Lab: NORMAL

## 2023-03-01 PROCEDURE — 99214 OFFICE O/P EST MOD 30 MIN: CPT | Performed by: FAMILY MEDICINE

## 2023-03-01 PROCEDURE — 83036 HEMOGLOBIN GLYCOSYLATED A1C: CPT | Performed by: FAMILY MEDICINE

## 2023-03-01 RX ORDER — ROSUVASTATIN CALCIUM 20 MG/1
TABLET, COATED ORAL DAILY
COMMUNITY
End: 2023-03-01

## 2023-03-01 RX ORDER — GABAPENTIN 100 MG/1
CAPSULE ORAL 3 TIMES DAILY
COMMUNITY
End: 2023-03-01 | Stop reason: ALTCHOICE

## 2023-03-01 RX ORDER — INSULIN GLARGINE 100 [IU]/ML
10 INJECTION, SOLUTION SUBCUTANEOUS DAILY
Qty: 9 ML | Refills: 0 | Status: SHIPPED | OUTPATIENT
Start: 2023-03-01 | End: 2023-05-30

## 2023-03-01 RX ORDER — BISOPROLOL FUMARATE 10 MG/1
TABLET, FILM COATED ORAL DAILY
COMMUNITY

## 2023-03-01 RX ORDER — ROSUVASTATIN CALCIUM 40 MG/1
40 TABLET, COATED ORAL DAILY
Qty: 90 TABLET | Refills: 3 | Status: SHIPPED | OUTPATIENT
Start: 2023-03-01

## 2023-03-01 RX ORDER — BACLOFEN 10 MG/1
TABLET ORAL 3 TIMES DAILY
COMMUNITY
End: 2023-03-01

## 2023-03-01 NOTE — PROGRESS NOTES
Follow Up Office Visit      Patient Name: Jose D Bailey  : 1955   MRN: [id number]     Chief Complaint:    Chief Complaint   Patient presents with   • Diabetes       History of Present Illness: Jose D Bailey is a 67 y.o. male who is here today to follow up with diabetes that has improved substantially since last visit.  He is on 20 units of insulin along with Trulicity, metformin and Jardiance.  Denies any side effects.  Patient is here for follow-up regarding this and he has no issues today.  Patient has hyperlipidemia and this is treated with Crestor.  He is doing well with this treatment currently    Review of systems was negative for swelling, chest pain    Physical exam: Patient's mood and affect is appropriate.  Patient's heart exam RRR.  Patient lung exam CTA bilaterally.      Subjective        I have reviewed and the following portions of the patient's history were updated as appropriate: past family history, past medical history, past social history, past surgical history and problem list.    Medications:     Current Outpatient Medications:   •  aspirin 81 MG tablet, Take 1 tablet by mouth Daily., Disp: , Rfl:   •  baclofen (LIORESAL) 10 MG tablet, Take 1-2 tablets by mouth 2 (Two) Times a Day As Needed for Muscle Spasms., Disp: 60 tablet, Rfl: 1  •  bisoprolol (ZEBeta) 10 MG tablet, Take  by mouth Daily., Disp: , Rfl:   •  bisoprolol (ZEBeta) 5 MG tablet, Take 0.5 tablets by mouth Daily., Disp: 45 tablet, Rfl: 3  •  Dulaglutide 0.75 MG/0.5ML solution pen-injector, Inject 0.75 mg under the skin into the appropriate area as directed 1 (One) Time Per Week., Disp: 2 mL, Rfl: 2  •  empagliflozin (Jardiance) 25 MG tablet tablet, Take 1 tablet by mouth Daily., Disp: 90 tablet, Rfl: 3  •  gabapentin (NEURONTIN) 600 MG tablet, Take 1 tablet by mouth 2 (Two) Times a Day., Disp: 180 tablet, Rfl: 1  •  glucose blood test strip, Check glucose twice a day., Disp: 50 each, Rfl: 7  •  glucose monitor monitoring  "kit, Check glucose twice a day., Disp: 1 each, Rfl: 0  •  Insulin Syringe-Needle U-100 (TRUEplus Insulin Syringe) 31G X 5/16\" 1 ML misc, 1 each by Other route Daily. use to inject insulin once daily (Patient taking differently: 1 each by Other route Daily. use to inject insulin once , 20 units), Disp: 100 each, Rfl: 2  •  Lantus 100 UNIT/ML injection, Inject 10 Units under the skin into the appropriate area as directed Daily for 90 days., Disp: 9 mL, Rfl: 0  •  lisinopril (PRINIVIL,ZESTRIL) 10 MG tablet, Take 1 tablet by mouth Daily., Disp: 90 tablet, Rfl: 3  •  metFORMIN (Glucophage) 1000 MG tablet, Take 1 tablet by mouth 2 (Two) Times a Day With Meals., Disp: 180 tablet, Rfl: 1  •  rosuvastatin (CRESTOR) 40 MG tablet, Take 1 tablet by mouth Daily., Disp: 90 tablet, Rfl: 3    Allergies:   Allergies   Allergen Reactions   • Sulfa Antibiotics Hives   • Atorvastatin Myalgia   • Pravachol [Pravastatin Sodium] Myalgia   • Pravastatin Myalgia   • Simvastatin Myalgia   • Pseudoephedrine Rash       Objective     Physical Exam: Please see above  Vital Signs:   Vitals:    03/01/23 1102   BP: 118/66   Pulse: 77   Temp: 97.7 °F (36.5 °C)   SpO2: 97%   Weight: 56.8 kg (125 lb 3.2 oz)   Height: 167.6 cm (66\")     Body mass index is 20.21 kg/m².          Assessment / Plan      Assessment/Plan:   Diagnoses and all orders for this visit:    1. Controlled type 2 diabetes mellitus without complication, with long-term current use of insulin (HCC)  -     Dulaglutide 0.75 MG/0.5ML solution pen-injector; Inject 0.75 mg under the skin into the appropriate area as directed 1 (One) Time Per Week.  Dispense: 2 mL; Refill: 2  -     metFORMIN (Glucophage) 1000 MG tablet; Take 1 tablet by mouth 2 (Two) Times a Day With Meals.  Dispense: 180 tablet; Refill: 1  -     Lantus 100 UNIT/ML injection; Inject 10 Units under the skin into the appropriate area as directed Daily for 90 days.  Dispense: 9 mL; Refill: 0  -     empagliflozin (Jardiance) 25 " MG tablet tablet; Take 1 tablet by mouth Daily.  Dispense: 90 tablet; Refill: 3  -     POC Glycosylated Hemoglobin (Hb A1C)    2. Pure hypercholesterolemia  -     rosuvastatin (CRESTOR) 40 MG tablet; Take 1 tablet by mouth Daily.  Dispense: 90 tablet; Refill: 3    Patient's diabetes is controlled.  Decrease insulin to 10 units daily.  Continue current regimen otherwise.  Goal is to wean patient off of Lantus as tolerated.  May increase Trulicity or switch Trulicity to Ozempic in the future if needed    Continue Crestor for hyperlipidemia.    Chronic conditions evaluated and managed today.  Level 4 visit    Follow Up:   Return in about 3 months (around 6/1/2023) for Recheck.    Adalberto Albarado DO  [facility] [place]

## 2023-03-23 DIAGNOSIS — M79.2: ICD-10-CM

## 2023-03-23 DIAGNOSIS — M54.6 CHRONIC RIGHT-SIDED THORACIC BACK PAIN: ICD-10-CM

## 2023-03-23 DIAGNOSIS — I10 ESSENTIAL HYPERTENSION: ICD-10-CM

## 2023-03-23 DIAGNOSIS — G89.29 CHRONIC RIGHT-SIDED THORACIC BACK PAIN: ICD-10-CM

## 2023-03-23 RX ORDER — LISINOPRIL 10 MG/1
10 TABLET ORAL DAILY
Qty: 90 TABLET | Refills: 3 | Status: SHIPPED | OUTPATIENT
Start: 2023-03-23

## 2023-03-23 RX ORDER — BACLOFEN 10 MG/1
TABLET ORAL
Qty: 60 TABLET | Refills: 1 | Status: SHIPPED | OUTPATIENT
Start: 2023-03-23

## 2023-03-23 NOTE — TELEPHONE ENCOUNTER
Rx Refill Note  Requested Prescriptions     Pending Prescriptions Disp Refills   • baclofen (LIORESAL) 10 MG tablet [Pharmacy Med Name: BACLOFEN 10 MG TABLET] 60 tablet 1     Sig: TO1 TO 2 TABLETS BY MOUTH TWO TIMES A DAY AS NEEDED FOR MUSCLE SPASMS      Last office visit with prescribing clinician: 10/28/2022   Last telemedicine visit with prescribing clinician: 4/28/2023   Next office visit with prescribing clinician: 4/28/2023                         Would you like a call back once the refill request has been completed: [] Yes [] No    If the office needs to give you a call back, can they leave a voicemail: [] Yes [] No    Dorothy Salas, CHELSIE  03/23/23, 10:08 EDT

## 2023-03-23 NOTE — TELEPHONE ENCOUNTER
Caller: Leo Jose D JEAN    Relationship: Self    Best call back number: [phone]    Requested Prescriptions:   Requested Prescriptions     Pending Prescriptions Disp Refills   • lisinopril (PRINIVIL,ZESTRIL) 10 MG tablet 90 tablet 3     Sig: Take 1 tablet by mouth Daily.        Pharmacy where request should be sent:      [facility] [id number] [address] - [phone]  - [phone] FX        Last office visit with prescribing clinician: 3/1/2023   Last telemedicine visit with prescribing clinician: 6/7/2023   Next office visit with prescribing clinician: 6/7/2023     Additional details provided by patient:     Does the patient have less than a 3 day supply:  [x] Yes  [] No    Would you like a call back once the refill request has been completed: [] Yes [x] No    If the office needs to give you a call back, can they leave a voicemail: [] Yes [x] No    Karina Borjas Rep   03/23/23 10:22 EDT

## 2023-04-04 ENCOUNTER — PRIOR AUTHORIZATION (OUTPATIENT)
Dept: FAMILY MEDICINE CLINIC | Facility: CLINIC | Age: 68
End: 2023-04-04
Payer: COMMERCIAL

## 2023-04-04 NOTE — TELEPHONE ENCOUNTER
PRIOR AUTH STARTED  04/04/2023    Key: [id number] -  PA Case ID: [id number]    Drug  Trulicity 0.75MG/0.5ML pen-injectors

## 2023-04-17 ENCOUNTER — TELEPHONE (OUTPATIENT)
Dept: FAMILY MEDICINE CLINIC | Facility: CLINIC | Age: 68
End: 2023-04-17
Payer: COMMERCIAL

## 2023-04-19 ENCOUNTER — OFFICE VISIT (OUTPATIENT)
Dept: CARDIOLOGY | Facility: CLINIC | Age: 68
End: 2023-04-19
Payer: COMMERCIAL

## 2023-04-19 VITALS
OXYGEN SATURATION: 97 % | HEART RATE: 98 BPM | SYSTOLIC BLOOD PRESSURE: 116 MMHG | DIASTOLIC BLOOD PRESSURE: 73 MMHG | HEIGHT: 66 IN | BODY MASS INDEX: 19.7 KG/M2 | WEIGHT: 122.6 LBS

## 2023-04-19 DIAGNOSIS — R55 VASOVAGAL SYNCOPE: ICD-10-CM

## 2023-04-19 DIAGNOSIS — I11.9 HYPERTENSIVE HEART DISEASE WITHOUT HEART FAILURE: Primary | ICD-10-CM

## 2023-04-19 DIAGNOSIS — E78.5 DYSLIPIDEMIA: Chronic | ICD-10-CM

## 2023-04-19 DIAGNOSIS — R73.03 PREDIABETES: ICD-10-CM

## 2023-04-19 PROCEDURE — 93000 ELECTROCARDIOGRAM COMPLETE: CPT | Performed by: NURSE PRACTITIONER

## 2023-04-19 PROCEDURE — 99214 OFFICE O/P EST MOD 30 MIN: CPT | Performed by: NURSE PRACTITIONER

## 2023-04-19 RX ORDER — ASPIRIN 81 MG/1
81 TABLET ORAL DAILY
COMMUNITY
End: 2023-04-19 | Stop reason: SDUPTHER

## 2023-04-19 NOTE — PROGRESS NOTES
Subjective:     Encounter Date:04/19/2023      Patient ID: Jose D Bailey is a 68 y.o.   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       REFERRING PHYSICIAN: Clinton Day MD  CURRENT PHYSICIAN: Adalberto Albarado DO  NEUROLOGIST:  BRENDON Pagan   UROLOGIST: Dr. Morin  GASTROENTEROLOGIST: Ruperto Barroso MD  NEPHROLOGIST: [facility]  ORTHOPEDIC SURGEON: Roly Aguilera MD.    Chief Complaint:   Chief Complaint   Patient presents with   • hypertensive heart disease without heart failure     Problem List:  1. Probable hypertensive cardiovascular disease:  a. Apparent remote screening 2D echocardiogram, data deficit ([facility]), approximately 2005.   b. Apparent unremarkable serial treadmill GXT/Cardiolite GXT, data deficit ([facility], serially every 3-4 years x14 years).   c. Abnormal screening treadmill GXT showing physical deconditioning (accelerated HR with 107% age-predicted MHR with 50% PEC) with post-exercise EKG changes in the absence of anginal type chest pain, as well as frequent PVCs/PACs during exercise without complex forms; without accelerated blood pressure ([facility]), 04/04/12.   d. Acceptable IV LEXIscan Cardiolite study, LVEF (0.68) with residual class I symptoms, June 2012.  e. Residual class I symptoms, July 2020, July 2021, March 2022, September 2022, April 2023  2. Hypertension with remote suboptimal control, improved.  3. Dyslipidemia, maintained on statin.   4. Uncontrolled type 2 insulin dependent diabetes (insulin dependent since 2009); diagnosed approximately 1998, hemoglobin A1c 8.5% (November 2016); hemoglobin A1c 9.2% (September 2017); hemoglobin A1c 7.3% (September 2018); 7.7% (May 2019), 7.1% May 2020, 7.7% July 2021, 5.5% June 2022, 6.3% September 2022, 5.6% March 2023.  5. Overweight, BMI 29.1  6. Bilateral plantar fasciitis;  "utilizing orthotics.   7. Cataract extraction (OD, 2009; OS, 2011).   8. 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 Slip and fall with right ankle fracture with surgical repair in [place], January 2022; data deficit.  10. Presyncope with OSH ED visit in [place] November 2022, patient left AMA, hypotensive and dehydrated    Allergies   Allergen Reactions   • Sulfa Antibiotics Hives   • Atorvastatin Myalgia   • Pravachol [Pravastatin Sodium] Myalgia   • Pravastatin Myalgia   • Simvastatin Myalgia   • Pseudoephedrine Rash       Current Outpatient Medications   Medication Instructions   • aspirin 81 mg, Oral, Daily   • baclofen (LIORESAL) 10 MG tablet TO1 TO 2 TABLETS BY MOUTH TWO TIMES A DAY AS NEEDED FOR MUSCLE SPASMS   • bisoprolol (ZEBeta) 10 MG tablet Oral, Daily   • Dulaglutide 0.75 mg, Subcutaneous, Weekly   • empagliflozin (JARDIANCE) 25 mg, Oral, Daily   • gabapentin (NEURONTIN) 600 mg, Oral, 2 Times Daily   • glucose blood test strip Check glucose twice a day.   • glucose monitor monitoring kit Check glucose twice a day.   • Insulin Syringe-Needle U-100 (TRUEplus Insulin Syringe) 31G X 5/16\" 1 ML misc 1 each, Other, Daily, use to inject insulin once daily   • Lantus 10 Units, Subcutaneous, Daily   • lisinopril (PRINIVIL,ZESTRIL) 10 mg, Oral, Daily   • metFORMIN (GLUCOPHAGE) 1,000 mg, Oral, 2 Times Daily With Meals   • rosuvastatin (CRESTOR) 40 mg, Oral, Daily         HISTORY OF PRESENT ILLNESS:  The patient is here for 6-month follow-up.  In the interim " "the patient was seen in [facility] ED 11/21/2022 for presyncope upon standing in the setting of hypotension and dehydration (BP 80/60 in the field).  The patient left the ED AMA.  He had happened to be in [place] at the time and says that it was very hot outside and he was waiting to go inside to a show.  He said that he just felt hot and so he sat down on the ground.  The [employer] worker came to check on him because they noticed him sitting on the ground and called EMS.  He said that they had a difficult time starting an IV and he received 2 L of fluids.  He has not had any recurrent episodes of hypotension.  He denies any palpitations, chest pain, shortness of breath, abdominal pain, presyncope, syncope, nausea, or vomiting with the episode in [place] or thereafter.  He says he does a lot of walking at his job for exercise.  His blood pressures at home have been WNL.  His last A1c was 5.6%.  He tries to adhere to a heart healthy diet.      ROS   All other systems reviewed and otherwise negative.      ECG 12 Lead    Date/Time: 4/19/2023 3:58 PM  Performed by: Marisa Adams APRN  Authorized by: Marisa Adams APRN   Rhythm comments: Normal sinus rhythm with sinus arrhythmia, nonspecific T wave abnormality, abnormal ECG, 85 bpm, QRS 88 ms, QTc 4 and 14 ms,  ms, nonspecific changes compared to ECG in November 2016                 Objective:       Vitals:    04/19/23 1501 04/19/23 1514   BP: 114/69 116/73   BP Location: Right arm Right arm   Patient Position: Sitting Standing   Cuff Size: Adult Adult   Pulse: 80 98   SpO2: 98% 97%   Weight: 55.6 kg (122 lb 9.6 oz)    Height: 167.6 cm (66\")      Body mass index is 19.79 kg/m².  Last weight September 2022 was 133 pounds  Constitutional:       Appearance: Healthy appearance. Not in distress.   Neck:      Vascular: No JVR. JVD normal.   Pulmonary:      Effort: Pulmonary effort is normal.      Breath sounds: Normal breath sounds. No wheezing. No rhonchi. " No rales.   Chest:      Chest wall: Not tender to palpatation.   Cardiovascular:      PMI at left midclavicular line. Normal rate. Regular rhythm. Normal S1. Normal S2.      Murmurs: There is a grade 1/6 systolic murmur at the URSB.      No gallop. No click. No rub.   Pulses:     Dorsalis pedis: 1+ bilaterally.     Posterior tibial: 1+ bilaterally.  Edema:     Peripheral edema absent.   Abdominal:      General: Bowel sounds are normal.      Palpations: Abdomen is soft.      Tenderness: There is no abdominal tenderness.   Musculoskeletal: Normal range of motion.         General: No tenderness. Skin:     General: Skin is warm and dry.   Neurological:      General: No focal deficit present.      Mental Status: Alert and oriented to person, place and time.           Lab Review:   Lab Results   Component Value Date    GLUCOSE 138 (H) 06/16/2022    BUN 12 06/16/2022    CREATININE 1.10 12/07/2022    EGFRIFNONA 59 (L) 04/06/2021    BCR 12.2 06/16/2022    CO2 25.2 06/16/2022    CALCIUM 10.0 06/16/2022    ALBUMIN 4.80 06/16/2022    AST 24 06/16/2022    ALT 14 06/16/2022       Lab Results   Component Value Date    WBC 8.56 05/11/2020    HGB 17.2 05/11/2020    HCT 51.2 (H) 05/11/2020    MCV 86.3 05/11/2020     05/11/2020       Lab Results   Component Value Date    HGBA1C 5.6 03/01/2023       Lab Results   Component Value Date    TSH 1.100 02/27/2020       Lab Results   Component Value Date    CHOL 115 06/16/2022    CHOL 126 04/06/2021     Lab Results   Component Value Date    TRIG 83 06/16/2022    TRIG 74 04/06/2021     Lab Results   Component Value Date    HDL 46 06/16/2022    HDL 42 04/06/2021     Lab Results   Component Value Date    LDL 53 06/16/2022    LDL 69 04/06/2021     Advance Care Planning   ACP discussion was held with the patient during this visit. Patient does not have an advance directive, declines further assistance.         Assessment:       Overall continued acceptable course with no new interim  cardiopulmonary complaints with acceptable functional status. We will defer additional diagnostic or therapeutic intervention from a cardiac perspective at this time.  I may consider an echocardiogram +/- stress test in 2024 since his last ischemic evaluation was in 2012.     Diagnosis Plan   1. Hypertensive heart disease without heart failure  No recurrent angina pectoris or CHF on current activity schedule; continue current treatment      2. Dyslipidemia   Acceptable lipid panel June 2022, continue rosuvastatin      3. Prediabetes   Hemoglobin A1c 5.6% March 2023, continue heart healthy diet and physical activity as tolerated      4. Vasovagal syncope   No recurrent syncopal episodes, had presyncopal episode in November 2022 in the setting of dehydration and hypotension             Plan:         1. Patient to continue current medications and close follow up with the above providers.  2. Tentative cardiology follow up in November 2023 or patient may return sooner PRN.      Electronically signed by BRENDON Milton, 04/19/23, 4:03 PM EDT.

## 2023-04-19 NOTE — LETTER
April 19, 2023     Adalberto Albarado DO  [address] [address]    Patient: Jose D Bailey   YOB: 1955   Date of Visit: 4/19/2023       Dear Dr. Verena DO:    Thank you for referring Jose D Bailey to me for evaluation. Below are the relevant portions of my assessment and plan of care.    If you have questions, please do not hesitate to call me. I look forward to following Jose D along with you.         Sincerely,        BRENDON Milton        CC: No Recipients    Marisa Adams APRN  04/19/23 1604  Signed      Subjective:     Encounter Date:04/19/2023     Patient ID: Jose D Bailey is a 68 y.o.   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       REFERRING PHYSICIAN: Clinton Day MD  CURRENT PHYSICIAN: Adalberto Albarado DO  NEUROLOGIST:  BRENDON Pagan   UROLOGIST: Dr. Morin  GASTROENTEROLOGIST: Ruperto Barroso MD  NEPHROLOGIST: [facility]  ORTHOPEDIC SURGEON: Roly Aguilera MD.    Chief Complaint:   Chief Complaint   Patient presents with   • hypertensive heart disease without heart failure     Problem List:  1. Probable hypertensive cardiovascular disease:  a. Apparent remote screening 2D echocardiogram, data deficit ([facility]), approximately 2005.   b. Apparent unremarkable serial treadmill GXT/Cardiolite GXT, data deficit ([facility], serially every 3-4 years x14 years).   c. Abnormal screening treadmill GXT showing physical deconditioning (accelerated HR with 107% age-predicted MHR with 50% PEC) with post-exercise EKG changes in the absence of anginal type chest pain, as well as frequent PVCs/PACs during exercise without complex forms; without accelerated blood pressure ([facility]), 04/04/12.   d. Acceptable IV LEXIscan Cardiolite study, LVEF (0.68) with residual class I symptoms, June 2012.  e. Residual class I  symptoms, July 2020, July 2021, March 2022, September 2022, April 2023  2. Hypertension with remote suboptimal control, improved.  3. Dyslipidemia, maintained on statin.   4. Uncontrolled type 2 insulin dependent diabetes (insulin dependent since 2009); diagnosed approximately 1998, hemoglobin A1c 8.5% (November 2016); hemoglobin A1c 9.2% (September 2017); hemoglobin A1c 7.3% (September 2018); 7.7% (May 2019), 7.1% May 2020, 7.7% July 2021, 5.5% June 2022, 6.3% September 2022, 5.6% March 2023.  5. Overweight, BMI 29.1  6. Bilateral plantar fasciitis; utilizing orthotics.   7. Cataract extraction (OD, 2009; OS, 2011).   8. 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 Slip and fall with right ankle fracture with surgical repair in [place], January 2022; data deficit.  10. Presyncope with OSH ED visit in [place] November 2022, patient left AMA, hypotensive and dehydrated    Allergies   Allergen Reactions   • Sulfa Antibiotics Hives   • Atorvastatin Myalgia   • Pravachol [Pravastatin Sodium] Myalgia   • Pravastatin Myalgia   • Simvastatin Myalgia   • Pseudoephedrine Rash       Current Outpatient Medications   Medication Instructions   • aspirin 81 mg, Oral, Daily   • baclofen (LIORESAL) 10 MG tablet TO1 TO 2 TABLETS BY MOUTH TWO TIMES A DAY AS NEEDED FOR MUSCLE SPASMS   • bisoprolol (ZEBeta) 10 MG tablet Oral, Daily   • Dulaglutide 0.75 mg, Subcutaneous, Weekly   • empagliflozin (JARDIANCE) 25 mg, Oral, Daily   • gabapentin (NEURONTIN) 600 mg, Oral, 2 Times Daily  "  • glucose blood test strip Check glucose twice a day.   • glucose monitor monitoring kit Check glucose twice a day.   • Insulin Syringe-Needle U-100 (TRUEplus Insulin Syringe) 31G X 5/16\" 1 ML misc 1 each, Other, Daily, use to inject insulin once daily   • Lantus 10 Units, Subcutaneous, Daily   • lisinopril (PRINIVIL,ZESTRIL) 10 mg, Oral, Daily   • metFORMIN (GLUCOPHAGE) 1,000 mg, Oral, 2 Times Daily With Meals   • rosuvastatin (CRESTOR) 40 mg, Oral, Daily         HISTORY OF PRESENT ILLNESS:  The patient is here for 6-month follow-up.  In the interim the patient was seen in [facility] ED 11/21/2022 for presyncope upon standing in the setting of hypotension and dehydration (BP 80/60 in the field).  The patient left the ED AMA.  He had happened to be in [employer] at the time and says that it was very hot outside and he was waiting to go inside to a show.  He said that he just felt hot and so he sat down on the ground.  The [employer] worker came to check on him because they noticed him sitting on the ground and called EMS.  He said that they had a difficult time starting an IV and he received 2 L of fluids.  He has not had any recurrent episodes of hypotension.  He denies any palpitations, chest pain, shortness of breath, abdominal pain, presyncope, syncope, nausea, or vomiting with the episode in [employer] or thereafter.  He says he does a lot of walking at his job for exercise.  His blood pressures at home have been WNL.  His last A1c was 5.6%.  He tries to adhere to a heart healthy diet.      ROS   All other systems reviewed and otherwise negative.      ECG 12 Lead    Date/Time: 4/19/2023 3:58 PM  Performed by: Marisa Adams APRN  Authorized by: Marisa Adams APRN   Rhythm comments: Normal sinus rhythm with sinus arrhythmia, nonspecific T wave abnormality, abnormal ECG, 85 bpm, QRS 88 ms, QTc 4 and 14 ms,  ms, nonspecific changes compared to ECG in November 2016                Objective:    " "  Vitals:    04/19/23 1501 04/19/23 1514   BP: 114/69 116/73   BP Location: Right arm Right arm   Patient Position: Sitting Standing   Cuff Size: Adult Adult   Pulse: 80 98   SpO2: 98% 97%   Weight: 55.6 kg (122 lb 9.6 oz)    Height: 167.6 cm (66\")      Body mass index is 19.79 kg/m².  Last weight September 2022 was 133 pounds  Constitutional:       Appearance: Healthy appearance. Not in distress.   Neck:      Vascular: No JVR. JVD normal.   Pulmonary:      Effort: Pulmonary effort is normal.      Breath sounds: Normal breath sounds. No wheezing. No rhonchi. No rales.   Chest:      Chest wall: Not tender to palpatation.   Cardiovascular:      PMI at left midclavicular line. Normal rate. Regular rhythm. Normal S1. Normal S2.      Murmurs: There is a grade 1/6 systolic murmur at the URSB.      No gallop. No click. No rub.   Pulses:     Dorsalis pedis: 1+ bilaterally.     Posterior tibial: 1+ bilaterally.  Edema:     Peripheral edema absent.   Abdominal:      General: Bowel sounds are normal.      Palpations: Abdomen is soft.      Tenderness: There is no abdominal tenderness.   Musculoskeletal: Normal range of motion.         General: No tenderness. Skin:     General: Skin is warm and dry.   Neurological:      General: No focal deficit present.      Mental Status: Alert and oriented to person, place and time.           Lab Review:   Lab Results   Component Value Date    GLUCOSE 138 (H) 06/16/2022    BUN 12 06/16/2022    CREATININE 1.10 12/07/2022    EGFRIFNONA 59 (L) 04/06/2021    BCR 12.2 06/16/2022    CO2 25.2 06/16/2022    CALCIUM 10.0 06/16/2022    ALBUMIN 4.80 06/16/2022    AST 24 06/16/2022    ALT 14 06/16/2022       Lab Results   Component Value Date    WBC 8.56 05/11/2020    HGB 17.2 05/11/2020    HCT 51.2 (H) 05/11/2020    MCV 86.3 05/11/2020     05/11/2020       Lab Results   Component Value Date    HGBA1C 5.6 03/01/2023       Lab Results   Component Value Date    TSH 1.100 02/27/2020       Lab Results "   Component Value Date    CHOL 115 06/16/2022    CHOL 126 04/06/2021     Lab Results   Component Value Date    TRIG 83 06/16/2022    TRIG 74 04/06/2021     Lab Results   Component Value Date    HDL 46 06/16/2022    HDL 42 04/06/2021     Lab Results   Component Value Date    LDL 53 06/16/2022    LDL 69 04/06/2021     Advance Care Planning   ACP discussion was held with the patient during this visit. Patient does not have an advance directive, declines further assistance.       Assessment:      Overall continued acceptable course with no new interim cardiopulmonary complaints with acceptable functional status. We will defer additional diagnostic or therapeutic intervention from a cardiac perspective at this time.  I may consider an echocardiogram +/- stress test in 2024 since his last ischemic evaluation was in 2012.     Diagnosis Plan   1. Hypertensive heart disease without heart failure  No recurrent angina pectoris or CHF on current activity schedule; continue current treatment      2. Dyslipidemia   Acceptable lipid panel June 2022, continue rosuvastatin      3. Prediabetes   Hemoglobin A1c 5.6% March 2023, continue heart healthy diet and physical activity as tolerated      4. Vasovagal syncope   No recurrent syncopal episodes, had presyncopal episode in November 2022 in the setting of dehydration and hypotension            Plan:        1. Patient to continue current medications and close follow up with the above providers.  2. Tentative cardiology follow up in November 2023 or patient may return sooner PRN.     Electronically signed by BRENDON Milton, 04/19/23, 4:03 PM EDT.

## 2023-04-28 ENCOUNTER — OFFICE VISIT (OUTPATIENT)
Dept: NEUROLOGY | Facility: CLINIC | Age: 68
End: 2023-04-28
Payer: COMMERCIAL

## 2023-04-28 VITALS
OXYGEN SATURATION: 97 % | HEIGHT: 66 IN | HEART RATE: 67 BPM | BODY MASS INDEX: 20.41 KG/M2 | WEIGHT: 127 LBS | SYSTOLIC BLOOD PRESSURE: 112 MMHG | DIASTOLIC BLOOD PRESSURE: 66 MMHG

## 2023-04-28 DIAGNOSIS — G51.39 FACIAL SPASM: ICD-10-CM

## 2023-04-28 DIAGNOSIS — G89.0 CENTRAL PAIN SYNDROME: Primary | ICD-10-CM

## 2023-04-28 DIAGNOSIS — G45.8 OTHER TRANSIENT CEREBRAL ISCHEMIC ATTACKS AND RELATED SYNDROMES: ICD-10-CM

## 2023-04-28 DIAGNOSIS — Z86.73 HISTORY OF CVA (CEREBROVASCULAR ACCIDENT): ICD-10-CM

## 2023-04-28 DIAGNOSIS — G89.29 CHRONIC RIGHT-SIDED THORACIC BACK PAIN: ICD-10-CM

## 2023-04-28 DIAGNOSIS — E11.42 DIABETIC PERIPHERAL NEUROPATHY: ICD-10-CM

## 2023-04-28 DIAGNOSIS — I69.90 LATE EFFECTS OF CVA (CEREBROVASCULAR ACCIDENT): ICD-10-CM

## 2023-04-28 DIAGNOSIS — M54.6 CHRONIC RIGHT-SIDED THORACIC BACK PAIN: ICD-10-CM

## 2023-04-28 RX ORDER — BACLOFEN 10 MG/1
10-20 TABLET ORAL 2 TIMES DAILY PRN
Qty: 180 TABLET | Refills: 3 | Status: SHIPPED | OUTPATIENT
Start: 2023-04-28

## 2023-04-28 RX ORDER — GABAPENTIN 600 MG/1
600 TABLET ORAL 2 TIMES DAILY
Qty: 180 TABLET | Refills: 1 | Status: SHIPPED | OUTPATIENT
Start: 2023-04-28

## 2023-04-28 NOTE — PROGRESS NOTES
Subjective:     Patient ID: Jose D Bailey is a 68 y.o. male.    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     Long term, we have been prescribing him gabapentin 600 mg twice per day.     At his last visit in clinic, he was reporting some issues with gait difficulties and ataxia, which was a new symptom for him. He was also noticing some worsening neuralgia of his right flank region along with having muscle spasms that were worsening in his flank region. I did go ahead and order an MRI of the brain and thoracic spine.     MRI of the brain was with and without contrast. This was completed on 12/07/2022. This did show stable chronic and age-related changes present. No acute intracranial abnormalities. No abnormal contrast enhancement present. Chronic appearing bilateral lacunar infarcts seen within the left basal ganglia and similar to prior imaging from 2016.     MRI of the thoracic spine without contrast on 12/07/2022 did show some mildly exaggerated thoracic kyphosis. No acute abnormalities. Minimal spondylolysis present. No lesions or other abnormalities present. This is per radiology.     Most recent labs on 03/01/2023, hemoglobin A1c excellent at 5.6 percent. 12/07/2022 creatinine 1.10 mg/dL within normal limits. He has been taking baclofen as needed for the muscle spasms.     He is here for follow-up and refills on medications today.     He confirms he had a presyncopal episode in 11/2022 on his first day at [employer]. He was with his wife, children, and grandchildren. He states his blood pressure was a little on the low side, and he was slightly dehydrated. He spent 6 hours in the emergency room,  "they wanted to do tests, and he told them that he had overheated. He reports sitting down before the show when he was hot and it was crowded. He reports that he told a [employer] employee he was sitting down, he felt hot, and they called EMT services who took his blood pressure and took him backstage and gave him intravenous fluids. It was cooler and he started feeling better. They did an EKG. He was transported via ambulance to [facility] emergency room. They wanted to do chest x-rays and 2 other tests which he declined because he was not ill. They also gave him additional intravenous fluid and did another EKGs. He discharged himself against medical advice. The rest of the trip went well. His family kept pushing fluids on him the rest of the trip. He denies any other episodes since that time.    He states his neuropathy symptoms are the same.     He reports 2 weeks ago, he got up one morning to go to work, and his right leg felt like it was \"huge\" like 4 times the size of the left leg. He denies any swelling. He reports that his right leg, foot and arm had a severe numbing sensation. He states every time he went to the bathroom, or tried to ambulate, he was stumbling and feeling for the floor. His right arm felt like he had a tourniquet on. He reports his wife took his blood pressure, and it was normal. This feeling started at 6:00 or 6:30 a.m. and lasted until about 2:00 p.m. and then resolved. He denies having any other symptoms. He attributes it to possibly having slept in a weird position. He denies any headache, blurred vision, or metallic taste in his mouth. He states that when he had the cerebrovascular accident, he had a metallic taste and sharp burning pain in his head. He denies missing any doses of aspirin or rosuvastatin. He agrees to have a CTA scan of the brain and neck.    He denies any changes in his gait or balance or any falls since his fracture last year.    He takes 1 baclofen every night which " "helps him sleep well. He confirms that he went to physical therapy since last visit, and it helped with his back. He reports that his back is less tense and he has less \"knots.\"    He reports he is doing well and he lost weight when he had his teeth extracted.  He has gained most of the weight back. He confirms he has dentures and that he is doing better. His primary care at his recent visit thought he was doing well and his A1c was 5.6%.        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    He had EMG/NCVS 6/7/18 completed on all 4 extremities showing right median neuropathy at wrist but was otherwise normal. 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  He has had no recurrent stroke symptoms. History of left subthalamic lacunar stroke in November 2016 with normal MRI Brain. On aspirin and Crestor for 2nd stroke prevention.       Has had chronic right-sided paresthesias with central pain syndrome following suspected left subthalamic lacunar stroke from 11/2016. He has continued to have alteration in sensation as far as temperatures and textures on the right side since his stroke especially in regards to softer objects. Hard objects are easier for him to hold. He does have some issues with fine motor & manual dexterity.     The patient has lost weight unintentionally. He states he sustained a fall after he slipped on a piece of ice on 01/08/2022 and fractured his right ankle in 3 places. He had to undergo surgery for the same by Dr. Aguilera and he states he has 9 screws and a plate in his right ankle.       Follows with [facility] for Left eye diabetic retinopathy and has injections every 4 weeks.     He does report some continued right hand pain and numbness and tingling in first through third " fingers.  EMG and NCVS 2018 confirmed carpal tunnel syndrome.      He does continue to work on [employer] with an [employer] in [place], [place].      The patient is currently taking over-the-counter aspirin 81 mg daily and rosuvastatin.      Dr. Ady Norwood nephrology.     The patient states he had 21 teeth removed in 12/2021 secondary to diabetic issues.      Now has dentures.    He notes since his stroke in 2016, he is unable to lay on his right side. He states he will experience rhinorrhea on his right side without realizing, as he is unable to feel this side of his face since his stroke.     The following portions of the patient's history were reviewed and updated as appropriate: allergies, current medications, past family history, past medical history, past social history, past surgical history and problem list.    Past Medical History:   Diagnosis Date   • Acute sinusitis    • Acute upper respiratory infection    • Cataract     Cataract extraction (OD, 2009;  OS, 2011).    • Chest pain    • Decreased ROM of left shoulder    • Diabetes mellitus    • Diabetic retinopathy    • Diabetic retinopathy associated with controlled type 2 diabetes mellitus 2020    Dr. Salinas giving shots in eyes    • Dyslipidemia     Remained on statin   • ED (erectile dysfunction)    • H/O cardiovascular stress test     2012   • Herpes zoster    • Hyperlipidemia    • Hypertension    • Insulin dependent type 2 diabetes mellitus     Type 2 insulin dependent diabetes (insulin dependent since 2009);  diagnosed approximately 1998.    • Junctional nevus of right forearm    • Nevus, atypical     Right arm   • Overweight     Mild overweight status, BMI 26.7.     • Plantar fasciitis     Bilateral plantar fasciitis;  utilizing orthotics.    • Quadriceps muscle strain    • Vasovagal syncope        Past Surgical History:   Procedure Laterality Date   • ANKLE SURGERY Right     broke ankle in 3 places   • ANKLE SURGERY   "01/09/2022    @ [facility]   • CATARACT EXTRACTION      OS   • COLONOSCOPY      10/11/2010       Social History     Socioeconomic History   • Marital status:    Tobacco Use   • Smoking status: Never   • Smokeless tobacco: Never   Vaping Use   • Vaping Use: Never used   Substance and Sexual Activity   • Alcohol use: No   • Drug use: No   • Sexual activity: Defer     Partners: Female     Comment:        Family History   Problem Relation Age of Onset   • Stroke Mother    • Breast cancer Mother    • Cancer Father         lung    • Stroke Father    • Heart attack Father    • Other Father         CABG x6   • Coronary artery disease Father    • Diabetes Father    • Lung cancer Father    • Coronary artery disease Maternal Grandmother           Current Outpatient Medications:   •  aspirin 81 MG tablet, Take 1 tablet by mouth Daily., Disp: , Rfl:   •  baclofen (LIORESAL) 10 MG tablet, Take 1-2 tablets by mouth 2 (Two) Times a Day As Needed for Muscle Spasms., Disp: 180 tablet, Rfl: 3  •  bisoprolol (ZEBeta) 10 MG tablet, Take  by mouth Daily., Disp: , Rfl:   •  Dulaglutide 0.75 MG/0.5ML solution pen-injector, Inject 0.75 mg under the skin into the appropriate area as directed 1 (One) Time Per Week., Disp: 2 mL, Rfl: 2  •  empagliflozin (Jardiance) 25 MG tablet tablet, Take 1 tablet by mouth Daily., Disp: 90 tablet, Rfl: 3  •  gabapentin (NEURONTIN) 600 MG tablet, Take 1 tablet by mouth 2 (Two) Times a Day., Disp: 180 tablet, Rfl: 1  •  glucose blood test strip, Check glucose twice a day., Disp: 50 each, Rfl: 7  •  glucose monitor monitoring kit, Check glucose twice a day., Disp: 1 each, Rfl: 0  •  Insulin Syringe-Needle U-100 (TRUEplus Insulin Syringe) 31G X 5/16\" 1 ML misc, 1 each by Other route Daily. use to inject insulin once daily (Patient taking differently: 1 each by Other route Daily. use to inject insulin once , 20 units), Disp: 100 each, Rfl: 2  •  Lantus 100 UNIT/ML injection, Inject 10 Units " "under the skin into the appropriate area as directed Daily for 90 days., Disp: 9 mL, Rfl: 0  •  lisinopril (PRINIVIL,ZESTRIL) 10 MG tablet, Take 1 tablet by mouth Daily., Disp: 90 tablet, Rfl: 3  •  metFORMIN (Glucophage) 1000 MG tablet, Take 1 tablet by mouth 2 (Two) Times a Day With Meals., Disp: 180 tablet, Rfl: 1  •  rosuvastatin (CRESTOR) 40 MG tablet, Take 1 tablet by mouth Daily., Disp: 90 tablet, Rfl: 3     Review of Systems   Constitutional: Negative for chills, fatigue, fever and unexpected weight change.   HENT: Negative for ear pain, hearing loss, nosebleeds, rhinorrhea and sore throat.    Eyes: Negative for photophobia, pain, discharge, itching and visual disturbance.   Respiratory: Negative for cough, chest tightness, shortness of breath and wheezing.    Cardiovascular: Negative for chest pain, palpitations and leg swelling.   Gastrointestinal: Negative for abdominal pain, blood in stool, constipation, diarrhea, nausea and vomiting.   Genitourinary: Negative for dysuria, frequency, hematuria and urgency.   Musculoskeletal: Positive for back pain. Negative for arthralgias, gait problem, joint swelling, myalgias, neck pain and neck stiffness.   Skin: Negative for rash and wound.   Allergic/Immunologic: Negative for environmental allergies and food allergies.   Neurological: Positive for numbness. Negative for dizziness, tremors, seizures, syncope, speech difficulty, weakness, light-headedness and headaches.   Hematological: Negative for adenopathy. Does not bruise/bleed easily.   Psychiatric/Behavioral: Negative for agitation, confusion, decreased concentration, hallucinations, sleep disturbance and suicidal ideas. The patient is not nervous/anxious.    All other systems reviewed and are negative.       Objective:  /66   Pulse 67   Ht 167.6 cm (66\")   Wt 57.6 kg (127 lb)   SpO2 97%   BMI 20.50 kg/m²     Neurologic Exam     Mental Status   Oriented to person, place, and time.   Speech: speech " is normal   Level of consciousness: alert    Cranial Nerves     CN II   Visual fields full to confrontation.     CN III, IV, VI   Pupils are equal, round, and reactive to light.  Extraocular motions are normal.     CN V   Right facial sensation deficit: decreased sensation right face chronic.  Left facial sensation deficit: none    CN VII   Right facial weakness: central  Left facial weakness: none    CN VIII   CN VIII normal.     CN IX, X   CN IX normal.   CN X normal.     CN XI   CN XI normal.     CN XII   CN XII normal.     Motor Exam   Muscle bulk: normal  Overall muscle tone: normal    Strength   Strength 5/5 throughout.     Sensory Exam     Chronic decreased sensation in right face, right arm, right flank, and right lower extremity, which is unchanged.       Gait, Coordination, and Reflexes     Gait  Gait: (minimal antalgia, no ataxia)    Coordination   Romberg: negative  Finger to nose coordination: normal  Heel to shin coordination: normal  Tandem walking coordination: normal    Tremor   Resting tremor: absent  Intention tremor: absent  Action tremor: absent    Reflexes   Right : 2+  Left : 2+      Physical Exam  Constitutional:       Appearance: Normal appearance.   Eyes:      Extraocular Movements: EOM normal.      Pupils: Pupils are equal, round, and reactive to light.   Neurological:      Mental Status: He is alert and oriented to person, place, and time.      Motor: Motor strength is normal.      Coordination: Finger-Nose-Finger Test, Heel to Shin Test and Romberg Test normal.      Gait: Tandem walk normal.   Psychiatric:         Mood and Affect: Mood and affect normal.         Speech: Speech normal.         Behavior: Behavior normal.         Thought Content: Thought content normal.         Cognition and Memory: Cognition and memory normal.         Judgment: Judgment normal.         Assessment/Plan:       Diagnoses and all orders for this visit:    1. Central pain syndrome (Primary)  -      gabapentin (NEURONTIN) 600 MG tablet; Take 1 tablet by mouth 2 (Two) Times a Day.  Dispense: 180 tablet; Refill: 1    2. Late effects of CVA (cerebrovascular accident)  Comments:  continue aspirin, rosuvastatin and vascular risk factor management    3. Diabetic peripheral neuropathy  -     gabapentin (NEURONTIN) 600 MG tablet; Take 1 tablet by mouth 2 (Two) Times a Day.  Dispense: 180 tablet; Refill: 1    4. Chronic right-sided thoracic back pain  Comments:  completed physical therapy and this was helpful, complete PT exercises at home now  Orders:  -     baclofen (LIORESAL) 10 MG tablet; Take 1-2 tablets by mouth 2 (Two) Times a Day As Needed for Muscle Spasms.  Dispense: 180 tablet; Refill: 3    5. Facial spasm  Comments:  Right. minimal.  Orders:  -     baclofen (LIORESAL) 10 MG tablet; Take 1-2 tablets by mouth 2 (Two) Times a Day As Needed for Muscle Spasms.  Dispense: 180 tablet; Refill: 3  -     gabapentin (NEURONTIN) 600 MG tablet; Take 1 tablet by mouth 2 (Two) Times a Day.  Dispense: 180 tablet; Refill: 1    6. Other transient cerebral ischemic attacks and related syndromes  -     CT Angiogram Neck; Future  -     CT Angiogram Head; Future    7. History of CVA (cerebrovascular accident)  -     CT Angiogram Neck; Future  -     CT Angiogram Head; Future    Total time of visit today 40 minutes which including reviewing PCP notes, Cardiology notes, ER notes, obtaining additional history, completing exam, discussing findings and recommendations in detail including the following:  Due to recent transient spell of numbness, heaviness on right side and gait difficulties, we have recommended and he is agreeable to CTA head and neck to r/o cerebral artery, vertebral artery or carotid artery stenosis that could contribute to these symptoms. We reviewed MRI Brain and T spine. Continue home PT exercises. Continue current meds. Follow up in 6 months or sooner if needed. If he has another episode as described recently, I  have recommended he proceed to ER for further evaluation.     Reviewed medications, potential side effects and signs and symptoms to report. Discussed risk versus benefits of treatment plan with patient and/or family-including medications, labs and radiology that may be ordered. Addressed questions and concerns during visit. Patient and/or family verbalized understanding and agree with plan.    As part of this patient's treatment plan I am prescribing controlled substances. The patient has been made aware of appropriate use of such medications, including potential risk of somnolence, limited ability to drive and/or work safely, and potential for dependence or overdose. It has also been made clear that these medications are for use by the patient only, without concomitant use of alcohol or other substances unless prescribed. Keep out of reach of children.  Shoaib report has been reviewed. If this is going to be prescribed long term, [facility] [place] has also been read and signed by patient and myself.    Discussed signs and symptoms of stroke and when to call 911. Instructed to follow a low fat diet including the Mediterranean diet. Instructed to take all medications daily as prescribed. Encouraged 30 minutes of exercise 3-4 times a week as tolerated. Stay well hydrated. Discussed potential side effects of new medications and signs and symptoms to report. If patient is currently using tobacco products, smoking cessation was encouraged. Reviewed stroke risk factors and stroke prevention plan. Patient and/or family verbalizes understanding and agrees with plan.     During this visit the following were done:  Labs Reviewed [x]    Labs Ordered []    Radiology Reports Reviewed [x]    Radiology Ordered [x]    PCP Records Reviewed [x]    Referring Provider Records Reviewed []    ER Records Reviewed []    Hospital Records Reviewed []    History Obtained From Family []    Radiology Images Reviewed  [x]    Other Reviewed [x]  Cardiology notes reviewed  Records Requested []      Transcribed from ambient dictation for BRENDON Abrams by Linda Calixto.  04/28/23   16:35 EDT    Patient or patient representative verbalized consent to the visit recording.  I have personally performed the services described in this document as transcribed by the above individual, and it is both accurate and complete.  BRENDON Abrams  4/29/2023  16:00 EDT

## 2023-04-28 NOTE — LETTER
April 29, 2023     Adalberto Albarado DO  [facility] [address]    Patient: Jose D Bailey   YOB: 1955   Date of Visit: 4/28/2023       Dear Adalberto Albarado DO    Jose D Bailey was in my office today. Below is a copy of my note.    If you have questions, please do not hesitate to call me. I look forward to following Jose D along with you.         Sincerely,        BRENDON Abrams        CC: MD Marisa Chong APRN    Subjective:     Patient ID: Jose D Bailey is a 68 y.o. male.    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     Long term, we have been prescribing him gabapentin 600 mg twice per day.     At his last visit in clinic, he was reporting some issues with gait difficulties and ataxia, which was a new symptom for him. He was also noticing some worsening neuralgia of his right flank region along with having muscle spasms that were worsening in his flank region. I did go ahead and order an MRI of the brain and thoracic spine.     MRI of the brain was with and without contrast. This was completed on 12/07/2022. This did show stable chronic and age-related changes present. No acute intracranial abnormalities. No abnormal contrast enhancement present. Chronic appearing bilateral lacunar infarcts seen within the left basal ganglia and similar to prior imaging from 2016.     MRI of the thoracic spine without contrast on 12/07/2022 did show some mildly exaggerated thoracic kyphosis. No acute abnormalities. Minimal spondylolysis present. No lesions or other abnormalities present. This is per radiology.     Most recent labs on 03/01/2023, hemoglobin A1c  "excellent at 5.6 percent. 12/07/2022 creatinine 1.10 mg/dL within normal limits. He has been taking baclofen as needed for the muscle spasms.     He is here for follow-up and refills on medications today.     He confirms he had a presyncopal episode in 11/2022 on his first day at [employer]. He was with his wife, children, and grandchildren. He states his blood pressure was a little on the low side, and he was slightly dehydrated. He spent 6 hours in the emergency room, they wanted to do tests, and he told them that he had overheated. He reports sitting down before the show when he was hot and it was crowded. He reports that he told a [employer] employee he was sitting down, he felt hot, and they called EMT services who took his blood pressure and took him backstage and gave him intravenous fluids. It was cooler and he started feeling better. They did an EKG. He was transported via ambulance to [place] emergency room. They wanted to do chest x-rays and 2 other tests which he declined because he was not ill. They also gave him additional intravenous fluid and did another EKGs. He discharged himself against medical advice. The rest of the trip went well. His family kept pushing fluids on him the rest of the trip. He denies any other episodes since that time.    He states his neuropathy symptoms are the same.     He reports 2 weeks ago, he got up one morning to go to work, and his right leg felt like it was \"huge\" like 4 times the size of the left leg. He denies any swelling. He reports that his right leg, foot and arm had a severe numbing sensation. He states every time he went to the bathroom, or tried to ambulate, he was stumbling and feeling for the floor. His right arm felt like he had a tourniquet on. He reports his wife took his blood pressure, and it was normal. This feeling started at 6:00 or 6:30 a.m. and lasted until about 2:00 p.m. and then resolved. He denies having any other symptoms. He attributes it " "to possibly having slept in a weird position. He denies any headache, blurred vision, or metallic taste in his mouth. He states that when he had the cerebrovascular accident, he had a metallic taste and sharp burning pain in his head. He denies missing any doses of aspirin or rosuvastatin. He agrees to have a CTA scan of the brain and neck.    He denies any changes in his gait or balance or any falls since his fracture last year.    He takes 1 baclofen every night which helps him sleep well. He confirms that he went to physical therapy since last visit, and it helped with his back. He reports that his back is less tense and he has less \"knots.\"    He reports he is doing well and he lost weight when he had his teeth extracted.  He has gained most of the weight back. He confirms he has dentures and that he is doing better. His primary care at his recent visit thought he was doing well and his A1c was 5.6%.        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    He had EMG/NCVS 6/7/18 completed on all 4 extremities showing right median neuropathy at wrist but was otherwise normal. 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  He has had no recurrent stroke symptoms. History of left subthalamic lacunar stroke in November 2016 with normal MRI Brain. On aspirin and Crestor for 2nd stroke prevention.       Has had chronic right-sided paresthesias with central pain syndrome following suspected left subthalamic lacunar stroke from 11/2016. He has continued to have alteration in sensation as far as temperatures and textures on the right side since his stroke especially in regards to softer objects. Hard objects are easier for him to hold. He does have some issues with fine motor & manual dexterity.     The patient has lost " weight unintentionally. He states he sustained a fall after he slipped on a piece of ice on 01/08/2022 and fractured his right ankle in 3 places. He had to undergo surgery for the same by Dr. Aguilera and he states he has 9 screws and a plate in his right ankle.       Follows with [facility] for Left eye diabetic retinopathy and has injections every 4 weeks.     He does report some continued right hand pain and numbness and tingling in first through third fingers.  EMG and NCVS 2018 confirmed carpal tunnel syndrome.      He does continue to work on [employer] with an [employer] in [place], [place].      The patient is currently taking over-the-counter aspirin 81 mg daily and rosuvastatin.      Dr. Ady Norwood nephrology.     The patient states he had 21 teeth removed in 12/2021 secondary to diabetic issues.      Now has dentures.    He notes since his stroke in 2016, he is unable to lay on his right side. He states he will experience rhinorrhea on his right side without realizing, as he is unable to feel this side of his face since his stroke.     The following portions of the patient's history were reviewed and updated as appropriate: allergies, current medications, past family history, past medical history, past social history, past surgical history and problem list.    Past Medical History:   Diagnosis Date   • Acute sinusitis    • Acute upper respiratory infection    • Cataract     Cataract extraction (OD, 2009;  OS, 2011).    • Chest pain    • Decreased ROM of left shoulder    • Diabetes mellitus    • Diabetic retinopathy    • Diabetic retinopathy associated with controlled type 2 diabetes mellitus 2020    Dr. Salinas giving shots in eyes    • Dyslipidemia     Remained on statin   • ED (erectile dysfunction)    • H/O cardiovascular stress test     2012   • Herpes zoster    • Hyperlipidemia    • Hypertension    • Insulin dependent type 2 diabetes mellitus     Type 2 insulin  dependent diabetes (insulin dependent since 2009);  diagnosed approximately 1998.    • Junctional nevus of right forearm    • Nevus, atypical     Right arm   • Overweight     Mild overweight status, BMI 26.7.     • Plantar fasciitis     Bilateral plantar fasciitis;  utilizing orthotics.    • Quadriceps muscle strain    • Vasovagal syncope        Past Surgical History:   Procedure Laterality Date   • ANKLE SURGERY Right     broke ankle in 3 places   • ANKLE SURGERY  01/09/2022    @ [facility]   • CATARACT EXTRACTION      OS   • COLONOSCOPY      10/11/2010       Social History     Socioeconomic History   • Marital status:    Tobacco Use   • Smoking status: Never   • Smokeless tobacco: Never   Vaping Use   • Vaping Use: Never used   Substance and Sexual Activity   • Alcohol use: No   • Drug use: No   • Sexual activity: Defer     Partners: Female     Comment:        Family History   Problem Relation Age of Onset   • Stroke Mother    • Breast cancer Mother    • Cancer Father         lung    • Stroke Father    • Heart attack Father    • Other Father         CABG x6   • Coronary artery disease Father    • Diabetes Father    • Lung cancer Father    • Coronary artery disease Maternal Grandmother           Current Outpatient Medications:   •  aspirin 81 MG tablet, Take 1 tablet by mouth Daily., Disp: , Rfl:   •  baclofen (LIORESAL) 10 MG tablet, Take 1-2 tablets by mouth 2 (Two) Times a Day As Needed for Muscle Spasms., Disp: 180 tablet, Rfl: 3  •  bisoprolol (ZEBeta) 10 MG tablet, Take  by mouth Daily., Disp: , Rfl:   •  Dulaglutide 0.75 MG/0.5ML solution pen-injector, Inject 0.75 mg under the skin into the appropriate area as directed 1 (One) Time Per Week., Disp: 2 mL, Rfl: 2  •  empagliflozin (Jardiance) 25 MG tablet tablet, Take 1 tablet by mouth Daily., Disp: 90 tablet, Rfl: 3  •  gabapentin (NEURONTIN) 600 MG tablet, Take 1 tablet by mouth 2 (Two) Times a Day., Disp: 180 tablet, Rfl: 1  •  glucose  "blood test strip, Check glucose twice a day., Disp: 50 each, Rfl: 7  •  glucose monitor monitoring kit, Check glucose twice a day., Disp: 1 each, Rfl: 0  •  Insulin Syringe-Needle U-100 (TRUEplus Insulin Syringe) 31G X 5/16\" 1 ML misc, 1 each by Other route Daily. use to inject insulin once daily (Patient taking differently: 1 each by Other route Daily. use to inject insulin once , 20 units), Disp: 100 each, Rfl: 2  •  Lantus 100 UNIT/ML injection, Inject 10 Units under the skin into the appropriate area as directed Daily for 90 days., Disp: 9 mL, Rfl: 0  •  lisinopril (PRINIVIL,ZESTRIL) 10 MG tablet, Take 1 tablet by mouth Daily., Disp: 90 tablet, Rfl: 3  •  metFORMIN (Glucophage) 1000 MG tablet, Take 1 tablet by mouth 2 (Two) Times a Day With Meals., Disp: 180 tablet, Rfl: 1  •  rosuvastatin (CRESTOR) 40 MG tablet, Take 1 tablet by mouth Daily., Disp: 90 tablet, Rfl: 3     Review of Systems   Constitutional: Negative for chills, fatigue, fever and unexpected weight change.   HENT: Negative for ear pain, hearing loss, nosebleeds, rhinorrhea and sore throat.    Eyes: Negative for photophobia, pain, discharge, itching and visual disturbance.   Respiratory: Negative for cough, chest tightness, shortness of breath and wheezing.    Cardiovascular: Negative for chest pain, palpitations and leg swelling.   Gastrointestinal: Negative for abdominal pain, blood in stool, constipation, diarrhea, nausea and vomiting.   Genitourinary: Negative for dysuria, frequency, hematuria and urgency.   Musculoskeletal: Positive for back pain. Negative for arthralgias, gait problem, joint swelling, myalgias, neck pain and neck stiffness.   Skin: Negative for rash and wound.   Allergic/Immunologic: Negative for environmental allergies and food allergies.   Neurological: Positive for numbness. Negative for dizziness, tremors, seizures, syncope, speech difficulty, weakness, light-headedness and headaches.   Hematological: Negative for " "adenopathy. Does not bruise/bleed easily.   Psychiatric/Behavioral: Negative for agitation, confusion, decreased concentration, hallucinations, sleep disturbance and suicidal ideas. The patient is not nervous/anxious.    All other systems reviewed and are negative.       Objective:  /66   Pulse 67   Ht 167.6 cm (66\")   Wt 57.6 kg (127 lb)   SpO2 97%   BMI 20.50 kg/m²     Neurologic Exam     Mental Status   Oriented to person, place, and time.   Speech: speech is normal   Level of consciousness: alert    Cranial Nerves     CN II   Visual fields full to confrontation.     CN III, IV, VI   Pupils are equal, round, and reactive to light.  Extraocular motions are normal.     CN V   Right facial sensation deficit: decreased sensation right face chronic.  Left facial sensation deficit: none    CN VII   Right facial weakness: central  Left facial weakness: none    CN VIII   CN VIII normal.     CN IX, X   CN IX normal.   CN X normal.     CN XI   CN XI normal.     CN XII   CN XII normal.     Motor Exam   Muscle bulk: normal  Overall muscle tone: normal    Strength   Strength 5/5 throughout.     Sensory Exam     Chronic decreased sensation in right face, right arm, right flank, and right lower extremity, which is unchanged.       Gait, Coordination, and Reflexes     Gait  Gait: (minimal antalgia, no ataxia)    Coordination   Romberg: negative  Finger to nose coordination: normal  Heel to shin coordination: normal  Tandem walking coordination: normal    Tremor   Resting tremor: absent  Intention tremor: absent  Action tremor: absent    Reflexes   Right : 2+  Left : 2+      Physical Exam  Constitutional:       Appearance: Normal appearance.   Eyes:      Extraocular Movements: EOM normal.      Pupils: Pupils are equal, round, and reactive to light.   Neurological:      Mental Status: He is alert and oriented to person, place, and time.      Motor: Motor strength is normal.      Coordination: Finger-Nose-Finger " Test, Heel to Montejo Test and Romberg Test normal.      Gait: Tandem walk normal.   Psychiatric:         Mood and Affect: Mood and affect normal.         Speech: Speech normal.         Behavior: Behavior normal.         Thought Content: Thought content normal.         Cognition and Memory: Cognition and memory normal.         Judgment: Judgment normal.         Assessment/Plan:      Diagnoses and all orders for this visit:    1. Central pain syndrome (Primary)  -     gabapentin (NEURONTIN) 600 MG tablet; Take 1 tablet by mouth 2 (Two) Times a Day.  Dispense: 180 tablet; Refill: 1    2. Late effects of CVA (cerebrovascular accident)  Comments:  continue aspirin, rosuvastatin and vascular risk factor management    3. Diabetic peripheral neuropathy  -     gabapentin (NEURONTIN) 600 MG tablet; Take 1 tablet by mouth 2 (Two) Times a Day.  Dispense: 180 tablet; Refill: 1    4. Chronic right-sided thoracic back pain  Comments:  completed physical therapy and this was helpful, complete PT exercises at home now  Orders:  -     baclofen (LIORESAL) 10 MG tablet; Take 1-2 tablets by mouth 2 (Two) Times a Day As Needed for Muscle Spasms.  Dispense: 180 tablet; Refill: 3    5. Facial spasm  Comments:  Right. minimal.  Orders:  -     baclofen (LIORESAL) 10 MG tablet; Take 1-2 tablets by mouth 2 (Two) Times a Day As Needed for Muscle Spasms.  Dispense: 180 tablet; Refill: 3  -     gabapentin (NEURONTIN) 600 MG tablet; Take 1 tablet by mouth 2 (Two) Times a Day.  Dispense: 180 tablet; Refill: 1    6. Other transient cerebral ischemic attacks and related syndromes  -     CT Angiogram Neck; Future  -     CT Angiogram Head; Future    7. History of CVA (cerebrovascular accident)  -     CT Angiogram Neck; Future  -     CT Angiogram Head; Future    Total time of visit today 40 minutes which including reviewing PCP notes, Cardiology notes, ER notes, obtaining additional history, completing exam, discussing findings and recommendations in  detail including the following:  Due to recent transient spell of numbness, heaviness on right side and gait difficulties, we have recommended and he is agreeable to CTA head and neck to r/o cerebral artery, vertebral artery or carotid artery stenosis that could contribute to these symptoms. We reviewed MRI Brain and T spine. Continue home PT exercises. Continue current meds. Follow up in 6 months or sooner if needed. If he has another episode as described recently, I have recommended he proceed to ER for further evaluation.    Reviewed medications, potential side effects and signs and symptoms to report. Discussed risk versus benefits of treatment plan with patient and/or family-including medications, labs and radiology that may be ordered. Addressed questions and concerns during visit. Patient and/or family verbalized understanding and agree with plan.    As part of this patient's treatment plan I am prescribing controlled substances. The patient has been made aware of appropriate use of such medications, including potential risk of somnolence, limited ability to drive and/or work safely, and potential for dependence or overdose. It has also been made clear that these medications are for use by the patient only, without concomitant use of alcohol or other substances unless prescribed. Keep out of reach of children.  Shoaib report has been reviewed. If this is going to be prescribed long term, [facility] Controlled Substance Agreement Contract has also been read and signed by patient and myself.    Discussed signs and symptoms of stroke and when to call 911. Instructed to follow a low fat diet including the Mediterranean diet. Instructed to take all medications daily as prescribed. Encouraged 30 minutes of exercise 3-4 times a week as tolerated. Stay well hydrated. Discussed potential side effects of new medications and signs and symptoms to report. If patient is currently using tobacco products, smoking cessation was  encouraged. Reviewed stroke risk factors and stroke prevention plan. Patient and/or family verbalizes understanding and agrees with plan.     During this visit the following were done:  Labs Reviewed [x]    Labs Ordered []    Radiology Reports Reviewed [x]    Radiology Ordered [x]    PCP Records Reviewed [x]    Referring Provider Records Reviewed []    ER Records Reviewed []    Hospital Records Reviewed []    History Obtained From Family []    Radiology Images Reviewed [x]    Other Reviewed [x]  Cardiology notes reviewed  Records Requested []      Transcribed from ambient dictation for BRENDON Abrams by Linda Calixto.  04/28/23   16:35 EDT    Patient or patient representative verbalized consent to the visit recording.  I have personally performed the services described in this document as transcribed by the above individual, and it is both accurate and complete.  BRENDON Abrams  4/29/2023  16:00 EDT

## 2023-05-22 DIAGNOSIS — Z79.4 CONTROLLED TYPE 2 DIABETES MELLITUS WITHOUT COMPLICATION, WITH LONG-TERM CURRENT USE OF INSULIN: ICD-10-CM

## 2023-05-22 DIAGNOSIS — E11.9 CONTROLLED TYPE 2 DIABETES MELLITUS WITHOUT COMPLICATION, WITH LONG-TERM CURRENT USE OF INSULIN: ICD-10-CM

## 2023-05-22 DIAGNOSIS — E78.00 PURE HYPERCHOLESTEROLEMIA: ICD-10-CM

## 2023-05-22 RX ORDER — ROSUVASTATIN CALCIUM 40 MG/1
40 TABLET, COATED ORAL DAILY
Qty: 90 TABLET | Refills: 3 | Status: SHIPPED | OUTPATIENT
Start: 2023-05-22

## 2023-05-31 ENCOUNTER — TELEPHONE (OUTPATIENT)
Dept: FAMILY MEDICINE CLINIC | Facility: CLINIC | Age: 68
End: 2023-05-31

## 2023-05-31 DIAGNOSIS — Z79.4 CONTROLLED TYPE 2 DIABETES MELLITUS WITHOUT COMPLICATION, WITH LONG-TERM CURRENT USE OF INSULIN: ICD-10-CM

## 2023-05-31 DIAGNOSIS — E11.9 CONTROLLED TYPE 2 DIABETES MELLITUS WITHOUT COMPLICATION, WITH LONG-TERM CURRENT USE OF INSULIN: ICD-10-CM

## 2023-05-31 NOTE — TELEPHONE ENCOUNTER
Caller: Jose D Bailey    Relationship: Self    Best call back number: [phone]    Requested Prescriptions:   [facility]     Pharmacy where request should be sent: [facility] [id number] [address] - [phone]  - [phone] FX     Last office visit with prescribing clinician: 3/1/2023   Last telemedicine visit with prescribing clinician: Visit date not found   Next office visit with prescribing clinician: 6/7/2023     Additional details provided by patient: PATIENT IS OUT     Does the patient have less than a 3 day supply:  [x] Yes  [] No    Would you like a call back once the refill request has been completed: [] Yes [x] No    If the office needs to give you a call back, can they leave a voicemail: [] Yes [x] No    Cadance Dunaway, RegSched Rep   05/31/23 09:57 EDT

## 2023-06-07 ENCOUNTER — OFFICE VISIT (OUTPATIENT)
Dept: FAMILY MEDICINE CLINIC | Facility: CLINIC | Age: 68
End: 2023-06-07
Payer: COMMERCIAL

## 2023-06-07 VITALS
HEIGHT: 66 IN | DIASTOLIC BLOOD PRESSURE: 72 MMHG | HEART RATE: 76 BPM | TEMPERATURE: 97.7 F | BODY MASS INDEX: 19.8 KG/M2 | SYSTOLIC BLOOD PRESSURE: 132 MMHG | WEIGHT: 123.2 LBS

## 2023-06-07 DIAGNOSIS — Z79.4 CONTROLLED TYPE 2 DIABETES MELLITUS WITHOUT COMPLICATION, WITH LONG-TERM CURRENT USE OF INSULIN: ICD-10-CM

## 2023-06-07 DIAGNOSIS — E11.9 CONTROLLED TYPE 2 DIABETES MELLITUS WITHOUT COMPLICATION, WITH LONG-TERM CURRENT USE OF INSULIN: ICD-10-CM

## 2023-06-07 DIAGNOSIS — I10 ESSENTIAL HYPERTENSION: ICD-10-CM

## 2023-06-07 LAB
EXPIRATION DATE: NORMAL
HBA1C MFR BLD: 6.5 %
Lab: NORMAL

## 2023-06-07 RX ORDER — INSULIN GLARGINE 100 [IU]/ML
5 INJECTION, SOLUTION SUBCUTANEOUS DAILY
Qty: 4.5 ML | Refills: 0 | Status: SHIPPED | OUTPATIENT
Start: 2023-06-07 | End: 2023-09-05

## 2023-06-07 RX ORDER — LISINOPRIL 10 MG/1
10 TABLET ORAL 2 TIMES DAILY
Qty: 180 TABLET | Refills: 3 | Status: SHIPPED | OUTPATIENT
Start: 2023-06-07

## 2023-06-07 NOTE — PROGRESS NOTES
Follow Up Office Visit      Patient Name: Jose D Bailey  : 1955   MRN: [id number]     Chief Complaint:    Chief Complaint   Patient presents with    Diabetes       History of Present Illness: Jose D Bailey is a 68 y.o. male who is here today to follow up with diabetes with insulin use. He is doing well without low blood sugars. He doesn't check his blood sugar but says that he doesn't have symptoms.     Patient is doing well on Trulicity without side effect.  He does report that he needed a prior authorization but it is affordable.    He is followed by neurology for his history of stroke.      Physical exam: Heart exam and lung exam normal.  Heart RRR.  Lung exam CTA bilateral.      Subjective        I have reviewed and the following portions of the patient's history were updated as appropriate: past family history, past medical history, past social history, past surgical history and problem list.    Medications:     Current Outpatient Medications:     Lantus 100 UNIT/ML injection, Inject 5 Units under the skin into the appropriate area as directed Daily for 90 days., Disp: 4.5 mL, Rfl: 0    lisinopril (PRINIVIL,ZESTRIL) 10 MG tablet, Take 1 tablet by mouth 2 (Two) Times a Day., Disp: 180 tablet, Rfl: 3    aspirin 81 MG tablet, Take 1 tablet by mouth Daily., Disp: , Rfl:     baclofen (LIORESAL) 10 MG tablet, Take 1-2 tablets by mouth 2 (Two) Times a Day As Needed for Muscle Spasms., Disp: 180 tablet, Rfl: 3    bisoprolol (ZEBeta) 10 MG tablet, Take  by mouth Daily., Disp: , Rfl:     Dulaglutide 0.75 MG/0.5ML solution pen-injector, Inject 0.75 mg under the skin into the appropriate area as directed 1 (One) Time Per Week., Disp: 2 mL, Rfl: 2    empagliflozin (Jardiance) 25 MG tablet tablet, Take 1 tablet by mouth Daily., Disp: 90 tablet, Rfl: 3    gabapentin (NEURONTIN) 600 MG tablet, Take 1 tablet by mouth 2 (Two) Times a Day., Disp: 180 tablet, Rfl: 1    glucose blood test strip, Check glucose twice a  "day., Disp: 50 each, Rfl: 7    glucose monitor monitoring kit, Check glucose twice a day., Disp: 1 each, Rfl: 0    Insulin Syringe-Needle U-100 (TRUEplus Insulin Syringe) 31G X 5/16\" 1 ML misc, 1 each by Other route Daily. use to inject insulin once daily (Patient taking differently: 1 each by Other route Daily. use to inject insulin once , 20 units), Disp: 100 each, Rfl: 2    metFORMIN (Glucophage) 1000 MG tablet, Take 1 tablet by mouth 2 (Two) Times a Day With Meals., Disp: 180 tablet, Rfl: 1    rosuvastatin (CRESTOR) 40 MG tablet, Take 1 tablet by mouth Daily., Disp: 90 tablet, Rfl: 3    Allergies:   Allergies   Allergen Reactions    Sulfa Antibiotics Hives    Atorvastatin Myalgia    Pravachol [Pravastatin Sodium] Myalgia    Pravastatin Myalgia    Simvastatin Myalgia    Pseudoephedrine Rash       Objective     Physical Exam: Please see above  Vital Signs:   Vitals:    06/07/23 0858   BP: 132/72   Pulse: 76   Temp: 97.7 °F (36.5 °C)   Weight: 55.9 kg (123 lb 3.2 oz)   Height: 167.6 cm (66\")     Body mass index is 19.89 kg/m².          Assessment / Plan      Assessment/Plan:   Diagnoses and all orders for this visit:    1. Controlled type 2 diabetes mellitus without complication, with long-term current use of insulin  -     POC Glycosylated Hemoglobin (Hb A1C)  -     Lantus 100 UNIT/ML injection; Inject 5 Units under the skin into the appropriate area as directed Daily for 90 days.  Dispense: 4.5 mL; Refill: 0    2. Essential hypertension  -     lisinopril (PRINIVIL,ZESTRIL) 10 MG tablet; Take 1 tablet by mouth 2 (Two) Times a Day.  Dispense: 180 tablet; Refill: 3    Patient's diabetes still well controlled.  Were going to decrease insulin by another 5 units.  Increase Trulicity next visit if patient is doing well.  Goal is to wean off of insulin as able    Patient's nephrologist has changed the lisinopril dosing to twice daily dosing.  This was reportedly due to microalbuminuria.  We will refill his medication to " that effect.  Blood pressure looks well controlled today.      Diabetes addressed with medication change-level 4 visit    Follow Up:   Return in about 3 months (around 9/7/2023) for Recheck.    Adalberto Ablarado DO  [facility] [place]

## 2023-06-19 PROBLEM — E11.9 TYPE 2 DIABETES MELLITUS: Status: ACTIVE | Noted: 2023-06-19

## 2023-06-19 PROBLEM — E11.9 TYPE 2 DIABETES MELLITUS: Status: RESOLVED | Noted: 2023-06-19 | Resolved: 2023-06-19

## 2023-06-19 PROBLEM — R91.1 LUNG NODULE: Status: ACTIVE | Noted: 2023-06-19

## 2023-06-19 PROBLEM — R73.03 PREDIABETES: Status: RESOLVED | Noted: 2023-04-19 | Resolved: 2023-06-19

## 2023-06-19 PROBLEM — N20.0 LEFT NEPHROLITHIASIS: Status: ACTIVE | Noted: 2023-06-19

## 2023-06-19 PROBLEM — N17.9 AKI (ACUTE KIDNEY INJURY): Status: ACTIVE | Noted: 2023-06-19

## 2023-07-17 ENCOUNTER — TELEPHONE (OUTPATIENT)
Dept: FAMILY MEDICINE CLINIC | Facility: CLINIC | Age: 68
End: 2023-07-17

## 2023-07-17 NOTE — TELEPHONE ENCOUNTER
Caller: Jose D Bailey    Relationship: Self    Best call back number: / SPOUSE:  TL    What is the best time to reach you: ANYTIME    Who are you requesting to speak with (clinical staff, provider,  specific staff member):CLINICAL STAFF    Do you know the name of the person who called: JOSE D    What was the call regarding:  PATIENT IS HAVING ADDITIONAL TESTING  (CT SCAN) ON WED, AND IT WAS SCHEDULED WITHOUT ENHANCEMENT, PATIENT HAS CONCERNS, AND AFTER TESTING ON FRIDAY, THEY WERE SUGGESTING THIS BE DONE WITH ENHANCEMENT.  HE WOULD LIKE DR NAYLOR TO REVIEW AND LET HIM KNOW       Is it okay if the provider responds through [employer]: PREFERS CALL BACK

## 2023-07-18 NOTE — TELEPHONE ENCOUNTER
I called and spoke to the wife after verifying that she had permission. I gave her the below message and she voiced understanding.

## 2023-07-24 ENCOUNTER — TELEPHONE (OUTPATIENT)
Dept: FAMILY MEDICINE CLINIC | Facility: CLINIC | Age: 68
End: 2023-07-24
Payer: COMMERCIAL

## 2023-07-24 NOTE — TELEPHONE ENCOUNTER
Pharmacy in [place] has requested a refill for LANTUS 100 UNIT/ML VIAL  Pharmacy number is [phone]

## 2023-08-03 ENCOUNTER — TELEPHONE (OUTPATIENT)
Dept: FAMILY MEDICINE CLINIC | Facility: CLINIC | Age: 68
End: 2023-08-03
Payer: COMMERCIAL

## 2023-08-15 ENCOUNTER — HOSPITAL ENCOUNTER (OUTPATIENT)
Dept: CT IMAGING | Facility: HOSPITAL | Age: 68
Discharge: HOME | End: 2023-08-15
Admitting: FAMILY MEDICINE
Payer: COMMERCIAL

## 2023-08-15 DIAGNOSIS — R59.0 HILAR LYMPHADENOPATHY: ICD-10-CM

## 2023-08-15 LAB — CREAT BLDA-MCNC: 1.3 MG/DL (ref 0.6–1.3)

## 2023-08-15 PROCEDURE — 71260 CT THORAX DX C+: CPT

## 2023-08-15 PROCEDURE — 82565 ASSAY OF CREATININE: CPT

## 2023-08-15 PROCEDURE — 25510000001 IOPAMIDOL 61 % SOLUTION: Performed by: FAMILY MEDICINE

## 2023-08-15 RX ADMIN — IOPAMIDOL 85 ML: 612 INJECTION, SOLUTION INTRAVENOUS at 11:01

## 2023-08-16 DIAGNOSIS — R91.1 LUNG NODULE: Primary | ICD-10-CM

## 2023-08-22 DIAGNOSIS — E11.9 CONTROLLED TYPE 2 DIABETES MELLITUS WITHOUT COMPLICATION, WITH LONG-TERM CURRENT USE OF INSULIN: ICD-10-CM

## 2023-08-22 DIAGNOSIS — Z79.4 CONTROLLED TYPE 2 DIABETES MELLITUS WITHOUT COMPLICATION, WITH LONG-TERM CURRENT USE OF INSULIN: ICD-10-CM

## 2023-08-22 NOTE — TELEPHONE ENCOUNTER
Rx Refill Note  Requested Prescriptions     Pending Prescriptions Disp Refills    Dulaglutide 0.75 MG/0.5ML solution pen-injector 2 mL 2     Sig: Inject 0.75 mg under the skin into the appropriate area as directed 1 (One) Time Per Week.      Last office visit with prescribing clinician: 6/28/2023   Last telemedicine visit with prescribing clinician: Visit date not found   Next office visit with prescribing clinician: 9/6/2023                         Would you like a call back once the refill request has been completed: [] Yes [] No    If the office needs to give you a call back, can they leave a voicemail: [] Yes [] No    Jennifer Hayward MA  08/22/23, 09:37 EDT

## 2023-09-06 ENCOUNTER — OFFICE VISIT (OUTPATIENT)
Dept: FAMILY MEDICINE CLINIC | Facility: CLINIC | Age: 68
End: 2023-09-06
Payer: COMMERCIAL

## 2023-09-06 VITALS
TEMPERATURE: 98 F | HEART RATE: 78 BPM | SYSTOLIC BLOOD PRESSURE: 118 MMHG | BODY MASS INDEX: 19.09 KG/M2 | OXYGEN SATURATION: 98 % | HEIGHT: 66 IN | WEIGHT: 118.8 LBS | DIASTOLIC BLOOD PRESSURE: 62 MMHG

## 2023-09-06 DIAGNOSIS — Z79.4 CONTROLLED TYPE 2 DIABETES MELLITUS WITHOUT COMPLICATION, WITH LONG-TERM CURRENT USE OF INSULIN: Primary | ICD-10-CM

## 2023-09-06 DIAGNOSIS — I10 ESSENTIAL HYPERTENSION: ICD-10-CM

## 2023-09-06 DIAGNOSIS — E11.9 CONTROLLED TYPE 2 DIABETES MELLITUS WITHOUT COMPLICATION, WITH LONG-TERM CURRENT USE OF INSULIN: Primary | ICD-10-CM

## 2023-09-06 LAB
EXPIRATION DATE: NORMAL
EXPIRATION DATE: NORMAL
HBA1C MFR BLD: 7.4 %
Lab: NORMAL
Lab: NORMAL
POC CREATININE URINE: 50
POC MICROALBUMIN URINE: 150

## 2023-09-06 RX ORDER — LISINOPRIL 2.5 MG/1
2.5 TABLET ORAL DAILY
Qty: 90 TABLET | Refills: 2 | Status: SHIPPED | OUTPATIENT
Start: 2023-09-06

## 2023-09-06 RX ORDER — LISINOPRIL 10 MG/1
TABLET ORAL
COMMUNITY
End: 2023-09-06

## 2023-09-06 NOTE — PROGRESS NOTES
Follow Up Office Visit      Patient Name: Jose D Bailey  : 1955   MRN: [id number]     Chief Complaint:    Chief Complaint   Patient presents with    Hypertension     Thinks he may be passing kidney stones. Nephrologist wants him back on lisinopril but it was making his B/P so low he was passing out. Wants your opinion what to do.       History of Present Illness: Jose D Bailey is a 68 y.o. male who is here today to follow up with CKD, neuropathic symptoms, hyperlipidemia, hypertension, and diabetes.  Patient is here today to follow-up regarding diabetes.  Patient reports that his blood pressure has been very low while being on the 10 mg lisinopril.  His nephrologist does want him to continue taking the lisinopril.  He has been off of lisinopril recently.  His blood pressure is well controlled.  He is on Jardiance and Trulicity and metformin for dry diabetes.  He stopped insulin.  Patient has noticed that he is lost little bit of weight after stopping insulin.  Patient reports doing fairly well with his diet and medications otherwise.  He does not feel like his diet has changed since being on Trulicity.  He has lost 10 pounds recently.      Patient is doing well overall except for the blood pressure issues.  He wants to stay on the lisinopril as directed by nephrology, but he cannot tolerate the medication at the current dose.      Of note, the patient's wife is here today.      Physical exam: Patient is heart exam RRR.  Lung exam CTA bilateral.      Subjective        I have reviewed and the following portions of the patient's history were updated as appropriate: past family history, past medical history, past social history, past surgical history and problem list.    Medications:     Current Outpatient Medications:     aspirin 81 MG tablet, Take 1 tablet by mouth Daily., Disp: , Rfl:     baclofen (LIORESAL) 10 MG tablet, Take 1-2 tablets by mouth 2 (Two) Times a Day As Needed for Muscle Spasms., Disp: 180  "tablet, Rfl: 3    bisoprolol (ZEBeta) 5 MG tablet, Take 1 tablet by mouth Daily., Disp: , Rfl:     Dulaglutide 0.75 MG/0.5ML solution pen-injector, Inject 0.75 mg under the skin into the appropriate area as directed 1 (One) Time Per Week., Disp: 2 mL, Rfl: 2    empagliflozin (Jardiance) 25 MG tablet tablet, Take 1 tablet by mouth Daily., Disp: 90 tablet, Rfl: 3    gabapentin (NEURONTIN) 600 MG tablet, Take 1 tablet by mouth 2 (Two) Times a Day., Disp: 180 tablet, Rfl: 1    glucose blood test strip, Check glucose twice a day., Disp: 50 each, Rfl: 7    glucose monitor monitoring kit, Check glucose twice a day., Disp: 1 each, Rfl: 0    metFORMIN (Glucophage) 1000 MG tablet, Take 1 tablet by mouth 2 (Two) Times a Day With Meals., Disp: 180 tablet, Rfl: 1    rosuvastatin (CRESTOR) 40 MG tablet, Take 1 tablet by mouth Daily., Disp: 90 tablet, Rfl: 3    lisinopril (PRINIVIL,ZESTRIL) 2.5 MG tablet, Take 1 tablet by mouth Daily., Disp: 90 tablet, Rfl: 2    Allergies:   Allergies   Allergen Reactions    Sulfa Antibiotics Hives    Atorvastatin Myalgia    Pravachol [Pravastatin Sodium] Myalgia    Pravastatin Myalgia    Simvastatin Myalgia    Pseudoephedrine Rash       Objective     Physical Exam: Please see above  Vital Signs:   Vitals:    09/06/23 0952   BP: 118/62   Pulse: 78   Temp: 98 °F (36.7 °C)   SpO2: 98%   Weight: 53.9 kg (118 lb 12.8 oz)   Height: 167.6 cm (66\")     Body mass index is 19.17 kg/m².          Assessment / Plan      Assessment/Plan:   Diagnoses and all orders for this visit:    1. Controlled type 2 diabetes mellitus without complication, with long-term current use of insulin (Primary)  -     POC Glycosylated Hemoglobin (Hb A1C)  -     POCT microalbumin  -     metFORMIN (Glucophage) 1000 MG tablet; Take 1 tablet by mouth 2 (Two) Times a Day With Meals.  Dispense: 180 tablet; Refill: 1    2. Essential hypertension  -     lisinopril (PRINIVIL,ZESTRIL) 2.5 MG tablet; Take 1 tablet by mouth Daily.  Dispense: " 90 tablet; Refill: 2    Patient's diabetes is still controlled despite the increase in A1c.  Patient's A1c goal is below 8.  He is doing well at this time.  Patient to follow-up in 3 months for repeat A1c.  Hopefully he will be closer to 7.  Patient's treatment includes Trulicity, Jardiance and metformin.  Patient continues to lose weight while on Trulicity, we will have to stop it.  Patient can stay off of insulin for now.  If patient's A1c gets closer to 8, then we will increase Trulicity at that time.  At this time we will continue current dose of Trulicity.    Hypertension overtreated with 10 mg of lisinopril.  Recommend decreasing dose to 2.5 mg for protection of kidneys and heart.  Can follow-up with nephrologist as needed      Follow Up:   Return in about 3 months (around 12/6/2023) for Recheck.    Adalberto Albarado DO  [facility] [place]

## 2023-11-08 DIAGNOSIS — G51.39 FACIAL SPASM: ICD-10-CM

## 2023-11-08 DIAGNOSIS — E11.42 DIABETIC PERIPHERAL NEUROPATHY: ICD-10-CM

## 2023-11-08 DIAGNOSIS — G89.0 CENTRAL PAIN SYNDROME: ICD-10-CM

## 2023-11-08 RX ORDER — GABAPENTIN 600 MG/1
600 TABLET ORAL 2 TIMES DAILY
Qty: 60 TABLET | Refills: 1 | Status: SHIPPED | OUTPATIENT
Start: 2023-11-08

## 2023-11-08 NOTE — TELEPHONE ENCOUNTER
Rx Refill Note  Requested Prescriptions     Pending Prescriptions Disp Refills    gabapentin (NEURONTIN) 600 MG tablet [Pharmacy Med Name: GABAPENTIN 600 MG TABLET] 60 tablet      Sig: TAKE ONE TABLET BY MOUTH TWICE A DAY      Last office visit with prescribing clinician: 4/28/2023   Last telemedicine visit with prescribing clinician: Visit date not found   Next office visit with prescribing clinician: 11/30/2023                         Would you like a call back once the refill request has been completed: [] Yes [] No    If the office needs to give you a call back, can they leave a voicemail: [] Yes [] No    Dorothy Salas CMA  11/08/23, 14:48 EST

## 2023-11-08 NOTE — TELEPHONE ENCOUNTER
Rx Refill Note  Requested Prescriptions     Pending Prescriptions Disp Refills    gabapentin (NEURONTIN) 600 MG tablet [Pharmacy Med Name: GABAPENTIN 600 MG TABLET] 60 tablet      Sig: TAKE ONE TABLET BY MOUTH TWICE A DAY      Last office visit with prescribing clinician: 4/28/2023   Last telemedicine visit with prescribing clinician: Visit date not found   Next office visit with prescribing clinician: 11/30/2023                         Would you like a call back once the refill request has been completed: [] Yes [] No    If the office needs to give you a call back, can they leave a voicemail: [] Yes [] No    Dorothy Salas CMA  11/08/23, 14:49 EST

## 2023-11-08 NOTE — TELEPHONE ENCOUNTER
Caller: ESTHER QUIROZ    Relationship: SELF    Best call back number: [phone]    Requested Prescriptions:   Requested Prescriptions     Pending Prescriptions Disp Refills    gabapentin (NEURONTIN) 600 MG tablet [Pharmacy Med Name: GABAPENTIN 600 MG TABLET] 60 tablet      Sig: TAKE ONE TABLET BY MOUTH TWICE A DAY        Pharmacy where request should be sent: [facility] [id number] - [phone]  - [phone]     Last office visit with prescribing clinician: 4/28/2023   Last telemedicine visit with prescribing clinician: Visit date not found   Next office visit with prescribing clinician: 11/30/2023     Additional details provided by patient: PT CALLED TO SAY THERE ARE NO REFILLS LEFT.    Does the patient have less than a 3 day supply:  [x] Yes  [] No    Would you like a call back once the refill request has been completed: [] Yes [] No    If the office needs to give you a call back, can they leave a voicemail: [] Yes [] No    Karina Whitlock Rep   11/08/23 14:30 EST

## 2023-11-13 NOTE — PROGRESS NOTES
Subjective:     Encounter Date:11/22/2023      Patient ID: Jose D Bailey is a 68 y.o.   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       REFERRING PHYSICIAN: Clinton Day MD  CURRENT PHYSICIAN: Adalberto Albarado DO  NEUROLOGIST:  BRENDON Pagan   UROLOGIST: Dr. Morin, Montana Bajwa MD  GASTROENTEROLOGIST: Ruperto Barroso MD  NEPHROLOGIST: [facility]  ORTHOPEDIC SURGEON: Roly Aguilera MD.    Chief Complaint:   Chief Complaint   Patient presents with    Hypertensive heart disease without heart failure     Problem List:  Probable hypertensive cardiovascular disease:  Apparent remote screening 2D echocardiogram, data deficit ([facility]), approximately 2005.   Apparent unremarkable serial treadmill GXT/Cardiolite GXT, data deficit ([facility], serially every 3-4 years x14 years).   Abnormal screening treadmill GXT showing physical deconditioning (accelerated HR with 107% age-predicted MHR with 50% PEC) with post-exercise EKG changes in the absence of anginal type chest pain, as well as frequent PVCs/PACs during exercise without complex forms; without accelerated blood pressure ([facility]), 04/04/12.   Acceptable IV LEXIscan Cardiolite study, LVEF (0.68) with residual class I symptoms, June 2012.  Echocardiogram 6/27/2023: LVEF 60%, cardiac valves anatomically and functionally normal.  Residual class I symptoms, July 2020, July 2021, March 2022, September 2022, April 2023, November 2023  Hypertension with remote suboptimal control, improved.  Dyslipidemia, maintained on statin.   Uncontrolled type 2 insulin dependent diabetes (insulin dependent since 2009); diagnosed approximately 1998, hemoglobin A1c 8.5% (November 2016); hemoglobin A1c 9.2% (September 2017); hemoglobin A1c 7.3% (September 2018); 7.7% (May 2019), 7.1% May 2020, 7.7% July 2021, 5.5% June 2022,  6.3% September 2022, 5.6% March 2023, 7.4% September 2023.  Overweight, BMI 29.1  Bilateral plantar fasciitis; utilizing orthotics.   Cataract extraction (OD, 2009; OS, 2011).   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  Presyncope with OSH ED visit in [place] November 2022, patient left AMA, hypotensive and dehydrated  [facility] 2-day hospitalization June 2023 for syncope and collapse with left nephrolithiasis.  Patient is status post cystoscopy.  Syncope felt to be due to dehydration and orthostatic blood pressure.  Pulmonary nodules with stable CT chest August 2023    Allergies   Allergen Reactions    Sulfa Antibiotics Hives    Atorvastatin Myalgia    Pravachol [Pravastatin Sodium] Myalgia    Pravastatin Myalgia    Simvastatin Myalgia    Pseudoephedrine Rash       Current Outpatient Medications   Medication Instructions    aspirin 81 mg, Oral, Daily    baclofen (LIORESAL) 10-20 mg, Oral, 2 Times Daily PRN    bisoprolol (ZEBETA) 2.5 mg, Oral, Daily    Dulaglutide 0.75 mg, Subcutaneous, Weekly    empagliflozin (JARDIANCE) 25 mg, Oral, Daily    gabapentin (NEURONTIN) 600 mg, Oral, 2 Times Daily    glucose blood test strip Check glucose twice a day.    glucose monitor monitoring kit Check glucose twice a day.    lisinopril (PRINIVIL,ZESTRIL) 2.5 mg, Oral, Daily    metFORMIN (GLUCOPHAGE) 1,000 mg, Oral, 2 Times Daily With Meals    rosuvastatin (CRESTOR) 40 mg, Oral,  "Daily         HISTORY OF PRESENT ILLNESS:  The patient is here for 7-month follow-up.  In the interim the patient had 2-day [facility] hospitalization June 2023 for syncope and collapse with left nephrolithiasis.  Patient had a normal echocardiogram June 2023.  His syncopal episode was felt to be from orthostatic blood pressure and dehydration.  The patient says that he had been at his grandson's baseball games all day.  When EMS arrived, his blood pressure, heart rate, and glucose levels were all WNL.  Patient said that he had been reaching up into his cabinet to get some Tylenol when he passed out.  He was unaware of having a kidney stone but was told that it was the size of a pencil eraser.  The patient denies any chest pain, shortness of breath, palpitations, and has had no recurrent syncopal episodes.  He also denies having any dizziness or presyncope.  He is still working but his family limits his physical activities at home. They told him since he had a stroke in 2016 that he is unable to get on ladders anymore and he does not mow the lawn anymore but can use a riding lawnmower.      ROS   All other systems reviewed and otherwise negative.    Procedures       Objective:       Vitals:    11/22/23 1328 11/22/23 1339   BP: 122/60 120/60   BP Location: Left arm Left arm   Patient Position: Sitting Standing   Cuff Size: Adult Adult   Pulse: 64    SpO2: 99% 99%   Weight: 54.2 kg (119 lb 6.4 oz)    Height: 167.6 cm (66\")      Body mass index is 19.27 kg/m².  Last weight April 2023 was 122 pounds  Constitutional:       Appearance: Healthy appearance. Not in distress.   Neck:      Vascular: No JVR. JVD normal.   Pulmonary:      Effort: Pulmonary effort is normal.      Breath sounds: Normal breath sounds. No wheezing. No rhonchi. No rales.   Chest:      Chest wall: Not tender to palpatation.   Cardiovascular:      PMI at left midclavicular line. Normal rate. Regular rhythm. Normal S1. Normal S2.       Murmurs: There is a " grade 1/6 systolic murmur at the URSB.      No gallop.  No click. No rub.   Pulses:     Intact distal pulses.   Edema:     Peripheral edema absent.   Abdominal:      General: Bowel sounds are normal.      Palpations: Abdomen is soft.      Tenderness: There is no abdominal tenderness.   Musculoskeletal: Normal range of motion.         General: No tenderness. Skin:     General: Skin is warm and dry.   Neurological:      General: No focal deficit present.      Mental Status: Alert and oriented to person, place and time.           Lab Review:   Lab Results   Component Value Date    GLUCOSE 125 (H) 06/21/2023    BUN 18 06/21/2023    CREATININE 1.30 08/15/2023    EGFRIFNONA 59 (L) 04/06/2021    BCR 14.5 06/21/2023    CO2 18.0 (L) 06/21/2023    CALCIUM 9.0 06/21/2023    ALBUMIN 3.3 (L) 06/20/2023    AST 17 06/20/2023    ALT 9 06/20/2023       Lab Results   Component Value Date    WBC 8.10 06/20/2023    HGB 13.0 06/20/2023    HCT 39.7 06/20/2023    MCV 93.9 06/20/2023     06/20/2023       Lab Results   Component Value Date    HGBA1C 7.4 09/06/2023       Lab Results   Component Value Date    TSH 1.100 02/27/2020       Lab Results   Component Value Date    CHOL 115 06/16/2022    CHOL 126 04/06/2021     Lab Results   Component Value Date    TRIG 83 06/16/2022    TRIG 74 04/06/2021     Lab Results   Component Value Date    HDL 46 06/16/2022    HDL 42 04/06/2021     Lab Results   Component Value Date    LDL 53 06/16/2022    LDL 69 04/06/2021     Advance Care Planning   ACP discussion was held with the patient during this visit. Patient does not have an advance directive, declines further assistance.          Assessment:     Overall continued acceptable course with no new interim cardiopulmonary complaints with acceptable functional status. We will defer additional diagnostic or therapeutic intervention from a cardiac perspective at this time. Patient had a normal echocardiogram June 2023.  The patient's syncopal episode  was an isolated event.  If he has any recurrent dizziness, presyncope, or syncope, I would consider E patch versus ILR +/- tilt table test, stress test.     Diagnosis Plan   1. Hypertensive heart disease without heart failure  No recurrent angina pectoris or CHF on current activity schedule; continue current treatment , Patient had a normal echocardiogram June 2023.      2. Primary hypertension  Controlled, continue current cardiac medications      3. Pure hypercholesterolemia  No new labs to review, continue rosuvastatin      4. Syncope, unspecified syncope type  No recurrent syncopal episodes             Plan:         Patient to continue current medications and close follow up with the above providers.  Tentative cardiology follow up in May 2024 or patient may return sooner PRN.    Electronically signed by BRENDON Soria, 11/22/23, 2:03 PM EST.

## 2023-11-22 ENCOUNTER — OFFICE VISIT (OUTPATIENT)
Dept: CARDIOLOGY | Facility: CLINIC | Age: 68
End: 2023-11-22
Payer: COMMERCIAL

## 2023-11-22 VITALS
SYSTOLIC BLOOD PRESSURE: 120 MMHG | HEART RATE: 64 BPM | HEIGHT: 66 IN | BODY MASS INDEX: 19.19 KG/M2 | DIASTOLIC BLOOD PRESSURE: 60 MMHG | WEIGHT: 119.4 LBS | OXYGEN SATURATION: 99 %

## 2023-11-22 DIAGNOSIS — I10 PRIMARY HYPERTENSION: ICD-10-CM

## 2023-11-22 DIAGNOSIS — I11.9 HYPERTENSIVE HEART DISEASE WITHOUT HEART FAILURE: Primary | ICD-10-CM

## 2023-11-22 DIAGNOSIS — R55 SYNCOPE, UNSPECIFIED SYNCOPE TYPE: ICD-10-CM

## 2023-11-22 DIAGNOSIS — E78.00 PURE HYPERCHOLESTEROLEMIA: ICD-10-CM

## 2023-11-22 DIAGNOSIS — I10 ESSENTIAL HYPERTENSION: ICD-10-CM

## 2023-11-22 PROCEDURE — 99214 OFFICE O/P EST MOD 30 MIN: CPT | Performed by: NURSE PRACTITIONER

## 2023-11-22 RX ORDER — BISOPROLOL FUMARATE 5 MG/1
2.5 TABLET, FILM COATED ORAL DAILY
Qty: 45 TABLET | Refills: 3 | Status: SHIPPED | OUTPATIENT
Start: 2023-11-22

## 2023-11-22 RX ORDER — ROSUVASTATIN CALCIUM 40 MG/1
40 TABLET, COATED ORAL DAILY
Qty: 90 TABLET | Refills: 3 | Status: SHIPPED | OUTPATIENT
Start: 2023-11-22

## 2023-11-22 RX ORDER — LISINOPRIL 2.5 MG/1
2.5 TABLET ORAL DAILY
Qty: 90 TABLET | Refills: 3 | Status: SHIPPED | OUTPATIENT
Start: 2023-11-22

## 2023-11-27 DIAGNOSIS — E11.9 CONTROLLED TYPE 2 DIABETES MELLITUS WITHOUT COMPLICATION, WITH LONG-TERM CURRENT USE OF INSULIN: ICD-10-CM

## 2023-11-27 DIAGNOSIS — Z79.4 CONTROLLED TYPE 2 DIABETES MELLITUS WITHOUT COMPLICATION, WITH LONG-TERM CURRENT USE OF INSULIN: ICD-10-CM

## 2023-11-30 ENCOUNTER — OFFICE VISIT (OUTPATIENT)
Dept: NEUROLOGY | Facility: CLINIC | Age: 68
End: 2023-11-30
Payer: COMMERCIAL

## 2023-11-30 VITALS
BODY MASS INDEX: 18.96 KG/M2 | HEART RATE: 81 BPM | DIASTOLIC BLOOD PRESSURE: 60 MMHG | SYSTOLIC BLOOD PRESSURE: 120 MMHG | WEIGHT: 118 LBS | HEIGHT: 66 IN | OXYGEN SATURATION: 98 %

## 2023-11-30 DIAGNOSIS — G51.39 FACIAL SPASM: ICD-10-CM

## 2023-11-30 DIAGNOSIS — G89.0 CENTRAL PAIN SYNDROME: ICD-10-CM

## 2023-11-30 DIAGNOSIS — I69.90 LATE EFFECTS OF CVA (CEREBROVASCULAR ACCIDENT): ICD-10-CM

## 2023-11-30 DIAGNOSIS — E11.42 DIABETIC PERIPHERAL NEUROPATHY: Primary | ICD-10-CM

## 2023-11-30 PROCEDURE — 99214 OFFICE O/P EST MOD 30 MIN: CPT | Performed by: NURSE PRACTITIONER

## 2023-11-30 RX ORDER — GABAPENTIN 600 MG/1
600 TABLET ORAL 2 TIMES DAILY
Qty: 180 TABLET | Refills: 1 | Status: SHIPPED | OUTPATIENT
Start: 2023-11-30

## 2023-11-30 NOTE — LETTER
November 30, 2023     Adalberto Albarado DO  [address] [address]    Patient: Jose D Bailey   YOB: 1955   Date of Visit: 11/30/2023       Dear Adalberto Albarado DO    Jose D Bailey was in my office today. Below is a copy of my note.    If you have questions, please do not hesitate to call me. I look forward to following Jose D along with you.         Sincerely,        BRENDON Abrams        CC: MD Marisa Chong APRN Terrence R Grimm, MD    Subjective:     Patient ID: Jose D Bailey is a 68 y.o. male.    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 Long term, we have been prescribing him gabapentin 600 mg twice per day for management of his symptoms. He has had some syncopal events. He has had right facial spasms long term and has been taking baclofen 10 mg twice a day as needed.     He had some concerns of an additional transient spell of numbness and heaviness on the right side with gait difficulties at his last visit, and we went ahead and ordered a CTA of the head and neck for further evaluation. He underwent CTA of the head and neck on 07/14/2023 with [facility]. There was note of mild plaque buildup in the left carotid bulb, but no hemodynamically significant stenosis. No intracranial stenosis, aneurysms, or occlusions. There is some left hilar adenopathy incompletely included within the field of view and recommended dedicated CT scan of the chest. Primary care provider has ordered additional imaging and addressed this with him.    On 06/19/2023 to 06/21/2023, he was admitted to [facility] for ureteral  stone found. He did pass out, and this was suspected to be related to dehydration and orthostatic hypotension. They provided him with i.v. fluids. He has continued to follow with all specialists. He is here for follow-up and refills on medications today. He signed his updated controlled substance agreement at last visit.    Today, he reports he is doing well. He has lost more weight. Dr. Albarado told him his medications are likely curbing his appetite. He eats constantly.    He confirms his primary care provider will repeat the CT scan of his chest in 6 months and the first one looked clear.    He reports when he had the renal calculi, he had no pain. He had some discomfort that day and thought he had slept wrong. He did not have any urinary symptoms. He spent that day in the sun watching ball games, and he did not drink enough fluids, so he became dehydrated. That night, he took his medications before bed, and he decided to take ibuprofen to see if this would help him sleep better because it provided relief earlier in the day. When he reached up to get the ibuprofen, he had a syncopal event. His wife called 911. He notes his blood pressure and blood glucose were normal at the time. When he got to the hospital, he was diagnosed with nephrolithiasis and underwent surgery.    He believes the gabapentin is still working well. He has not been without the gabapentin. He notes he has gotten used to the discomfort, but he rates his pain as a 1.5 to 2 out of 10 with the gabapentin. He states it feels like ants are crawling under his skin, but it is not to the point where he feels he cannot tolerate it.     The spasms in his right face has improved since he has been taking the baclofen at night. He is able to rest well at night.    He denies any new concerns today. He denies any other falls besides his syncopal episode in 06/2023.    Prior workup:  Followed long term for diabetic peripheral neuropathy, along with central pain  syndrome right side secondary to late effects of cerebrovascular accident in 2016.     He had EMG/NCVS 6/7/18 completed on all 4 extremities showing right median neuropathy at wrist but was otherwise normal. 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  He has had no recurrent stroke symptoms. History of left subthalamic lacunar stroke in November 2016 with normal MRI Brain. On aspirin and Crestor for 2nd stroke prevention.       Has had chronic right-sided paresthesias with central pain syndrome following suspected left subthalamic lacunar stroke from 11/2016. He has continued to have alteration in sensation as far as temperatures and textures on the right side since his stroke especially in regards to softer objects. Hard objects are easier for him to hold. He does have some issues with fine motor & manual dexterity.     He states he sustained a fall after he slipped on a piece of ice on 01/08/2022 and fractured his right ankle in 3 places. He had to undergo surgery for the same by Dr. Aguilera and he states he has 9 screws and a plate in his right ankle.       Follows with [facility] for Left eye diabetic retinopathy and has injections every 4 weeks.     He does report some continued right hand pain and numbness and tingling in first through third fingers.       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      Dr. Ady Norwood nephrology.     The patient states he had 21 teeth removed in 12/2021 secondary to diabetic issues.      Now has dentures.     He notes since his stroke in 2016, he is unable to lay on his right side. He states he will experience rhinorrhea on his right side without realizing, as he is unable to feel this side of his face since his stroke.      MRI of the brain was with and without contrast. This was completed on 12/07/2022. This did show stable chronic  and age-related changes present. No acute intracranial abnormalities. No abnormal contrast enhancement present. Chronic appearing bilateral lacunar infarcts seen within the left basal ganglia and similar to prior imaging from 2016.      MRI of the thoracic spine without contrast on 12/07/2022 did show some mildly exaggerated thoracic kyphosis. No acute abnormalities. Minimal spondylolysis present. No lesions or other abnormalities present. This is per radiology.      He confirms he had a presyncopal episode in 11/2022 on his first day at [employer]. He was with his wife, children, and grandchildren. He states his blood pressure was a little on the low side, and he was slightly dehydrated.     The following portions of the patient's history were reviewed and updated as appropriate: allergies, current medications, past family history, past medical history, past social history, past surgical history, and problem list.    Past Medical History:   Diagnosis Date   • Acute sinusitis    • Acute upper respiratory infection    • Cataract     Cataract extraction (OD, 2009;  OS, 2011).    • Chest pain    • Decreased ROM of left shoulder    • Diabetes mellitus    • Diabetic retinopathy    • Diabetic retinopathy associated with controlled type 2 diabetes mellitus 2020    Dr. Salinas giving shots in eyes    • Dyslipidemia     Remained on statin   • ED (erectile dysfunction)    • H/O cardiovascular stress test     2012   • Herpes zoster    • Hyperlipidemia    • Hypertension    • Insulin dependent type 2 diabetes mellitus     Type 2 insulin dependent diabetes (insulin dependent since 2009);  diagnosed approximately 1998.    • Junctional nevus of right forearm    • Nevus, atypical     Right arm   • Overweight     Mild overweight status, BMI 26.7.     • Plantar fasciitis     Bilateral plantar fasciitis;  utilizing orthotics.    • Quadriceps muscle strain    • Vasovagal syncope        Past Surgical History:   Procedure Laterality  Date   • ANKLE SURGERY Right 01/09/2022    @ [facility]   • CATARACT EXTRACTION      OS   • COLONOSCOPY      10/11/2010   • CYSTOSCOPY W/ URETERAL STENT PLACEMENT Left 6/20/2023    Procedure: CYSTOSCOPY LEFT URETEROSCOPY LASER LITHOTRIPSY,  URETERAL STENT INSERTION;  Surgeon: Montana Bajwa MD;  Location: [facility];  Service: Urology;  Laterality: Left;       Social History     Socioeconomic History   • Marital status:    Tobacco Use   • Smoking status: Never     Passive exposure: Never   • Smokeless tobacco: Never   Vaping Use   • Vaping Use: Never used   Substance and Sexual Activity   • Alcohol use: No   • Drug use: No   • Sexual activity: Defer     Partners: Female     Comment:        Family History   Problem Relation Age of Onset   • Stroke Mother    • Breast cancer Mother    • Cancer Father         lung    • Stroke Father    • Heart attack Father    • Other Father         CABG x6   • Coronary artery disease Father    • Diabetes Father    • Lung cancer Father    • Coronary artery disease Maternal Grandmother           Current Outpatient Medications:   •  aspirin 81 MG tablet, Take 1 tablet by mouth Daily., Disp: , Rfl:   •  baclofen (LIORESAL) 10 MG tablet, Take 1-2 tablets by mouth 2 (Two) Times a Day As Needed for Muscle Spasms., Disp: 180 tablet, Rfl: 3  •  bisoprolol (ZEBeta) 5 MG tablet, Take 0.5 tablets by mouth Daily., Disp: 45 tablet, Rfl: 3  •  Dulaglutide 0.75 MG/0.5ML solution pen-injector, Inject 0.75 mg under the skin into the appropriate area as directed 1 (One) Time Per Week., Disp: 2 mL, Rfl: 2  •  empagliflozin (Jardiance) 25 MG tablet tablet, Take 1 tablet by mouth Daily., Disp: 90 tablet, Rfl: 3  •  gabapentin (NEURONTIN) 600 MG tablet, Take 1 tablet by mouth 2 (Two) Times a Day., Disp: 180 tablet, Rfl: 1  •  glucose blood test strip, Check glucose twice a day., Disp: 50 each, Rfl: 7  •  glucose monitor monitoring kit, Check glucose twice a day., Disp: 1 each, Rfl:  "0  •  lisinopril (PRINIVIL,ZESTRIL) 2.5 MG tablet, Take 1 tablet by mouth Daily., Disp: 90 tablet, Rfl: 3  •  metFORMIN (Glucophage) 1000 MG tablet, Take 1 tablet by mouth 2 (Two) Times a Day With Meals., Disp: 180 tablet, Rfl: 1  •  rosuvastatin (CRESTOR) 40 MG tablet, Take 1 tablet by mouth Daily., Disp: 90 tablet, Rfl: 3     Review of Systems   Neurological:  Positive for syncope (once 6/2023, none since then) and numbness. Negative for headaches.   All other systems reviewed and are negative.       Objective:  /60   Pulse 81   Ht 167.6 cm (66\")   Wt 53.5 kg (118 lb)   SpO2 98%   BMI 19.05 kg/m²     Neurologic Exam  Mental Status   Oriented to person, place, and time.   Speech: speech is normal   Level of consciousness: alert     Cranial Nerves      CN II   Visual fields full to confrontation.      CN III, IV, VI   Pupils are equal, round, and reactive to light.  Extraocular motions are normal.      CN V   Right facial sensation deficit: decreased sensation right face chronic.  Left facial sensation deficit: none     CN VII   Right facial weakness: central. No hemifacial spasms today noted.  Left facial weakness: none     CN VIII   CN VIII normal.      CN IX, X   CN IX normal.   CN X normal.      CN XI   CN XI normal.      CN XII   CN XII normal.     Motor Exam   Muscle bulk: normal  Overall muscle tone: normal     Strength   Strength 5/5 throughout.     Sensory Exam     Chronic decreased sensation in right face, right arm, right flank, and right lower extremity, which is stable & unchanged.        Gait, Coordination, and Reflexes      Gait  Gait: (minimal antalgia, no ataxia)     Coordination   Romberg: negative  Finger to nose coordination: normal  Heel to shin coordination: normal  Tandem walking coordination: normal     Tremor   Resting tremor: absent  Intention tremor: absent  Action tremor: absent     Reflexes   Right : 2+  Left : 2+     Physical Exam  Constitutional:       Appearance: " Normal appearance. Weight 118lbs-has remained stable over 6 months.  Eyes:      Extraocular Movements: EOM normal.      Pupils: Pupils are equal, round, and reactive to light.   Neurological:      Mental Status: He is alert and oriented to person, place, and time.      Motor: Motor strength is normal.      Coordination: Finger-Nose-Finger Test, Heel to Shin Test and Romberg Test normal.      Gait: Tandem walk normal.   Psychiatric:         Mood and Affect: Mood and affect normal.         Speech: Speech normal.         Behavior: Behavior normal.         Thought Content: Thought content normal.         Cognition and Memory: Cognition and memory normal.         Judgment: Judgment normal.     Assessment/Plan:       Diagnoses and all orders for this visit:    1. Diabetic peripheral neuropathy (Primary)  -     gabapentin (NEURONTIN) 600 MG tablet; Take 1 tablet by mouth 2 (Two) Times a Day.  Dispense: 180 tablet; Refill: 1    2. Central pain syndrome  -     gabapentin (NEURONTIN) 600 MG tablet; Take 1 tablet by mouth 2 (Two) Times a Day.  Dispense: 180 tablet; Refill: 1    3. Late effects of CVA (cerebrovascular accident)  Comments:  continue aspirin and rosuvastatin    4. Facial spasm  Comments:  Right. minimal. Baclofen continue.    He will continue current medications. We reviewed the CTA of the head and neck. He will continue with all specialists. Follow up with PCP about repeat CT of the chest. He is maintaining his weight, which is good. He has no additional questions or concerns. He will follow up next available in 6 to 7 months in clinic or sooner if needed.       Reviewed medications, potential side effects and signs and symptoms to report. Discussed risk versus benefits of treatment plan with patient and/or family-including medications, labs and radiology that may be ordered. Addressed questions and concerns during visit. Patient and/or family verbalized understanding and agree with plan.    As part of this  patient's treatment plan I am prescribing controlled substances. The patient has been made aware of appropriate use of such medications, including potential risk of somnolence, limited ability to drive and/or work safely, and potential for dependence or overdose. It has also been made clear that these medications are for use by the patient only, without concomitant use of alcohol or other substances unless prescribed. Keep out of reach of children.  Shoaib report has been reviewed. If this is going to be prescribed long term, [facility] Controlled Substance Agreement Contract has also been read and signed by patient and myself.    During this visit the following were done:  Labs Reviewed [x]    Labs Ordered []    Radiology Reports Reviewed [x]    Radiology Ordered []    PCP Records Reviewed [x]    Referring Provider Records Reviewed []    ER Records Reviewed [x]    Hospital Records Reviewed [x]    History Obtained From Family []    Radiology Images Reviewed [x]    Other Reviewed [x]    Records Requested []      Transcribed from ambient dictation for BRENDON Abrams by Charisma Covington.  11/30/23   13:37 EST    Patient or patient representative verbalized consent to the visit recording.  I have personally performed the services described in this document as transcribed by the above individual, and it is both accurate and complete.  BRENDON Abrams  11/30/2023  16:40 EST      Note to patient: The 21st Century Cures Act makes medical notes like these available to patients in the interest of transparency. However, be advised this is a medical document. It is intended as peer to peer communication. It is written in medical language and may contain abbreviations or verbiage that are unfamiliar. It may appear blunt or direct. Medical documents are intended to carry relevant information, facts as evident, and the clinical opinion of the provider.

## 2023-11-30 NOTE — PROGRESS NOTES
Subjective:     Patient ID: Jose D Bailey is a 68 y.o. male.    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 Long term, we have been prescribing him gabapentin 600 mg twice per day for management of his symptoms. He has had some syncopal events. He has had right facial spasms long term and has been taking baclofen 10 mg twice a day as needed.     He had some concerns of an additional transient spell of numbness and heaviness on the right side with gait difficulties at his last visit, and we went ahead and ordered a CTA of the head and neck for further evaluation. He underwent CTA of the head and neck on 07/14/2023 with [facility]. There was note of mild plaque buildup in the left carotid bulb, but no hemodynamically significant stenosis. No intracranial stenosis, aneurysms, or occlusions. There is some left hilar adenopathy incompletely included within the field of view and recommended dedicated CT scan of the chest. Primary care provider has ordered additional imaging and addressed this with him.    On 06/19/2023 to 06/21/2023, he was admitted to [facility] for ureteral stone found. He did pass out, and this was suspected to be related to dehydration and orthostatic hypotension. They provided him with i.v. fluids. He has continued to follow with all specialists. He is here for follow-up and refills on medications today. He signed his updated controlled substance agreement at last visit.    Today, he reports he is doing well. He has lost more weight. Dr. Albarado told him his medications are likely curbing his appetite. He eats constantly.    He confirms his primary  care provider will repeat the CT scan of his chest in 6 months and the first one looked clear.    He reports when he had the renal calculi, he had no pain. He had some discomfort that day and thought he had slept wrong. He did not have any urinary symptoms. He spent that day in the sun watching ball games, and he did not drink enough fluids, so he became dehydrated. That night, he took his medications before bed, and he decided to take ibuprofen to see if this would help him sleep better because it provided relief earlier in the day. When he reached up to get the ibuprofen, he had a syncopal event. His wife called 911. He notes his blood pressure and blood glucose were normal at the time. When he got to the hospital, he was diagnosed with nephrolithiasis and underwent surgery.    He believes the gabapentin is still working well. He has not been without the gabapentin. He notes he has gotten used to the discomfort, but he rates his pain as a 1.5 to 2 out of 10 with the gabapentin. He states it feels like ants are crawling under his skin, but it is not to the point where he feels he cannot tolerate it.     The spasms in his right face has improved since he has been taking the baclofen at night. He is able to rest well at night.    He denies any new concerns today. He denies any other falls besides his syncopal episode in 06/2023.    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     He had EMG/NCVS 6/7/18 completed on all 4 extremities showing right median neuropathy at wrist but was otherwise normal. 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  He has had no recurrent stroke symptoms. History of left subthalamic lacunar stroke in November 2016 with normal MRI Brain. On aspirin and Crestor for 2nd stroke prevention.        Has had chronic right-sided paresthesias with central pain syndrome following suspected left subthalamic lacunar stroke from 11/2016. He has continued to have alteration in sensation as far as temperatures and textures on the right side since his stroke especially in regards to softer objects. Hard objects are easier for him to hold. He does have some issues with fine motor & manual dexterity.     He states he sustained a fall after he slipped on a piece of ice on 01/08/2022 and fractured his right ankle in 3 places. He had to undergo surgery for the same by Dr. Aguilera and he states he has 9 screws and a plate in his right ankle.       Follows with [facility] for Left eye diabetic retinopathy and has injections every 4 weeks.     He does report some continued right hand pain and numbness and tingling in first through third fingers.       He does continue to work on [employer] with an [employer] firm in [place], [place].      Dr. Ady Norwood nephrology.     The patient states he had 21 teeth removed in 12/2021 secondary to diabetic issues.      Now has dentures.     He notes since his stroke in 2016, he is unable to lay on his right side. He states he will experience rhinorrhea on his right side without realizing, as he is unable to feel this side of his face since his stroke.      MRI of the brain was with and without contrast. This was completed on 12/07/2022. This did show stable chronic and age-related changes present. No acute intracranial abnormalities. No abnormal contrast enhancement present. Chronic appearing bilateral lacunar infarcts seen within the left basal ganglia and similar to prior imaging from 2016.      MRI of the thoracic spine without contrast on 12/07/2022 did show some mildly exaggerated thoracic kyphosis. No acute abnormalities. Minimal spondylolysis present. No lesions or other abnormalities present. This is per radiology.      He confirms he had a  presyncopal episode in 11/2022 on his first day at [employer]. He was with his wife, children, and grandchildren. He states his blood pressure was a little on the low side, and he was slightly dehydrated.     The following portions of the patient's history were reviewed and updated as appropriate: allergies, current medications, past family history, past medical history, past social history, past surgical history, and problem list.    Past Medical History:   Diagnosis Date    Acute sinusitis     Acute upper respiratory infection     Cataract     Cataract extraction (OD, 2009;  OS, 2011).     Chest pain     Decreased ROM of left shoulder     Diabetes mellitus     Diabetic retinopathy     Diabetic retinopathy associated with controlled type 2 diabetes mellitus 2020    Dr. Salinas giving shots in eyes     Dyslipidemia     Remained on statin    ED (erectile dysfunction)     H/O cardiovascular stress test     2012    Herpes zoster     Hyperlipidemia     Hypertension     Insulin dependent type 2 diabetes mellitus     Type 2 insulin dependent diabetes (insulin dependent since 2009);  diagnosed approximately 1998.     Junctional nevus of right forearm     Nevus, atypical     Right arm    Overweight     Mild overweight status, BMI 26.7.      Plantar fasciitis     Bilateral plantar fasciitis;  utilizing orthotics.     Quadriceps muscle strain     Vasovagal syncope        Past Surgical History:   Procedure Laterality Date    ANKLE SURGERY Right 01/09/2022    @ [facility]    CATARACT EXTRACTION      OS    COLONOSCOPY      10/11/2010    CYSTOSCOPY W/ URETERAL STENT PLACEMENT Left 6/20/2023    Procedure: CYSTOSCOPY LEFT URETEROSCOPY LASER LITHOTRIPSY,  URETERAL STENT INSERTION;  Surgeon: Montana Bajwa MD;  Location: [facility];  Service: Urology;  Laterality: Left;       Social History     Socioeconomic History    Marital status:    Tobacco Use    Smoking status: Never     Passive exposure: Never     Smokeless tobacco: Never   Vaping Use    Vaping Use: Never used   Substance and Sexual Activity    Alcohol use: No    Drug use: No    Sexual activity: Defer     Partners: Female     Comment:        Family History   Problem Relation Age of Onset    Stroke Mother     Breast cancer Mother     Cancer Father         lung     Stroke Father     Heart attack Father     Other Father         CABG x6    Coronary artery disease Father     Diabetes Father     Lung cancer Father     Coronary artery disease Maternal Grandmother           Current Outpatient Medications:     aspirin 81 MG tablet, Take 1 tablet by mouth Daily., Disp: , Rfl:     baclofen (LIORESAL) 10 MG tablet, Take 1-2 tablets by mouth 2 (Two) Times a Day As Needed for Muscle Spasms., Disp: 180 tablet, Rfl: 3    bisoprolol (ZEBeta) 5 MG tablet, Take 0.5 tablets by mouth Daily., Disp: 45 tablet, Rfl: 3    Dulaglutide 0.75 MG/0.5ML solution pen-injector, Inject 0.75 mg under the skin into the appropriate area as directed 1 (One) Time Per Week., Disp: 2 mL, Rfl: 2    empagliflozin (Jardiance) 25 MG tablet tablet, Take 1 tablet by mouth Daily., Disp: 90 tablet, Rfl: 3    gabapentin (NEURONTIN) 600 MG tablet, Take 1 tablet by mouth 2 (Two) Times a Day., Disp: 180 tablet, Rfl: 1    glucose blood test strip, Check glucose twice a day., Disp: 50 each, Rfl: 7    glucose monitor monitoring kit, Check glucose twice a day., Disp: 1 each, Rfl: 0    lisinopril (PRINIVIL,ZESTRIL) 2.5 MG tablet, Take 1 tablet by mouth Daily., Disp: 90 tablet, Rfl: 3    metFORMIN (Glucophage) 1000 MG tablet, Take 1 tablet by mouth 2 (Two) Times a Day With Meals., Disp: 180 tablet, Rfl: 1    rosuvastatin (CRESTOR) 40 MG tablet, Take 1 tablet by mouth Daily., Disp: 90 tablet, Rfl: 3     Review of Systems   Neurological:  Positive for syncope (once 6/2023, none since then) and numbness. Negative for headaches.   All other systems reviewed and are negative.       Objective:  /60   Pulse 81  "  Ht 167.6 cm (66\")   Wt 53.5 kg (118 lb)   SpO2 98%   BMI 19.05 kg/m²     Neurologic Exam  Mental Status   Oriented to person, place, and time.   Speech: speech is normal   Level of consciousness: alert     Cranial Nerves      CN II   Visual fields full to confrontation.      CN III, IV, VI   Pupils are equal, round, and reactive to light.  Extraocular motions are normal.      CN V   Right facial sensation deficit: decreased sensation right face chronic.  Left facial sensation deficit: none     CN VII   Right facial weakness: central. No hemifacial spasms today noted.  Left facial weakness: none     CN VIII   CN VIII normal.      CN IX, X   CN IX normal.   CN X normal.      CN XI   CN XI normal.      CN XII   CN XII normal.     Motor Exam   Muscle bulk: normal  Overall muscle tone: normal     Strength   Strength 5/5 throughout.     Sensory Exam     Chronic decreased sensation in right face, right arm, right flank, and right lower extremity, which is stable & unchanged.        Gait, Coordination, and Reflexes      Gait  Gait: (minimal antalgia, no ataxia)     Coordination   Romberg: negative  Finger to nose coordination: normal  Heel to shin coordination: normal  Tandem walking coordination: normal     Tremor   Resting tremor: absent  Intention tremor: absent  Action tremor: absent     Reflexes   Right : 2+  Left : 2+     Physical Exam  Constitutional:       Appearance: Normal appearance. Weight 118lbs-has remained stable over 6 months.  Eyes:      Extraocular Movements: EOM normal.      Pupils: Pupils are equal, round, and reactive to light.   Neurological:      Mental Status: He is alert and oriented to person, place, and time.      Motor: Motor strength is normal.      Coordination: Finger-Nose-Finger Test, Heel to Shin Test and Romberg Test normal.      Gait: Tandem walk normal.   Psychiatric:         Mood and Affect: Mood and affect normal.         Speech: Speech normal.         Behavior: Behavior " normal.         Thought Content: Thought content normal.         Cognition and Memory: Cognition and memory normal.         Judgment: Judgment normal.     Assessment/Plan:       Diagnoses and all orders for this visit:    1. Diabetic peripheral neuropathy (Primary)  -     gabapentin (NEURONTIN) 600 MG tablet; Take 1 tablet by mouth 2 (Two) Times a Day.  Dispense: 180 tablet; Refill: 1    2. Central pain syndrome  -     gabapentin (NEURONTIN) 600 MG tablet; Take 1 tablet by mouth 2 (Two) Times a Day.  Dispense: 180 tablet; Refill: 1    3. Late effects of CVA (cerebrovascular accident)  Comments:  continue aspirin and rosuvastatin    4. Facial spasm  Comments:  Right. minimal. Baclofen continue.    He will continue current medications. We reviewed the CTA of the head and neck. He will continue with all specialists. Follow up with PCP about repeat CT of the chest. He is maintaining his weight, which is good. He has no additional questions or concerns. He will follow up next available in 6 to 7 months in clinic or sooner if needed.       Reviewed medications, potential side effects and signs and symptoms to report. Discussed risk versus benefits of treatment plan with patient and/or family-including medications, labs and radiology that may be ordered. Addressed questions and concerns during visit. Patient and/or family verbalized understanding and agree with plan.    As part of this patient's treatment plan I am prescribing controlled substances. The patient has been made aware of appropriate use of such medications, including potential risk of somnolence, limited ability to drive and/or work safely, and potential for dependence or overdose. It has also been made clear that these medications are for use by the patient only, without concomitant use of alcohol or other substances unless prescribed. Keep out of reach of children.  Shoaib report has been reviewed. If this is going to be prescribed long term, [facility] [place] has also been read and signed by patient and myself.    During this visit the following were done:  Labs Reviewed [x]    Labs Ordered []    Radiology Reports Reviewed [x]    Radiology Ordered []    PCP Records Reviewed [x]    Referring Provider Records Reviewed []    ER Records Reviewed [x]    Hospital Records Reviewed [x]    History Obtained From Family []    Radiology Images Reviewed [x]    Other Reviewed [x]    Records Requested []      Transcribed from ambient dictation for BRENDON Abrams by Charisma Covington.  11/30/23   13:37 EST    Patient or patient representative verbalized consent to the visit recording.  I have personally performed the services described in this document as transcribed by the above individual, and it is both accurate and complete.  BRENDON Abrams  11/30/2023  16:40 EST      Note to patient: The 21st Century Cures Act makes medical notes like these available to patients in the interest of transparency. However, be advised this is a medical document. It is intended as peer to peer communication. It is written in medical language and may contain abbreviations or verbiage that are unfamiliar. It may appear blunt or direct. Medical documents are intended to carry relevant information, facts as evident, and the clinical opinion of the provider.

## 2023-12-06 ENCOUNTER — OFFICE VISIT (OUTPATIENT)
Dept: FAMILY MEDICINE CLINIC | Facility: CLINIC | Age: 68
End: 2023-12-06
Payer: COMMERCIAL

## 2023-12-06 VITALS
WEIGHT: 118 LBS | TEMPERATURE: 97.1 F | DIASTOLIC BLOOD PRESSURE: 64 MMHG | OXYGEN SATURATION: 97 % | BODY MASS INDEX: 18.96 KG/M2 | HEIGHT: 66 IN | SYSTOLIC BLOOD PRESSURE: 104 MMHG | HEART RATE: 64 BPM

## 2023-12-06 DIAGNOSIS — E11.9 TYPE 2 DIABETES MELLITUS WITHOUT COMPLICATION, WITH LONG-TERM CURRENT USE OF INSULIN: Primary | ICD-10-CM

## 2023-12-06 DIAGNOSIS — Z79.4 TYPE 2 DIABETES MELLITUS WITHOUT COMPLICATION, WITH LONG-TERM CURRENT USE OF INSULIN: Primary | ICD-10-CM

## 2023-12-06 LAB
EXPIRATION DATE: ABNORMAL
HBA1C MFR BLD: 7.7 % (ref 4.5–5.7)
Lab: ABNORMAL

## 2023-12-06 NOTE — PROGRESS NOTES
Follow Up Office Visit      Patient Name: Jose D Bailey  : 1955   MRN: [id number]     Chief Complaint:    Chief Complaint   Patient presents with    Diabetes       History of Present Illness: Jose D Bailey is a 68 y.o. male who is here today to follow up with diabetes.  Diabetes is relatively well controlled with A1c below 8.  Patient has lost a lot of weight over the past several months because of dentition issue.  Patient says his teeth are feeling better and he is trying to eat more.  Weight has stabilized.  Doing well on Trulicity, Jardiance and metformin otherwise.      Physical exam: Patient's heart exam RRR.  Lung exam CTA bilaterally      Subjective        I have reviewed and the following portions of the patient's history were updated as appropriate: past family history, past medical history, past social history, past surgical history and problem list.    Medications:     Current Outpatient Medications:     aspirin 81 MG tablet, Take 1 tablet by mouth Daily., Disp: , Rfl:     baclofen (LIORESAL) 10 MG tablet, Take 1-2 tablets by mouth 2 (Two) Times a Day As Needed for Muscle Spasms., Disp: 180 tablet, Rfl: 3    bisoprolol (ZEBeta) 5 MG tablet, Take 0.5 tablets by mouth Daily., Disp: 45 tablet, Rfl: 3    Dulaglutide 0.75 MG/0.5ML solution pen-injector, Inject 0.75 mg under the skin into the appropriate area as directed 1 (One) Time Per Week., Disp: 2 mL, Rfl: 2    empagliflozin (Jardiance) 25 MG tablet tablet, Take 1 tablet by mouth Daily., Disp: 90 tablet, Rfl: 3    gabapentin (NEURONTIN) 600 MG tablet, Take 1 tablet by mouth 2 (Two) Times a Day., Disp: 180 tablet, Rfl: 1    glucose blood test strip, Check glucose twice a day., Disp: 50 each, Rfl: 7    glucose monitor monitoring kit, Check glucose twice a day., Disp: 1 each, Rfl: 0    lisinopril (PRINIVIL,ZESTRIL) 2.5 MG tablet, Take 1 tablet by mouth Daily., Disp: 90 tablet, Rfl: 3    metFORMIN (Glucophage) 1000 MG tablet, Take 1 tablet by mouth  "2 (Two) Times a Day With Meals., Disp: 180 tablet, Rfl: 1    rosuvastatin (CRESTOR) 40 MG tablet, Take 1 tablet by mouth Daily., Disp: 90 tablet, Rfl: 3    Allergies:   Allergies   Allergen Reactions    Sulfa Antibiotics Hives    Atorvastatin Myalgia    Pravachol [Pravastatin Sodium] Myalgia    Pravastatin Myalgia    Simvastatin Myalgia    Pseudoephedrine Rash       Objective     Physical Exam: Please see above  Vital Signs:   Vitals:    12/06/23 0913   BP: 104/64   Pulse: 64   Temp: 97.1 °F (36.2 °C)   SpO2: 97%   Weight: 53.5 kg (118 lb)   Height: 167.6 cm (66\")     Body mass index is 19.05 kg/m².          Assessment / Plan      Assessment/Plan:   Diagnoses and all orders for this visit:    1. Type 2 diabetes mellitus without complication, with long-term current use of insulin (Primary)  -     POC Glycosylated Hemoglobin (Hb A1C)    Diabetes controlled but slightly worsened compared to previous A1c.  A1c below 8.  Patient's goal is below 8 given age and comorbidities.  Recommend further diet control diabetes.  Continue current medication without change.    Less concern for weight loss initially, but this has stabilized.  If patient's diabetes continues to worsen will consider work-up regarding pancreas and may refer patient to endocrinology.    Follow Up:   Return in about 3 months (around 3/6/2024) for Recheck.    Adalberto Albarado DO  [facility]   "

## 2024-01-22 ENCOUNTER — TELEPHONE (OUTPATIENT)
Dept: FAMILY MEDICINE CLINIC | Facility: CLINIC | Age: 69
End: 2024-01-22

## 2024-01-22 NOTE — TELEPHONE ENCOUNTER
Caller: Jose D Bailey    Relationship: Self    Best call back number: [phone]     What test/procedure requested: PRIOR AUTHORIZATION FOR TRULICITY MEDICATION     When is it needed: ASAP    Where is the test/procedure going to be performed: NA    Additional information or concerns: PATIENT CALLED AND STATES HIS INSURANCE NEEDS A PRIOR AUTHORIZATION TO FILL MEDICATION.

## 2024-01-24 NOTE — TELEPHONE ENCOUNTER
"Started to attempt prior auth and got the following message, do you want to proceed?  \"Trulicity 0.75MG/0.5ML pen-injectors Required  Farxiga Not Required  Januvia Not Required  Jardiance Not Required  Lantus Solostar Not Required  Metformin Not Required  Basaglar KwikPen Required  Bydureon BCise Required  Bydureon vial Required  Byetta Required  Mounjaro Required  Ozempic Required  Rybelsus Required  Victoza Required\"  "

## 2024-01-25 NOTE — TELEPHONE ENCOUNTER
"They are asking this question for prior auth- \"Does the patient have multiple endocrine neoplasia syndrome type 2 (MEN 2) or a personal or family history of medullary thyroid carcinoma (MTC)?*\" I dont see this but wanted to double check with you.  "

## 2024-01-31 DIAGNOSIS — Z79.4 TYPE 2 DIABETES MELLITUS WITHOUT COMPLICATION, WITH LONG-TERM CURRENT USE OF INSULIN: Primary | ICD-10-CM

## 2024-01-31 DIAGNOSIS — E11.9 TYPE 2 DIABETES MELLITUS WITHOUT COMPLICATION, WITH LONG-TERM CURRENT USE OF INSULIN: Primary | ICD-10-CM

## 2024-01-31 RX ORDER — GLIPIZIDE 2.5 MG/1
2.5 TABLET, EXTENDED RELEASE ORAL DAILY
Qty: 90 TABLET | Refills: 0 | Status: SHIPPED | OUTPATIENT
Start: 2024-01-31

## 2024-01-31 NOTE — TELEPHONE ENCOUNTER
Caller: Jose D Bailey    Relationship: Self    Best call back number: [phone]    What is the best time to reach you: SELF    Who are you requesting to speak with (clinical staff, provider,  specific staff member): CLINICAL    Do you know the name of the person who called: PATIENT    What was the call regarding: PLEASE CALL PATIENT WITH ANY UPDATE ON THIS PRIOR AUTHORIZATION. HE STATES HE NEEDS THE MEDICATION FOR DOSAGE THIS WEEKEND.    Is it okay if the provider responds through MyChart: NO

## 2024-01-31 NOTE — TELEPHONE ENCOUNTER
PER PATIENT HE CALLED INS AND THEY SAID  CAN DO A PEER TO PEER. PLEASE CALL AND DO THAT SO HE CAN HAVE HIS MEDS THIS WEEKEND

## 2024-02-01 NOTE — TELEPHONE ENCOUNTER
I spoke with the wife and she doesn't think that he has been on this medication and is going to check with the patient to see if he has ever been on it and then discuss this further with Dr. Albarado at the next appt.

## 2024-02-02 ENCOUNTER — TELEPHONE (OUTPATIENT)
Dept: FAMILY MEDICINE CLINIC | Facility: CLINIC | Age: 69
End: 2024-02-02
Payer: COMMERCIAL

## 2024-02-02 NOTE — TELEPHONE ENCOUNTER
PATIENT GOT GLIPIZIDE CALLED BECAUSE THE [facility] PA WAS DENIED, NOW THE INSURANCE WANTS A PA ON THE GLIPIZIDE, THE INSURANCE IS TELLING THE PATIENT THAT WE ARE NOT SUBMITTING THE A1C RESULTS AND THAT IS THE ONLY THING HOLDING UP THE APPROVALS HE HAS TO HAVE A1C OVER 6.5 AND HIS WAS 7.7   PLEASE LET PATIENT KNOW, HE IS OUT COMPLETELY HE NEEDS SOME SAMPLES OR SOMETHING TO GET HIM THRU UNTIL HIS INSURANCE WILL APPROVE ONE OF THESE MEDS FOR HIM.

## 2024-02-03 NOTE — TELEPHONE ENCOUNTER
Spoke with pt and informed him that he can get his glipizide with good RX. He acknowledged understanding.

## 2024-02-22 ENCOUNTER — PRIOR AUTHORIZATION (OUTPATIENT)
Dept: NEUROLOGY | Facility: CLINIC | Age: 69
End: 2024-02-22
Payer: COMMERCIAL

## 2024-03-06 ENCOUNTER — OFFICE VISIT (OUTPATIENT)
Dept: FAMILY MEDICINE CLINIC | Facility: CLINIC | Age: 69
End: 2024-03-06
Payer: COMMERCIAL

## 2024-03-06 ENCOUNTER — LAB (OUTPATIENT)
Dept: LAB | Facility: HOSPITAL | Age: 69
End: 2024-03-06
Payer: COMMERCIAL

## 2024-03-06 VITALS
TEMPERATURE: 98 F | BODY MASS INDEX: 18.96 KG/M2 | DIASTOLIC BLOOD PRESSURE: 64 MMHG | HEIGHT: 66 IN | SYSTOLIC BLOOD PRESSURE: 118 MMHG | WEIGHT: 118 LBS

## 2024-03-06 DIAGNOSIS — I10 ESSENTIAL HYPERTENSION: ICD-10-CM

## 2024-03-06 DIAGNOSIS — Z12.5 PROSTATE CANCER SCREENING: ICD-10-CM

## 2024-03-06 DIAGNOSIS — E11.65 TYPE 2 DIABETES MELLITUS WITH HYPERGLYCEMIA, WITHOUT LONG-TERM CURRENT USE OF INSULIN: Primary | ICD-10-CM

## 2024-03-06 DIAGNOSIS — E78.00 PURE HYPERCHOLESTEROLEMIA: ICD-10-CM

## 2024-03-06 DIAGNOSIS — E11.65 TYPE 2 DIABETES MELLITUS WITH HYPERGLYCEMIA, WITHOUT LONG-TERM CURRENT USE OF INSULIN: ICD-10-CM

## 2024-03-06 LAB
ALBUMIN SERPL-MCNC: 4.4 G/DL (ref 3.5–5.2)
ALBUMIN/GLOB SERPL: 1.9 G/DL
ALP SERPL-CCNC: 106 U/L (ref 39–117)
ALT SERPL W P-5'-P-CCNC: 9 U/L (ref 1–41)
ANION GAP SERPL CALCULATED.3IONS-SCNC: 14.6 MMOL/L (ref 5–15)
AST SERPL-CCNC: 16 U/L (ref 1–40)
BILIRUB SERPL-MCNC: 0.4 MG/DL (ref 0–1.2)
BUN SERPL-MCNC: 24 MG/DL (ref 8–23)
BUN/CREAT SERPL: 19 (ref 7–25)
CALCIUM SPEC-SCNC: 9.8 MG/DL (ref 8.6–10.5)
CHLORIDE SERPL-SCNC: 107 MMOL/L (ref 98–107)
CHOLEST SERPL-MCNC: 102 MG/DL (ref 0–200)
CO2 SERPL-SCNC: 22.4 MMOL/L (ref 22–29)
CREAT SERPL-MCNC: 1.26 MG/DL (ref 0.76–1.27)
EGFRCR SERPLBLD CKD-EPI 2021: 61.4 ML/MIN/1.73
EXPIRATION DATE: ABNORMAL
GLOBULIN UR ELPH-MCNC: 2.3 GM/DL
GLUCOSE SERPL-MCNC: 114 MG/DL (ref 65–99)
HBA1C MFR BLD: 6.7 % (ref 4.5–5.7)
HDLC SERPL-MCNC: 51 MG/DL (ref 40–60)
LDLC SERPL CALC-MCNC: 38 MG/DL (ref 0–100)
LDLC/HDLC SERPL: 0.8 {RATIO}
Lab: ABNORMAL
POTASSIUM SERPL-SCNC: 4.7 MMOL/L (ref 3.5–5.2)
PROT SERPL-MCNC: 6.7 G/DL (ref 6–8.5)
PSA SERPL-MCNC: 1.32 NG/ML (ref 0–4)
SODIUM SERPL-SCNC: 144 MMOL/L (ref 136–145)
TRIGL SERPL-MCNC: 52 MG/DL (ref 0–150)
TSH SERPL DL<=0.05 MIU/L-ACNC: 1.04 UIU/ML (ref 0.27–4.2)
VLDLC SERPL-MCNC: 13 MG/DL (ref 5–40)

## 2024-03-06 PROCEDURE — 84443 ASSAY THYROID STIM HORMONE: CPT

## 2024-03-06 PROCEDURE — G0103 PSA SCREENING: HCPCS

## 2024-03-06 PROCEDURE — 80053 COMPREHEN METABOLIC PANEL: CPT

## 2024-03-06 PROCEDURE — 80061 LIPID PANEL: CPT

## 2024-03-06 RX ORDER — GLIPIZIDE 2.5 MG/1
2.5 TABLET, EXTENDED RELEASE ORAL DAILY
Qty: 90 TABLET | Refills: 1 | Status: CANCELLED | OUTPATIENT
Start: 2024-03-06

## 2024-03-06 RX ORDER — LISINOPRIL 2.5 MG/1
2.5 TABLET ORAL DAILY
Qty: 90 TABLET | Refills: 3 | Status: SHIPPED | OUTPATIENT
Start: 2024-03-06

## 2024-03-06 NOTE — PROGRESS NOTES
Follow Up Office Visit      Patient Name: Jose D Bailey  : 1955   MRN: [id number]     Chief Complaint:    Chief Complaint   Patient presents with    Diabetes       History of Present Illness: Jose D Bailey is a 69 y.o. male who is here today to follow up with Diabetes, hypertension, high cholesterol.  Patient sees a nephrologist, urology, and neurologist.  Patient is here today for follow-up regarding diabetes which has improved.  It is well-controlled currently.  Patient was unable to get Trulicity filled because of insurance issues.  Patient would like to switch back over to Trulicity from glipizide if possible.  He is doing well on glipizide, but he was previously on Trulicity doing well.    Hypertension well-controlled.  Cholesterol is doing well.  Patient needs prostate cancer screening      Weight has stabilized.  He has poor dentition due to oral surgery and things are improving.    Physical exam: Mood and affect appropriate.  Heart exam RRR.  Gait steady and slow.      Subjective        I have reviewed and the following portions of the patient's history were updated as appropriate: past family history, past medical history, past social history, past surgical history and problem list.    Medications:     Current Outpatient Medications:     aspirin 81 MG tablet, Take 1 tablet by mouth Daily., Disp: , Rfl:     baclofen (LIORESAL) 10 MG tablet, Take 1-2 tablets by mouth 2 (Two) Times a Day As Needed for Muscle Spasms., Disp: 180 tablet, Rfl: 3    bisoprolol (ZEBeta) 5 MG tablet, Take 0.5 tablets by mouth Daily., Disp: 45 tablet, Rfl: 3    empagliflozin (Jardiance) 25 MG tablet tablet, Take 1 tablet by mouth Daily., Disp: 90 tablet, Rfl: 1    gabapentin (NEURONTIN) 600 MG tablet, Take 1 tablet by mouth 2 (Two) Times a Day., Disp: 180 tablet, Rfl: 1    glucose blood test strip, Check glucose twice a day., Disp: 50 each, Rfl: 7    glucose monitor monitoring kit, Check glucose twice a day., Disp: 1 each,  "Rfl: 0    lisinopril (PRINIVIL,ZESTRIL) 2.5 MG tablet, Take 1 tablet by mouth Daily., Disp: 90 tablet, Rfl: 3    metFORMIN (Glucophage) 1000 MG tablet, Take 1 tablet by mouth 2 (Two) Times a Day With Meals., Disp: 180 tablet, Rfl: 1    rosuvastatin (CRESTOR) 40 MG tablet, Take 1 tablet by mouth Daily., Disp: 90 tablet, Rfl: 3    Dulaglutide 0.75 MG/0.5ML solution pen-injector, Inject 0.75 mg under the skin into the appropriate area as directed 1 (One) Time Per Week., Disp: 2 mL, Rfl: 2    Allergies:   Allergies   Allergen Reactions    Sulfa Antibiotics Hives    Atorvastatin Myalgia    Pravachol [Pravastatin Sodium] Myalgia    Pravastatin Myalgia    Simvastatin Myalgia    Pseudoephedrine Rash       Objective     Physical Exam: Please see above  Vital Signs:   Vitals:    03/06/24 0929   BP: 118/64   Temp: 98 °F (36.7 °C)   Weight: 53.5 kg (118 lb)   Height: 167.6 cm (66\")     Body mass index is 19.05 kg/m².          Assessment / Plan      Assessment/Plan:   Diagnoses and all orders for this visit:    1. Type 2 diabetes mellitus with hyperglycemia, without long-term current use of insulin (Primary)  -     POC Glycosylated Hemoglobin (Hb A1C)  -     Comprehensive Metabolic Panel; Future  -     metFORMIN (Glucophage) 1000 MG tablet; Take 1 tablet by mouth 2 (Two) Times a Day With Meals.  Dispense: 180 tablet; Refill: 1  -     empagliflozin (Jardiance) 25 MG tablet tablet; Take 1 tablet by mouth Daily.  Dispense: 90 tablet; Refill: 1  -     Dulaglutide 0.75 MG/0.5ML solution pen-injector; Inject 0.75 mg under the skin into the appropriate area as directed 1 (One) Time Per Week.  Dispense: 2 mL; Refill: 2    2. Essential hypertension  -     Comprehensive Metabolic Panel; Future  -     Cancel: CBC & Differential; Future  -     TSH Rfx On Abnormal To Free T4; Future  -     lisinopril (PRINIVIL,ZESTRIL) 2.5 MG tablet; Take 1 tablet by mouth Daily.  Dispense: 90 tablet; Refill: 3    3. Pure hypercholesterolemia  -     " Comprehensive Metabolic Panel; Future  -     Lipid Panel; Future    4. Prostate cancer screening  -     PSA Screen; Future    Prostate cancer screening discussed and ordered    Diabetes well-controlled-continue metformin 1000 mg twice daily and Jardiance 25 mg daily.  Stop glipizide and start Trulicity 0.75 mg.  Monitor weight and follow-up in 6 months.    Hypertension controlled and high cholesterol controlled.  Recheck labs as above.      Follow Up:   Return in 6 months (on 9/6/2024) for Labs today, Recheck diabetes, Annual.    Adalberto Albarado DO  [facility]

## 2024-03-14 ENCOUNTER — HOSPITAL ENCOUNTER (OUTPATIENT)
Dept: CT IMAGING | Facility: HOSPITAL | Age: 69
Discharge: HOME | End: 2024-03-14
Admitting: FAMILY MEDICINE
Payer: COMMERCIAL

## 2024-03-14 DIAGNOSIS — R91.1 LUNG NODULE: ICD-10-CM

## 2024-03-14 PROCEDURE — 71250 CT THORAX DX C-: CPT

## 2024-03-19 ENCOUNTER — TELEPHONE (OUTPATIENT)
Dept: FAMILY MEDICINE CLINIC | Facility: CLINIC | Age: 69
End: 2024-03-19
Payer: COMMERCIAL

## 2024-03-19 NOTE — TELEPHONE ENCOUNTER
PATIENT IS ON BLUECROSS AND BLUE SHIELD, NOT MEDICARE A.  THAT IS ONLY FOR HOSPITALIZATION.  [facility] IS SAYING ALL THEY NEED FOR THE PRIOR AUTH IS HIS A1C TO BE ABOVE 6.5 AND IT IS 7.7.  CAN SOMEONE DO THE PRIOR AUTH AGAIN USING HIS BLUECROSS AND BLUE SHIELD?      DR. NAYLOR TOOK THE LETTER THAT PATIENT RECEIVED AND SAID HE WAS GOING TO GIVE TO [facility].  THIS WAS ON HIS LAST VISIT OF 3/6 SO BARON OR DR. NAYLOR SHOULD HAVE THIS INFORMATION.    DR. NAYLOR ALSO SENT IN ANOTHER PRESCRIPTION FOR TRULICTY THAT DAY AND THEY ARE WAITING ON THE APPROVAL.

## 2024-04-10 ENCOUNTER — PRIOR AUTHORIZATION (OUTPATIENT)
Dept: FAMILY MEDICINE CLINIC | Facility: CLINIC | Age: 69
End: 2024-04-10
Payer: COMMERCIAL

## 2024-05-16 NOTE — PROGRESS NOTES
Subjective:     Encounter Date:05/22/2024      Patient ID: Jose D Bailey is a 69 y.o.  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       REFERRING PHYSICIAN: Clinton Day MD  CURRENT PHYSICIAN: Adalberto Albarado DO  NEUROLOGIST:  BRENDON Pagan   UROLOGIST: Dr. Morin, Montana Bajwa MD  GASTROENTEROLOGIST: Ruperto Barroso MD  NEPHROLOGIST: [facility]  ORTHOPEDIC SURGEON: Roly Aguilera MD.    Chief Complaint:   Chief Complaint   Patient presents with    Hypertensive heart disease without heart failure     Problem List:  Probable hypertensive cardiovascular disease:  Apparent remote screening 2D echocardiogram, data deficit ([facility]), approximately 2005.   Apparent unremarkable serial treadmill GXT/Cardiolite GXT, data deficit ([facility], serially every 3-4 years x14 years).   Abnormal screening treadmill GXT showing physical deconditioning (accelerated HR with 107% age-predicted MHR with 50% PEC) with post-exercise EKG changes in the absence of anginal type chest pain, as well as frequent PVCs/PACs during exercise without complex forms; without accelerated blood pressure ([facility]), 04/04/12.   Acceptable IV LEXIscan Cardiolite study, LVEF (0.68) with residual class I symptoms, June 2012.  Echocardiogram 6/27/2023: LVEF 60%, cardiac valves anatomically and functionally normal.  Residual class I symptoms, July 2020, July 2021, March 2022, September 2022, April 2023, November 2023, May 2024  Hypertension with remote suboptimal control, improved.  Dyslipidemia, maintained on statin.   Uncontrolled type 2 insulin dependent diabetes (insulin dependent since 2009); diagnosed approximately 1998, hemoglobin A1c 8.5% (November 2016); hemoglobin A1c 9.2% (September 2017); hemoglobin A1c 7.3% (September 2018); 7.7% (May 2019), 7.1% May 2020, 7.7% July 2021, 5.5%  June 2022, 6.3% September 2022, 5.6% March 2023, 7.4% September 2023, 6.7% March 2024.  Overweight, BMI 29.1  Bilateral plantar fasciitis; utilizing orthotics.   Cataract extraction (OD, 2009; OS, 2011).   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  Presyncope with OSH ED visit in [place] November 2022, patient left AMA, hypotensive and dehydrated  [facility] 2-day hospitalization June 2023 for syncope and collapse with left nephrolithiasis.  Patient is status post cystoscopy.  Syncope felt to be due to dehydration and orthostatic blood pressure.  Pulmonary nodules with stable CT chest August 2023    Allergies   Allergen Reactions    Sulfa Antibiotics Hives    Atorvastatin Myalgia    Pravachol [Pravastatin Sodium] Myalgia    Pravastatin Myalgia    Simvastatin Myalgia    Pseudoephedrine Rash       Current Outpatient Medications   Medication Instructions    aspirin 81 mg, Oral, Daily    baclofen (LIORESAL) 10-20 mg, Oral, 2 Times Daily PRN    bisoprolol (ZEBETA) 2.5 mg, Oral, Daily    Dulaglutide 0.75 mg, Subcutaneous, Weekly    empagliflozin (JARDIANCE) 25 mg, Oral, Daily    gabapentin (NEURONTIN) 600 mg, Oral, 2 Times Daily    lisinopril (PRINIVIL,ZESTRIL) 2.5 mg, Oral, Daily    metFORMIN (GLUCOPHAGE) 1,000 mg, Oral, 2 Times Daily With Meals    rosuvastatin (CRESTOR) 40 mg, Oral, Daily         HISTORY OF PRESENT ILLNESS:  The patient is here for 6-month follow-up.  The  "patient denies any chest pain, shortness of breath, palpitations, dizziness, presyncope, or syncope.  He does some walking for activity and is still working.  He has had some difficulties with eating with his dentures.  He has finally gotten used to this but says that he has had to adjust the way that he chews food so he does not have any jaw popping.  His blood pressures at home have been WNL.      ROS   All other systems reviewed and otherwise negative.    Procedures       Objective:       Vitals:    05/22/24 1450 05/22/24 1452   BP: 112/60 114/64   BP Location: Left arm Left arm   Patient Position: Sitting Standing   Cuff Size: Adult Adult   Pulse: 82    SpO2: 99%    Weight: 53.2 kg (117 lb 3.2 oz)    Height: 167.6 cm (66\")      Body mass index is 18.92 kg/m².  Wt Readings from Last 2 Encounters:   05/22/24 53.2 kg (117 lb 3.2 oz)   03/06/24 53.5 kg (118 lb)        Constitutional:       Appearance: Healthy appearance. Not in distress.   Neck:      Vascular: No JVR. JVD normal.   Pulmonary:      Effort: Pulmonary effort is normal.      Breath sounds: Normal breath sounds. No wheezing. No rhonchi. No rales.   Chest:      Chest wall: Not tender to palpatation.   Cardiovascular:      PMI at left midclavicular line. Normal rate. Regular rhythm. Normal S1. Normal S2.       Murmurs: There is a grade 1/6 systolic murmur at the URSB.      No gallop.  No click. No rub.   Pulses:     Intact distal pulses.   Edema:     Peripheral edema absent.   Abdominal:      General: Bowel sounds are normal.      Palpations: Abdomen is soft.      Tenderness: There is no abdominal tenderness.   Musculoskeletal: Normal range of motion.         General: No tenderness. Skin:     General: Skin is warm and dry.   Neurological:      General: No focal deficit present.      Mental Status: Alert and oriented to person, place and time.           Lab Review:   Lab Results   Component Value Date    GLUCOSE 114 (H) 03/06/2024    BUN 24 (H) 03/06/2024 "    CREATININE 1.26 03/06/2024    EGFRIFNONA 59 (L) 04/06/2021    BCR 19.0 03/06/2024    CO2 22.4 03/06/2024    CALCIUM 9.8 03/06/2024    ALBUMIN 4.4 03/06/2024    AST 16 03/06/2024    ALT 9 03/06/2024       Lab Results   Component Value Date    WBC 8.10 06/20/2023    HGB 13.0 06/20/2023    HCT 39.7 06/20/2023    MCV 93.9 06/20/2023     06/20/2023       Lab Results   Component Value Date    HGBA1C 6.7 (A) 03/06/2024       Lab Results   Component Value Date    TSH 1.040 03/06/2024       Lab Results   Component Value Date    CHOL 102 03/06/2024    CHOL 115 06/16/2022     Lab Results   Component Value Date    TRIG 52 03/06/2024    TRIG 83 06/16/2022     Lab Results   Component Value Date    HDL 51 03/06/2024    HDL 46 06/16/2022     Lab Results   Component Value Date    LDL 38 03/06/2024    LDL 53 06/16/2022     Results for orders placed during the hospital encounter of 06/19/23    Adult Transthoracic Echo Complete W/ Cont if Necessary Per Protocol    Interpretation Summary    Left ventricular systolic function is normal. Estimated left ventricular EF = 60%    The cardiac valves are anatomically and functionally normal.       Results for orders placed during the hospital encounter of 11/17/16    Bilateral Carotid Duplex    Interpretation Summary  · Right internal carotid artery stenosis of 0-49%.  · Left internal carotid artery stenosis of 0-49%.       Advance Care Planning   ACP discussion was held with the patient during this visit. Patient does not have an advance directive, declines further assistance.      Assessment:     Overall continued acceptable course with no new interim cardiopulmonary complaints with acceptable functional status. We will defer additional diagnostic or therapeutic intervention from a cardiac perspective at this time. Patient had a normal echocardiogram June 2023.  The patient's syncopal episode in 2023 was an isolated event related to having a kidney stone.  If he has any recurrent  dizziness, presyncope, or syncope, I would consider E patch versus ILR +/- tilt table test, stress test.      Diagnosis Plan   1. Hypertensive heart disease without heart failure  No recurrent angina pectoris or CHF on current activity schedule; continue current treatment       2. Pure hypercholesterolemia  Good lipid panel March 2024, continue rosuvastatin      3. Syncope, unspecified syncope type  The patient's syncopal episode in 2023 was an isolated event related to having a kidney stone.  If he has any recurrent dizziness, presyncope, or syncope, I would consider E patch versus ILR +/- tilt table test, stress test.              Plan:         Patient to continue current medications and close follow up with the above providers.  Tentative cardiology follow up in December 2024 or patient may return sooner PRN.    Electronically signed by BRENDON Soria, 05/22/24, 3:23 PM EDT.

## 2024-05-22 ENCOUNTER — OFFICE VISIT (OUTPATIENT)
Dept: CARDIOLOGY | Facility: CLINIC | Age: 69
End: 2024-05-22
Payer: COMMERCIAL

## 2024-05-22 VITALS
OXYGEN SATURATION: 99 % | HEART RATE: 82 BPM | SYSTOLIC BLOOD PRESSURE: 114 MMHG | WEIGHT: 117.2 LBS | HEIGHT: 66 IN | DIASTOLIC BLOOD PRESSURE: 64 MMHG | BODY MASS INDEX: 18.84 KG/M2

## 2024-05-22 DIAGNOSIS — R55 SYNCOPE, UNSPECIFIED SYNCOPE TYPE: ICD-10-CM

## 2024-05-22 DIAGNOSIS — E78.00 PURE HYPERCHOLESTEROLEMIA: ICD-10-CM

## 2024-05-22 DIAGNOSIS — I11.9 HYPERTENSIVE HEART DISEASE WITHOUT HEART FAILURE: Primary | ICD-10-CM

## 2024-05-22 PROCEDURE — 99214 OFFICE O/P EST MOD 30 MIN: CPT | Performed by: NURSE PRACTITIONER

## 2024-06-11 DIAGNOSIS — E11.42 DIABETIC PERIPHERAL NEUROPATHY: ICD-10-CM

## 2024-06-11 DIAGNOSIS — G89.0 CENTRAL PAIN SYNDROME: ICD-10-CM

## 2024-06-12 RX ORDER — GABAPENTIN 600 MG/1
600 TABLET ORAL 2 TIMES DAILY
Qty: 180 TABLET | Refills: 1 | Status: SHIPPED | OUTPATIENT
Start: 2024-06-12

## 2024-06-12 NOTE — TELEPHONE ENCOUNTER
Rx Refill Note  Requested Prescriptions     Pending Prescriptions Disp Refills    gabapentin (NEURONTIN) 600 MG tablet [Pharmacy Med Name: GABAPENTIN 600 MG TABLET] 180 tablet      Sig: TAKE 1 TABLET BY MOUTH TWICE A DAY      Last filled:11/30/23 90ds 1 refill  Last office visit with prescribing clinician: 11/30/2023      Next office visit with prescribing clinician: 6/28/2024     ANTIONETTE DAVILA  06/12/24, 08:14 EDT    Pending to provider.

## 2024-06-25 DIAGNOSIS — E11.65 TYPE 2 DIABETES MELLITUS WITH HYPERGLYCEMIA, WITHOUT LONG-TERM CURRENT USE OF INSULIN: ICD-10-CM

## 2024-06-26 DIAGNOSIS — E11.65 TYPE 2 DIABETES MELLITUS WITH HYPERGLYCEMIA, WITHOUT LONG-TERM CURRENT USE OF INSULIN: ICD-10-CM

## 2024-06-28 ENCOUNTER — TELEPHONE (OUTPATIENT)
Dept: FAMILY MEDICINE CLINIC | Facility: CLINIC | Age: 69
End: 2024-06-28

## 2024-06-28 ENCOUNTER — OFFICE VISIT (OUTPATIENT)
Dept: NEUROLOGY | Facility: CLINIC | Age: 69
End: 2024-06-28
Payer: COMMERCIAL

## 2024-06-28 VITALS
SYSTOLIC BLOOD PRESSURE: 120 MMHG | OXYGEN SATURATION: 99 % | DIASTOLIC BLOOD PRESSURE: 72 MMHG | BODY MASS INDEX: 18.8 KG/M2 | HEART RATE: 67 BPM | WEIGHT: 117 LBS | HEIGHT: 66 IN

## 2024-06-28 DIAGNOSIS — G51.39 FACIAL SPASM: ICD-10-CM

## 2024-06-28 DIAGNOSIS — I69.90 LATE EFFECTS OF CVA (CEREBROVASCULAR ACCIDENT): ICD-10-CM

## 2024-06-28 DIAGNOSIS — M54.6 CHRONIC RIGHT-SIDED THORACIC BACK PAIN: ICD-10-CM

## 2024-06-28 DIAGNOSIS — G89.29 CHRONIC RIGHT-SIDED THORACIC BACK PAIN: ICD-10-CM

## 2024-06-28 DIAGNOSIS — E11.65 TYPE 2 DIABETES MELLITUS WITH HYPERGLYCEMIA, WITHOUT LONG-TERM CURRENT USE OF INSULIN: ICD-10-CM

## 2024-06-28 DIAGNOSIS — G89.0 CENTRAL PAIN SYNDROME: Primary | ICD-10-CM

## 2024-06-28 RX ORDER — BACLOFEN 10 MG/1
10 TABLET ORAL NIGHTLY
Qty: 90 TABLET | Refills: 3 | Status: SHIPPED | OUTPATIENT
Start: 2024-06-28

## 2024-06-28 NOTE — TELEPHONE ENCOUNTER
Caller: Jose D Bailey    Relationship: Self    Best call back number:     [phone] (Mobile)     Which medication are you concerned about:     Dulaglutide 0.75 MG/0.5ML solution pen-injector     Who prescribed you this medication:     DR NAYLOR    What are your concerns:     PATIENT STATED WITH THE PREVIOUS REFILL, [facility] DID NOT HAVE MEDICATION IN STOCK    PATIENT REQUESTED THE REFILL BE FORWARDED TO:    DAVID ZHOU    TELEPHONE CONTACT:    [phone]    PATIENT STATED HE HAS (1) INJECTION LEFT    PATIENT ALSO STATED IF [facility] NOW HAS THE MEDICATION IN STOCK TO LET HIM KNOW

## 2024-06-28 NOTE — PROGRESS NOTES
Subjective:     Patient ID: Jose D Bailey is a 69 y.o. male.    CC:   Chief Complaint   Patient presents with    DIABETIC PERIPHERAL NEUROPATHY    Central Pain Syndrome       HPI:   History of Present Illness  This is a pleasant 69-year-old male who presents for 7-month neurology follow-up on central pain syndrome, diabetic neuropathy, and long-term effects of CVA. He is currently taking gabapentin 600 mg twice a day for management of his symptoms. He is here for follow-up and refills on his medications today.    He reports his weight has remained stable, and there have been no alterations in his health status since his last visit. There have been no surgeries or hospital visits. He reports no falls. His symptoms of numbness, tingling, and burning sensation remain unchanged, with intermittent tightness in his arms-this is always right sided following his stroke. Gabapentin has been beneficial in managing his symptoms, which he rates his pain as 2 out of 10. Without gabapentin, his pain level can escalate to an 8 out of 10. He also experiences pain in his right thigh upon waking. He denies any stroke-like symptoms. He reports no new concerns. He takes baclofen nightly for facial and back spasms. He occasionally experiences a sensation akin to a sunburn on the right side of his face, which sometimes lasts all day. He does not take any medication for this sensation. He has not tried a muscle relaxer during the day. He takes his baclofen at night to reduce right facial spasms and this is beneficial. He denies any left-sided numbness or tingling. His balance is good.    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 Long term, we have been prescribing him gabapentin 600 mg twice per day for management of his symptoms. He has had some syncopal events. He has had right facial spasms long term and has  been taking baclofen 10 mg twice a day as needed.      He had some concerns of an additional transient spell of numbness and heaviness on the right side with gait difficulties at his last visit, and we went ahead and ordered a CTA of the head and neck for further evaluation. He underwent CTA of the head and neck on 07/14/2023 with [facility]. There was note of mild plaque buildup in the left carotid bulb, but no hemodynamically significant stenosis. No intracranial stenosis, aneurysms, or occlusions. There is some left hilar adenopathy incompletely included within the field of view and recommended dedicated CT scan of the chest.    Followed long term for diabetic peripheral neuropathy, along with central pain syndrome right side secondary to late effects of cerebrovascular accident in 2016.     He had EMG/NCVS 6/7/18 completed on all 4 extremities showing right median neuropathy at wrist but was otherwise normal. 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  He has had no recurrent stroke symptoms. History of left subthalamic lacunar stroke in November 2016 with normal MRI Brain. On aspirin and Crestor for 2nd stroke prevention.       Has had chronic right-sided paresthesias with central pain syndrome following suspected left subthalamic lacunar stroke from 11/2016. He has continued to have alteration in sensation as far as temperatures and textures on the right side since his stroke especially in regards to softer objects. Hard objects are easier for him to hold. He does have some issues with fine motor & manual dexterity.     He states he sustained a fall after he slipped on a piece of ice on 01/08/2022 and fractured his right ankle in 3 places. He had to undergo surgery for the same by Dr. Aguilera and he states he has 9 screws and a plate in his right ankle.       Follows with [facility] [place] for Left eye diabetic  retinopathy and has injections every 4 weeks.     He does report some continued right hand pain and numbness and tingling in first through third fingers.       He does continue to work on [employer] with an [employer] in [place], [place].      Dr. Ady Norwood nephrology.     The patient states he had 21 teeth removed in 12/2021 secondary to diabetic issues.      Now has dentures.     He notes since his stroke in 2016, he is unable to lay on his right side. He states he will experience rhinorrhea on his right side without realizing, as he is unable to feel this side of his face since his stroke.      MRI of the brain was with and without contrast. This was completed on 12/07/2022. This did show stable chronic and age-related changes present. No acute intracranial abnormalities. No abnormal contrast enhancement present. Chronic appearing bilateral lacunar infarcts seen within the left basal ganglia and similar to prior imaging from 2016.      MRI of the thoracic spine without contrast on 12/07/2022 did show some mildly exaggerated thoracic kyphosis. No acute abnormalities. Minimal spondylolysis present. No lesions or other abnormalities present. This is per radiology.      He confirms he had a presyncopal episode in 11/2022 on his first day at [employer]. He was with his wife, children, and grandchildren. He states his blood pressure was a little on the low side, and he was slightly dehydrated.   The following portions of the patient's history were reviewed and updated as appropriate: allergies, current medications, past family history, past medical history, past social history, past surgical history, and problem list.    Past Medical History:   Diagnosis Date    Acute sinusitis     Acute upper respiratory infection     Cataract     Cataract extraction (OD, 2009;  OS, 2011).     Chest pain     Decreased ROM of left shoulder     Diabetes mellitus     Diabetic retinopathy     Diabetic retinopathy  associated with controlled type 2 diabetes mellitus 2020    Dr. Salinas giving shots in eyes     Dyslipidemia     Remained on statin    ED (erectile dysfunction)     H/O cardiovascular stress test     2012    Herpes zoster     Hyperlipidemia     Hypertension     Insulin dependent type 2 diabetes mellitus     Type 2 insulin dependent diabetes (insulin dependent since 2009);  diagnosed approximately 1998.     Junctional nevus of right forearm     Nevus, atypical     Right arm    Overweight     Mild overweight status, BMI 26.7.      Plantar fasciitis     Bilateral plantar fasciitis;  utilizing orthotics.     Quadriceps muscle strain     Vasovagal syncope        Past Surgical History:   Procedure Laterality Date    ANKLE SURGERY Right 01/09/2022    @ [facility]    CATARACT EXTRACTION      OS    COLONOSCOPY      10/11/2010    CYSTOSCOPY W/ URETERAL STENT PLACEMENT Left 6/20/2023    Procedure: CYSTOSCOPY LEFT URETEROSCOPY LASER LITHOTRIPSY,  URETERAL STENT INSERTION;  Surgeon: Montana Bajwa MD;  Location: [facility];  Service: Urology;  Laterality: Left;       Social History     Socioeconomic History    Marital status:    Tobacco Use    Smoking status: Never     Passive exposure: Never    Smokeless tobacco: Never   Vaping Use    Vaping status: Never Used   Substance and Sexual Activity    Alcohol use: No    Drug use: No    Sexual activity: Defer     Partners: Female     Comment:        Family History   Problem Relation Age of Onset    Stroke Mother     Breast cancer Mother     Cancer Father         lung     Stroke Father     Heart attack Father     Other Father         CABG x6    Coronary artery disease Father     Diabetes Father     Lung cancer Father     Coronary artery disease Maternal Grandmother           Current Outpatient Medications:     aspirin 81 MG tablet, Take 1 tablet by mouth Daily., Disp: , Rfl:     baclofen (LIORESAL) 10 MG tablet, Take 1 tablet by mouth Every Night., Disp: 90  "tablet, Rfl: 3    bisoprolol (ZEBeta) 5 MG tablet, Take 0.5 tablets by mouth Daily., Disp: 45 tablet, Rfl: 3    Dulaglutide 0.75 MG/0.5ML solution pen-injector, Inject 0.75 mg under the skin into the appropriate area as directed 1 (One) Time Per Week., Disp: 2 mL, Rfl: 2    empagliflozin (Jardiance) 25 MG tablet tablet, Take 1 tablet by mouth Daily., Disp: 90 tablet, Rfl: 1    gabapentin (NEURONTIN) 600 MG tablet, TAKE 1 TABLET BY MOUTH TWICE A DAY, Disp: 180 tablet, Rfl: 1    lisinopril (PRINIVIL,ZESTRIL) 2.5 MG tablet, Take 1 tablet by mouth Daily., Disp: 90 tablet, Rfl: 3    metFORMIN (Glucophage) 1000 MG tablet, Take 1 tablet by mouth 2 (Two) Times a Day With Meals., Disp: 180 tablet, Rfl: 1    rosuvastatin (CRESTOR) 40 MG tablet, Take 1 tablet by mouth Daily., Disp: 90 tablet, Rfl: 3     Review of Systems     Objective:  /72   Pulse 67   Ht 167.6 cm (66\")   Wt 53.1 kg (117 lb)   SpO2 99%   BMI 18.88 kg/m²     Neurologic Exam  Mental Status   Oriented to person, place, and time.   Speech: speech is normal   Level of consciousness: alert     Cranial Nerves      CN II   Visual fields full to confrontation.      CN III, IV, VI   Pupils are equal, round, and reactive to light.  Extraocular motions are normal.      CN V   Right facial sensation deficit: decreased sensation right face chronic.  Left facial sensation deficit: none     CN VII   Right facial weakness: central.   Left facial weakness: none     CN VIII   CN VIII normal.      CN IX, X   CN IX normal.   CN X normal.      CN XI   CN XI normal.      CN XII   CN XII normal.     Motor Exam   Muscle bulk: normal  Overall muscle tone: normal     Strength   Strength 5/5 throughout.     Sensory Exam     Chronic decreased sensation in right face, right arm, right flank, and right lower extremity, which is stable & unchanged.        Gait, Coordination, and Reflexes      Gait  Gait: (minimal antalgia, no ataxia)     Coordination   Romberg: negative  Finger " to nose coordination: normal  Heel to shin coordination: normal  Tandem walking coordination: normal     Tremor   Resting tremor: absent  Intention tremor: absent  Action tremor: absent     Reflexes   Right : 2+  Left : 2+       Physical Exam  Constitutional:       Appearance: Normal appearance.   Eyes:      Extraocular Movements: EOM normal.      Pupils: Pupils are equal, round, and reactive to light.   Neurological:      Mental Status: He is alert and oriented to person, place, and time.      Motor: Motor strength is normal.      Coordination: Finger-Nose-Finger Test, Heel to Shin Test and Romberg Test normal.      Gait: Tandem walk normal.   Psychiatric:         Mood and Affect: Mood and affect normal.         Speech: Speech normal.         Behavior: Behavior normal.         Thought Content: Thought content normal.         Cognition and Memory: Cognition and memory normal.         Judgment: Judgment normal.    Results:  Results  Laboratory Studies  Hemoglobin A1c is around 7 by report.    Assessment/Plan:     Diagnoses and all orders for this visit:    1. Central pain syndrome (Primary)  Comments:  continue gabapentin    2. Chronic right-sided thoracic back pain  Comments:  completed physical therapy and this was helpful, complete PT exercises at home now  Orders:  -     baclofen (LIORESAL) 10 MG tablet; Take 1 tablet by mouth Every Night.  Dispense: 90 tablet; Refill: 3    3. Facial spasm  Comments:  Right. minimal.  Orders:  -     baclofen (LIORESAL) 10 MG tablet; Take 1 tablet by mouth Every Night.  Dispense: 90 tablet; Refill: 3    4. Late effects of CVA (cerebrovascular accident)  Comments:  continue aspirin and statin           Assessment & Plan  1. Central pain syndrome, diabetic neuropathy, and long-term effects of CVA.  His gabapentin prescription was refilled on 06/12/2023 for a 90-day supply with a refill, indicating a sufficient supply for 6 months. A refill of baclofen was issued. He was  advised to consider taking half a tablet of baclofen, a muscle relaxer, to alleviate discomfort in his face during the day if needed. A controlled substance agreement for gabapentin was signed today.    Follow-up  A follow-up appointment is scheduled for 02/2024.    Reviewed medications, potential side effects and signs and symptoms to report. Discussed risk versus benefits of treatment plan with patient and/or family-including medications, labs and radiology that may be ordered. Addressed questions and concerns during visit. Patient and/or family verbalized understanding and agree with plan.    As part of this patient's treatment plan I am prescribing controlled substances. The patient has been made aware of appropriate use of such medications, including potential risk of somnolence, limited ability to drive and/or work safely, and potential for dependence or overdose. It has also been made clear that these medications are for use by the patient only, without concomitant use of alcohol or other substances unless prescribed. Keep out of reach of children.  Shoaib report has been reviewed. If this is going to be prescribed long term, [facility] Controlled Substance Agreement Contract has also been read and signed by patient and myself.    During this visit the following were done:  Labs Reviewed []    Labs Ordered []    Radiology Reports Reviewed []    Radiology Ordered []    PCP Records Reviewed [x]    Referring Provider Records Reviewed []    ER Records Reviewed []    Hospital Records Reviewed []    History Obtained From Family []    Radiology Images Reviewed []    Other Reviewed []    Records Requested []      06/28/24   11:04 EDT    Patient or patient representative verbalized consent for the use of Ambient Listening during the visit with  BRENDON Abrams for chart documentation. 6/28/2024  13:34 EDT    Note to patient: The 21st Century Cures Act makes medical notes like these available to patients in the  interest of transparency. However, be advised this is a medical document. It is intended as peer to peer communication. It is written in medical language and may contain abbreviations or verbiage that are unfamiliar. It may appear blunt or direct. Medical documents are intended to carry relevant information, facts as evident, and the clinical opinion of the provider.

## 2024-06-28 NOTE — LETTER
June 28, 2024     Adalberto Albarado DO  [address] [address]    Patient: Jose D Bailey   YOB: 1955   Date of Visit: 6/28/2024       Dear Adalberto Albarado DO    Jose D Bailey was in my office today. Below is a copy of my note.    If you have questions, please do not hesitate to call me. I look forward to following Jose D along with you.         Sincerely,        BERNDON Abrams        CC: BRENDON Soria    Subjective:     Patient ID: Jose D Bailey is a 69 y.o. male.    CC:   Chief Complaint   Patient presents with   • DIABETIC PERIPHERAL NEUROPATHY   • Central Pain Syndrome       HPI:   History of Present Illness  This is a pleasant 69-year-old male who presents for 7-month neurology follow-up on central pain syndrome, diabetic neuropathy, and long-term effects of CVA. He is currently taking gabapentin 600 mg twice a day for management of his symptoms. He is here for follow-up and refills on his medications today.    He reports his weight has remained stable, and there have been no alterations in his health status since his last visit. There have been no surgeries or hospital visits. He reports no falls. His symptoms of numbness, tingling, and burning sensation remain unchanged, with intermittent tightness in his arms-this is always right sided following his stroke. Gabapentin has been beneficial in managing his symptoms, which he rates his pain as 2 out of 10. Without gabapentin, his pain level can escalate to an 8 out of 10. He also experiences pain in his right thigh upon waking. He denies any stroke-like symptoms. He reports no new concerns. He takes baclofen nightly for facial and back spasms. He occasionally experiences a sensation akin to a sunburn on the right side of his face, which sometimes lasts all day. He does not take any medication for this sensation. He has not tried a muscle relaxer during the day. He takes his baclofen at night to reduce right facial  spasms and this is beneficial. He denies any left-sided numbness or tingling. His balance is good.    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 Long term, we have been prescribing him gabapentin 600 mg twice per day for management of his symptoms. He has had some syncopal events. He has had right facial spasms long term and has been taking baclofen 10 mg twice a day as needed.      He had some concerns of an additional transient spell of numbness and heaviness on the right side with gait difficulties at his last visit, and we went ahead and ordered a CTA of the head and neck for further evaluation. He underwent CTA of the head and neck on 07/14/2023 with [facility]. There was note of mild plaque buildup in the left carotid bulb, but no hemodynamically significant stenosis. No intracranial stenosis, aneurysms, or occlusions. There is some left hilar adenopathy incompletely included within the field of view and recommended dedicated CT scan of the chest.    Followed long term for diabetic peripheral neuropathy, along with central pain syndrome right side secondary to late effects of cerebrovascular accident in 2016.     He had EMG/NCVS 6/7/18 completed on all 4 extremities showing right median neuropathy at wrist but was otherwise normal. 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  He has had no recurrent stroke symptoms. History of left subthalamic lacunar stroke in November 2016 with normal MRI Brain. On aspirin and Crestor for 2nd stroke prevention.       Has had chronic right-sided paresthesias with central pain syndrome following suspected left subthalamic lacunar stroke from 11/2016. He has continued to have alteration in sensation as far as temperatures and textures on the right side since his stroke  especially in regards to softer objects. Hard objects are easier for him to hold. He does have some issues with fine motor & manual dexterity.     He states he sustained a fall after he slipped on a piece of ice on 01/08/2022 and fractured his right ankle in 3 places. He had to undergo surgery for the same by Dr. Aguilera and he states he has 9 screws and a plate in his right ankle.       Follows with [facility] for Left eye diabetic retinopathy and has injections every 4 weeks.     He does report some continued right hand pain and numbness and tingling in first through third fingers.       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      Dr. Ady Norwood nephrology.     The patient states he had 21 teeth removed in 12/2021 secondary to diabetic issues.      Now has dentures.     He notes since his stroke in 2016, he is unable to lay on his right side. He states he will experience rhinorrhea on his right side without realizing, as he is unable to feel this side of his face since his stroke.      MRI of the brain was with and without contrast. This was completed on 12/07/2022. This did show stable chronic and age-related changes present. No acute intracranial abnormalities. No abnormal contrast enhancement present. Chronic appearing bilateral lacunar infarcts seen within the left basal ganglia and similar to prior imaging from 2016.      MRI of the thoracic spine without contrast on 12/07/2022 did show some mildly exaggerated thoracic kyphosis. No acute abnormalities. Minimal spondylolysis present. No lesions or other abnormalities present. This is per radiology.      He confirms he had a presyncopal episode in 11/2022 on his first day at [employer]. He was with his wife, children, and grandchildren. He states his blood pressure was a little on the low side, and he was slightly dehydrated.   The following portions of the patient's history were  reviewed and updated as appropriate: allergies, current medications, past family history, past medical history, past social history, past surgical history, and problem list.    Past Medical History:   Diagnosis Date   • Acute sinusitis    • Acute upper respiratory infection    • Cataract     Cataract extraction (OD, 2009;  OS, 2011).    • Chest pain    • Decreased ROM of left shoulder    • Diabetes mellitus    • Diabetic retinopathy    • Diabetic retinopathy associated with controlled type 2 diabetes mellitus 2020    Dr. Salinas giving shots in eyes    • Dyslipidemia     Remained on statin   • ED (erectile dysfunction)    • H/O cardiovascular stress test     2012   • Herpes zoster    • Hyperlipidemia    • Hypertension    • Insulin dependent type 2 diabetes mellitus     Type 2 insulin dependent diabetes (insulin dependent since 2009);  diagnosed approximately 1998.    • Junctional nevus of right forearm    • Nevus, atypical     Right arm   • Overweight     Mild overweight status, BMI 26.7.     • Plantar fasciitis     Bilateral plantar fasciitis;  utilizing orthotics.    • Quadriceps muscle strain    • Vasovagal syncope        Past Surgical History:   Procedure Laterality Date   • ANKLE SURGERY Right 01/09/2022    @ [facility]   • CATARACT EXTRACTION      OS   • COLONOSCOPY      10/11/2010   • CYSTOSCOPY W/ URETERAL STENT PLACEMENT Left 6/20/2023    Procedure: CYSTOSCOPY LEFT URETEROSCOPY LASER LITHOTRIPSY,  URETERAL STENT INSERTION;  Surgeon: Montana Bajwa MD;  Location: [facility];  Service: Urology;  Laterality: Left;       Social History     Socioeconomic History   • Marital status:    Tobacco Use   • Smoking status: Never     Passive exposure: Never   • Smokeless tobacco: Never   Vaping Use   • Vaping status: Never Used   Substance and Sexual Activity   • Alcohol use: No   • Drug use: No   • Sexual activity: Defer     Partners: Female     Comment:        Family History   Problem  "Relation Age of Onset   • Stroke Mother    • Breast cancer Mother    • Cancer Father         lung    • Stroke Father    • Heart attack Father    • Other Father         CABG x6   • Coronary artery disease Father    • Diabetes Father    • Lung cancer Father    • Coronary artery disease Maternal Grandmother           Current Outpatient Medications:   •  aspirin 81 MG tablet, Take 1 tablet by mouth Daily., Disp: , Rfl:   •  baclofen (LIORESAL) 10 MG tablet, Take 1 tablet by mouth Every Night., Disp: 90 tablet, Rfl: 3  •  bisoprolol (ZEBeta) 5 MG tablet, Take 0.5 tablets by mouth Daily., Disp: 45 tablet, Rfl: 3  •  Dulaglutide 0.75 MG/0.5ML solution pen-injector, Inject 0.75 mg under the skin into the appropriate area as directed 1 (One) Time Per Week., Disp: 2 mL, Rfl: 2  •  empagliflozin (Jardiance) 25 MG tablet tablet, Take 1 tablet by mouth Daily., Disp: 90 tablet, Rfl: 1  •  gabapentin (NEURONTIN) 600 MG tablet, TAKE 1 TABLET BY MOUTH TWICE A DAY, Disp: 180 tablet, Rfl: 1  •  lisinopril (PRINIVIL,ZESTRIL) 2.5 MG tablet, Take 1 tablet by mouth Daily., Disp: 90 tablet, Rfl: 3  •  metFORMIN (Glucophage) 1000 MG tablet, Take 1 tablet by mouth 2 (Two) Times a Day With Meals., Disp: 180 tablet, Rfl: 1  •  rosuvastatin (CRESTOR) 40 MG tablet, Take 1 tablet by mouth Daily., Disp: 90 tablet, Rfl: 3     Review of Systems     Objective:  /72   Pulse 67   Ht 167.6 cm (66\")   Wt 53.1 kg (117 lb)   SpO2 99%   BMI 18.88 kg/m²     Neurologic Exam  Mental Status   Oriented to person, place, and time.   Speech: speech is normal   Level of consciousness: alert     Cranial Nerves      CN II   Visual fields full to confrontation.      CN III, IV, VI   Pupils are equal, round, and reactive to light.  Extraocular motions are normal.      CN V   Right facial sensation deficit: decreased sensation right face chronic.  Left facial sensation deficit: none     CN VII   Right facial weakness: central.   Left facial weakness: none   "   CN VIII   CN VIII normal.      CN IX, X   CN IX normal.   CN X normal.      CN XI   CN XI normal.      CN XII   CN XII normal.     Motor Exam   Muscle bulk: normal  Overall muscle tone: normal     Strength   Strength 5/5 throughout.     Sensory Exam     Chronic decreased sensation in right face, right arm, right flank, and right lower extremity, which is stable & unchanged.        Gait, Coordination, and Reflexes      Gait  Gait: (minimal antalgia, no ataxia)     Coordination   Romberg: negative  Finger to nose coordination: normal  Heel to shin coordination: normal  Tandem walking coordination: normal     Tremor   Resting tremor: absent  Intention tremor: absent  Action tremor: absent     Reflexes   Right : 2+  Left : 2+       Physical Exam  Constitutional:       Appearance: Normal appearance.   Eyes:      Extraocular Movements: EOM normal.      Pupils: Pupils are equal, round, and reactive to light.   Neurological:      Mental Status: He is alert and oriented to person, place, and time.      Motor: Motor strength is normal.      Coordination: Finger-Nose-Finger Test, Heel to Shin Test and Romberg Test normal.      Gait: Tandem walk normal.   Psychiatric:         Mood and Affect: Mood and affect normal.         Speech: Speech normal.         Behavior: Behavior normal.         Thought Content: Thought content normal.         Cognition and Memory: Cognition and memory normal.         Judgment: Judgment normal.    Results:  Results  Laboratory Studies  Hemoglobin A1c is around 7 by report.    Assessment/Plan:     Diagnoses and all orders for this visit:    1. Central pain syndrome (Primary)  Comments:  continue gabapentin    2. Chronic right-sided thoracic back pain  Comments:  completed physical therapy and this was helpful, complete PT exercises at home now  Orders:  -     baclofen (LIORESAL) 10 MG tablet; Take 1 tablet by mouth Every Night.  Dispense: 90 tablet; Refill: 3    3. Facial  spasm  Comments:  Right. minimal.  Orders:  -     baclofen (LIORESAL) 10 MG tablet; Take 1 tablet by mouth Every Night.  Dispense: 90 tablet; Refill: 3    4. Late effects of CVA (cerebrovascular accident)  Comments:  continue aspirin and statin           Assessment & Plan  1. Central pain syndrome, diabetic neuropathy, and long-term effects of CVA.  His gabapentin prescription was refilled on 06/12/2023 for a 90-day supply with a refill, indicating a sufficient supply for 6 months. A refill of baclofen was issued. He was advised to consider taking half a tablet of baclofen, a muscle relaxer, to alleviate discomfort in his face during the day if needed. A controlled substance agreement for gabapentin was signed today.    Follow-up  A follow-up appointment is scheduled for 02/2024.    Reviewed medications, potential side effects and signs and symptoms to report. Discussed risk versus benefits of treatment plan with patient and/or family-including medications, labs and radiology that may be ordered. Addressed questions and concerns during visit. Patient and/or family verbalized understanding and agree with plan.    As part of this patient's treatment plan I am prescribing controlled substances. The patient has been made aware of appropriate use of such medications, including potential risk of somnolence, limited ability to drive and/or work safely, and potential for dependence or overdose. It has also been made clear that these medications are for use by the patient only, without concomitant use of alcohol or other substances unless prescribed. Keep out of reach of children.  Shoaib report has been reviewed. If this is going to be prescribed long term, [facility] [place] [facility] Controlled Substance Agreement Contract has also been read and signed by patient and myself.    During this visit the following were done:  Labs Reviewed []    Labs Ordered []    Radiology Reports Reviewed []    Radiology Ordered []    PCP Records Reviewed [x]     Referring Provider Records Reviewed []    ER Records Reviewed []    Hospital Records Reviewed []    History Obtained From Family []    Radiology Images Reviewed []    Other Reviewed []    Records Requested []      06/28/24   11:04 EDT    Patient or patient representative verbalized consent for the use of Ambient Listening during the visit with  BRENDON Abrams for chart documentation. 6/28/2024  13:34 EDT    Note to patient: The 21st Century Cures Act makes medical notes like these available to patients in the interest of transparency. However, be advised this is a medical document. It is intended as peer to peer communication. It is written in medical language and may contain abbreviations or verbiage that are unfamiliar. It may appear blunt or direct. Medical documents are intended to carry relevant information, facts as evident, and the clinical opinion of the provider.

## 2024-09-18 ENCOUNTER — LAB (OUTPATIENT)
Dept: LAB | Facility: HOSPITAL | Age: 69
End: 2024-09-18
Payer: COMMERCIAL

## 2024-09-18 ENCOUNTER — OFFICE VISIT (OUTPATIENT)
Dept: FAMILY MEDICINE CLINIC | Facility: CLINIC | Age: 69
End: 2024-09-18
Payer: COMMERCIAL

## 2024-09-18 VITALS
HEIGHT: 66 IN | WEIGHT: 116 LBS | SYSTOLIC BLOOD PRESSURE: 118 MMHG | TEMPERATURE: 98 F | BODY MASS INDEX: 18.64 KG/M2 | OXYGEN SATURATION: 98 % | HEART RATE: 74 BPM | DIASTOLIC BLOOD PRESSURE: 64 MMHG

## 2024-09-18 DIAGNOSIS — E11.65 TYPE 2 DIABETES MELLITUS WITH HYPERGLYCEMIA, WITHOUT LONG-TERM CURRENT USE OF INSULIN: ICD-10-CM

## 2024-09-18 DIAGNOSIS — Z00.00 ANNUAL PHYSICAL EXAM: ICD-10-CM

## 2024-09-18 DIAGNOSIS — Z00.00 ANNUAL PHYSICAL EXAM: Primary | ICD-10-CM

## 2024-09-18 LAB
ALBUMIN SERPL-MCNC: 4.5 G/DL (ref 3.5–5.2)
ALBUMIN/CREATININE RATIO, URINE: >300
ALBUMIN/GLOB SERPL: 1.9 G/DL
ALP SERPL-CCNC: 118 U/L (ref 39–117)
ALT SERPL W P-5'-P-CCNC: 11 U/L (ref 1–41)
ANION GAP SERPL CALCULATED.3IONS-SCNC: 11 MMOL/L (ref 5–15)
AST SERPL-CCNC: 15 U/L (ref 1–40)
BASOPHILS # BLD AUTO: 0.06 10*3/MM3 (ref 0–0.2)
BASOPHILS NFR BLD AUTO: 0.8 % (ref 0–1.5)
BILIRUB SERPL-MCNC: 0.6 MG/DL (ref 0–1.2)
BUN SERPL-MCNC: 17 MG/DL (ref 8–23)
BUN/CREAT SERPL: 11.3 (ref 7–25)
CALCIUM SPEC-SCNC: 10.3 MG/DL (ref 8.6–10.5)
CHLORIDE SERPL-SCNC: 102 MMOL/L (ref 98–107)
CO2 SERPL-SCNC: 25 MMOL/L (ref 22–29)
CREAT SERPL-MCNC: 1.51 MG/DL (ref 0.76–1.27)
DEPRECATED RDW RBC AUTO: 41.6 FL (ref 37–54)
EGFRCR SERPLBLD CKD-EPI 2021: 49.4 ML/MIN/1.73
EOSINOPHIL # BLD AUTO: 0.31 10*3/MM3 (ref 0–0.4)
EOSINOPHIL NFR BLD AUTO: 3.9 % (ref 0.3–6.2)
ERYTHROCYTE [DISTWIDTH] IN BLOOD BY AUTOMATED COUNT: 12.6 % (ref 12.3–15.4)
EXPIRATION DATE: ABNORMAL
EXPIRATION DATE: NORMAL
GLOBULIN UR ELPH-MCNC: 2.4 GM/DL
GLUCOSE SERPL-MCNC: 160 MG/DL (ref 65–99)
HBA1C MFR BLD: 8.3 % (ref 4.5–5.7)
HCT VFR BLD AUTO: 45 % (ref 37.5–51)
HGB BLD-MCNC: 15 G/DL (ref 13–17.7)
IMM GRANULOCYTES # BLD AUTO: 0.05 10*3/MM3 (ref 0–0.05)
IMM GRANULOCYTES NFR BLD AUTO: 0.6 % (ref 0–0.5)
LYMPHOCYTES # BLD AUTO: 1.6 10*3/MM3 (ref 0.7–3.1)
LYMPHOCYTES NFR BLD AUTO: 20.1 % (ref 19.6–45.3)
Lab: ABNORMAL
Lab: NORMAL
MCH RBC QN AUTO: 30.4 PG (ref 26.6–33)
MCHC RBC AUTO-ENTMCNC: 33.3 G/DL (ref 31.5–35.7)
MCV RBC AUTO: 91.1 FL (ref 79–97)
MONOCYTES # BLD AUTO: 0.82 10*3/MM3 (ref 0.1–0.9)
MONOCYTES NFR BLD AUTO: 10.3 % (ref 5–12)
NEUTROPHILS NFR BLD AUTO: 5.11 10*3/MM3 (ref 1.7–7)
NEUTROPHILS NFR BLD AUTO: 64.3 % (ref 42.7–76)
NRBC BLD AUTO-RTO: 0 /100 WBC (ref 0–0.2)
PLATELET # BLD AUTO: 240 10*3/MM3 (ref 140–450)
PMV BLD AUTO: 10.8 FL (ref 6–12)
POC CREATININE URINE: 50
POC MICROALBUMIN URINE: 150
POTASSIUM SERPL-SCNC: 4.1 MMOL/L (ref 3.5–5.2)
PROT SERPL-MCNC: 6.9 G/DL (ref 6–8.5)
RBC # BLD AUTO: 4.94 10*6/MM3 (ref 4.14–5.8)
SODIUM SERPL-SCNC: 138 MMOL/L (ref 136–145)
WBC NRBC COR # BLD AUTO: 7.95 10*3/MM3 (ref 3.4–10.8)

## 2024-09-18 PROCEDURE — 80053 COMPREHEN METABOLIC PANEL: CPT

## 2024-09-18 PROCEDURE — 82044 UR ALBUMIN SEMIQUANTITATIVE: CPT | Performed by: FAMILY MEDICINE

## 2024-09-18 PROCEDURE — 85025 COMPLETE CBC W/AUTO DIFF WBC: CPT

## 2024-09-18 PROCEDURE — 83036 HEMOGLOBIN GLYCOSYLATED A1C: CPT | Performed by: FAMILY MEDICINE

## 2024-09-18 PROCEDURE — 99397 PER PM REEVAL EST PAT 65+ YR: CPT | Performed by: FAMILY MEDICINE

## 2024-09-24 ENCOUNTER — TELEPHONE (OUTPATIENT)
Dept: FAMILY MEDICINE CLINIC | Facility: CLINIC | Age: 69
End: 2024-09-24
Payer: COMMERCIAL

## 2024-09-24 DIAGNOSIS — E11.65 TYPE 2 DIABETES MELLITUS WITH HYPERGLYCEMIA, WITHOUT LONG-TERM CURRENT USE OF INSULIN: ICD-10-CM

## 2024-12-02 DIAGNOSIS — E78.00 PURE HYPERCHOLESTEROLEMIA: ICD-10-CM

## 2024-12-02 RX ORDER — ROSUVASTATIN CALCIUM 40 MG/1
40 TABLET, COATED ORAL DAILY
Qty: 30 TABLET | Refills: 1 | Status: SHIPPED | OUTPATIENT
Start: 2024-12-02

## 2024-12-09 DIAGNOSIS — G89.0 CENTRAL PAIN SYNDROME: ICD-10-CM

## 2024-12-09 DIAGNOSIS — E11.42 DIABETIC PERIPHERAL NEUROPATHY: ICD-10-CM

## 2024-12-09 RX ORDER — GABAPENTIN 600 MG/1
600 TABLET ORAL 2 TIMES DAILY
Qty: 180 TABLET | Refills: 1 | Status: SHIPPED | OUTPATIENT
Start: 2024-12-09

## 2024-12-09 NOTE — TELEPHONE ENCOUNTER
Rx Refill Note  Requested Prescriptions     Pending Prescriptions Disp Refills    gabapentin (NEURONTIN) 600 MG tablet [Pharmacy Med Name: GABAPENTIN 600 MG TABLET] 180 tablet      Sig: TAKE 1 TABLET BY MOUTH 2 TIMES A DAY      Last office visit with prescribing clinician: 6/28/2024   Last telemedicine visit with prescribing clinician: Visit date not found   Next office visit with prescribing clinician: 2/7/2025                         Would you like a call back once the refill request has been completed: [] Yes [] No    If the office needs to give you a call back, can they leave a voicemail: [] Yes [] No    Dorothy Salas CMA  12/09/24, 08:22 EST

## 2024-12-18 ENCOUNTER — OFFICE VISIT (OUTPATIENT)
Dept: FAMILY MEDICINE CLINIC | Facility: CLINIC | Age: 69
End: 2024-12-18
Payer: COMMERCIAL

## 2024-12-18 VITALS
TEMPERATURE: 97.5 F | DIASTOLIC BLOOD PRESSURE: 60 MMHG | HEIGHT: 66 IN | WEIGHT: 116.8 LBS | OXYGEN SATURATION: 97 % | BODY MASS INDEX: 18.77 KG/M2 | HEART RATE: 66 BPM | SYSTOLIC BLOOD PRESSURE: 128 MMHG

## 2024-12-18 DIAGNOSIS — E11.65 TYPE 2 DIABETES MELLITUS WITH HYPERGLYCEMIA, WITHOUT LONG-TERM CURRENT USE OF INSULIN: Primary | ICD-10-CM

## 2024-12-18 LAB
EXPIRATION DATE: ABNORMAL
HBA1C MFR BLD: 8.1 % (ref 4.5–5.7)
Lab: ABNORMAL

## 2024-12-18 PROCEDURE — 83036 HEMOGLOBIN GLYCOSYLATED A1C: CPT | Performed by: FAMILY MEDICINE

## 2024-12-18 PROCEDURE — 99214 OFFICE O/P EST MOD 30 MIN: CPT | Performed by: FAMILY MEDICINE

## 2024-12-18 NOTE — PROGRESS NOTES
Follow Up Office Visit      Patient Name: Jose D Bailey  : 1955   MRN: [id number]     Chief Complaint:    Chief Complaint   Patient presents with    Diabetes       History of Present Illness: Jose D Bailey is a 69 y.o. male who is here today to follow up with uncontrolled diabetes.  Patient has failed Trulicity, Jardiance, and metformin treatment for his diabetes.  Patient has lost around 10 to 20 pounds while on Trulicity so this has limited our use of it.    Otherwise patient seems to be doing well with treatment    Physical exam: Patient's mood and affect is appropriate      Subjective        I have reviewed and the following portions of the patient's history were updated as appropriate: past family history, past medical history, past social history, past surgical history and problem list.    Medications:     Current Outpatient Medications:     aspirin 81 MG tablet, Take 1 tablet by mouth Daily., Disp: , Rfl:     baclofen (LIORESAL) 10 MG tablet, Take 1 tablet by mouth Every Night., Disp: 90 tablet, Rfl: 3    bisoprolol (ZEBeta) 5 MG tablet, Take 0.5 tablets by mouth Daily., Disp: 45 tablet, Rfl: 3    empagliflozin (Jardiance) 25 MG tablet tablet, Take 1 tablet by mouth Daily., Disp: 90 tablet, Rfl: 1    gabapentin (NEURONTIN) 600 MG tablet, TAKE 1 TABLET BY MOUTH 2 TIMES A DAY, Disp: 180 tablet, Rfl: 1    lisinopril (PRINIVIL,ZESTRIL) 2.5 MG tablet, Take 1 tablet by mouth Daily., Disp: 90 tablet, Rfl: 3    metFORMIN (Glucophage) 1000 MG tablet, Take 1 tablet by mouth 2 (Two) Times a Day With Meals., Disp: 180 tablet, Rfl: 1    rosuvastatin (CRESTOR) 40 MG tablet, TAKE 1 TABLET BY MOUTH DAILY, Disp: 30 tablet, Rfl: 1    Semaglutide, 1 MG/DOSE, (OZEMPIC) 4 MG/3ML solution pen-injector, Inject 1 mg under the skin into the appropriate area as directed 1 (One) Time Per Week. Start this dose after free sample, Disp: 3 mL, Rfl: 1    Allergies:   Allergies   Allergen Reactions    Sulfa Antibiotics Hives     "Atorvastatin Myalgia    Pravachol [Pravastatin Sodium] Myalgia    Pravastatin Myalgia    Simvastatin Myalgia    Pseudoephedrine Rash       Objective     Physical Exam: Please see above  Vital Signs:   Vitals:    12/18/24 1008   BP: 128/60   Pulse: 66   Temp: 97.5 °F (36.4 °C)   TempSrc: Infrared   SpO2: 97%   Weight: 53 kg (116 lb 12.8 oz)   Height: 167.6 cm (66\")     Body mass index is 18.85 kg/m².          Assessment / Plan      Assessment/Plan:   Diagnoses and all orders for this visit:    1. Type 2 diabetes mellitus with hyperglycemia, without long-term current use of insulin (Primary)  -     POC Glycosylated Hemoglobin (Hb A1C)  -     Semaglutide, 1 MG/DOSE, (OZEMPIC) 4 MG/3ML solution pen-injector; Inject 1 mg under the skin into the appropriate area as directed 1 (One) Time Per Week. Start this dose after free sample  Dispense: 3 mL; Refill: 1    Stop Trulicity.  Start Ozempic at 0.5 mg dose since he is already been on Trulicity.  Increase to 1 mg after 1 month and then stay on that dose until follow-up.    Patient has failed Trulicity as we cannot increase it due to weight loss.  Is not controlling his blood sugar either so we will stop it and try Ozempic.  May have to try a oral medication next is an alternative to injectables    Follow Up:   Return in about 3 months (around 3/18/2025) for diabetes.    Adalberto Albarado DO  [facility]   "

## 2024-12-19 ENCOUNTER — PRIOR AUTHORIZATION (OUTPATIENT)
Dept: FAMILY MEDICINE CLINIC | Facility: CLINIC | Age: 69
End: 2024-12-19
Payer: COMMERCIAL

## 2024-12-23 ENCOUNTER — OFFICE VISIT (OUTPATIENT)
Dept: CARDIOLOGY | Facility: CLINIC | Age: 69
End: 2024-12-23
Payer: COMMERCIAL

## 2024-12-23 VITALS
HEIGHT: 66 IN | WEIGHT: 118 LBS | SYSTOLIC BLOOD PRESSURE: 122 MMHG | DIASTOLIC BLOOD PRESSURE: 64 MMHG | BODY MASS INDEX: 18.96 KG/M2 | HEART RATE: 76 BPM | OXYGEN SATURATION: 99 %

## 2024-12-23 DIAGNOSIS — I10 PRIMARY HYPERTENSION: ICD-10-CM

## 2024-12-23 DIAGNOSIS — E78.00 PURE HYPERCHOLESTEROLEMIA: ICD-10-CM

## 2024-12-23 DIAGNOSIS — I11.9 HYPERTENSIVE HEART DISEASE WITHOUT HEART FAILURE: Primary | ICD-10-CM

## 2024-12-23 PROCEDURE — 99214 OFFICE O/P EST MOD 30 MIN: CPT | Performed by: NURSE PRACTITIONER

## 2024-12-23 PROCEDURE — 93000 ELECTROCARDIOGRAM COMPLETE: CPT | Performed by: NURSE PRACTITIONER

## 2024-12-23 NOTE — PROGRESS NOTES
Subjective:     Encounter Date:12/23/2024      Patient ID: Jose D Bailey is a 69 y.o.   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       REFERRING PHYSICIAN: Clinton Day MD  CURRENT PHYSICIAN: Adalberto Albarado DO  NEUROLOGIST:  BRENDON Pagan   UROLOGIST: Dr. Morin, [place] Bajwa MD  GASTROENTEROLOGIST: Ruperto Barroso MD  NEPHROLOGIST: [facility]  ORTHOPEDIC SURGEON: Roly Aguilera MD.    Chief Complaint:   Chief Complaint   Patient presents with    Hypertensive heart disease without heart failure     Problem List:  Probable hypertensive cardiovascular disease:  Apparent remote screening 2D echocardiogram, data deficit ([facility]), approximately 2005.   Apparent unremarkable serial treadmill GXT/Cardiolite GXT, data deficit ([facility], serially every 3-4 years x14 years).   Abnormal screening treadmill GXT showing physical deconditioning (accelerated HR with 107% age-predicted MHR with 50% PEC) with post-exercise EKG changes in the absence of anginal type chest pain, as well as frequent PVCs/PACs during exercise without complex forms; without accelerated blood pressure ([facility]), 04/04/12.   Acceptable IV LEXIscan Cardiolite study, LVEF (0.68) with residual class I symptoms, June 2012.  Echocardiogram 6/27/2023: LVEF 60%, cardiac valves anatomically and functionally normal.  Residual class I symptoms, July 2020, July 2021, March 2022, September 2022, April 2023, November 2023, May 2024, December 2024  Hypertension with remote suboptimal control, improved.  Dyslipidemia, maintained on statin.   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  Bilateral plantar fasciitis; utilizing orthotics.   Cataract extraction (OD, 2009; OS, 2011).   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  Presyncope with OSH ED visit in [place] November 2022, patient left AMA, hypotensive and dehydrated  [facility] 2-day hospitalization June 2023 for syncope and collapse with left nephrolithiasis.  Patient is status post cystoscopy.  Syncope felt to be due to dehydration and orthostatic blood pressure.  Pulmonary nodules with stable CT chest August 2023    Allergies   Allergen Reactions    Sulfa Antibiotics Hives    Atorvastatin Myalgia    Pravachol [Pravastatin Sodium] Myalgia    Pravastatin Myalgia    Simvastatin Myalgia    Pseudoephedrine Rash       Current Outpatient Medications   Medication Instructions    aspirin 81 mg, Daily    baclofen (LIORESAL) 10 mg, Oral, Nightly    bisoprolol (ZEBETA) 2.5 mg, Oral, Daily    empagliflozin (JARDIANCE) 25 mg, Oral, Daily    gabapentin (NEURONTIN) 600 mg, Oral, 2 Times Daily    lisinopril (PRINIVIL,ZESTRIL) 2.5 mg, Oral, Daily    metFORMIN (GLUCOPHAGE) 1,000 mg, Oral, 2 Times Daily With Meals    rosuvastatin (CRESTOR) 40 mg, Oral, Daily    Semaglutide (1 MG/DOSE) (OZEMPIC) 1 mg, Subcutaneous, Weekly, Start this dose after free sample         HISTORY OF PRESENT  "ILLNESS:  The patient is here for 7-month follow-up.  Patient walks about 2 miles every day without cardiopulmonary complaints.  He denies any chest pain, shortness of breath, palpitations, dizziness, presyncope, syncope, or edema.  His last A1c was up to 8.1% and he recently started Ozempic.  He used to be on Trulicity prior to this.  He tries to limit sweets but says that he does sometimes drink diet Dr. Pepper.  Blood pressures have been WNL.  Patient had a normal echocardiogram June 2023.      ROS   All other systems reviewed and otherwise negative.      ECG 12 Lead    Date/Time: 12/23/2024 3:27 PM  Performed by: Marisa Adams APRN    Authorized by: Marisa Adams APRN  Rhythm comments: Sinus rhythm with PVCs or fusion complexes, otherwise normal ECG, 81 bpm, QRS 86 ms, QTc 429 ms,  ms, no significant changes from last ECG June 2023             Objective:       Vitals:    12/23/24 1458 12/23/24 1501   BP: 124/70 122/64   BP Location: Right arm Right arm   Patient Position: Sitting Standing   Cuff Size: Adult Adult   Pulse: 76 76   SpO2: 99%    Weight: 53.5 kg (118 lb)    Height: 167.6 cm (66\")      Body mass index is 19.05 kg/m².  Wt Readings from Last 2 Encounters:   12/23/24 53.5 kg (118 lb)   12/18/24 53 kg (116 lb 12.8 oz)        Constitutional:       Appearance: Healthy appearance. Not in distress.   Neck:      Vascular: No JVR. JVD normal.   Pulmonary:      Effort: Pulmonary effort is normal.      Breath sounds: Normal breath sounds. No wheezing. No rhonchi. No rales.   Chest:      Chest wall: Not tender to palpatation.   Cardiovascular:      PMI at left midclavicular line. Normal rate. Regular rhythm. Normal S1. Normal S2.       Murmurs: There is a grade 1/6 systolic murmur at the URSB.      No gallop.  No click. No rub.   Pulses:     Intact distal pulses.   Edema:     Peripheral edema absent.   Abdominal:      General: Bowel sounds are normal.      Palpations: Abdomen is soft.      " Tenderness: There is no abdominal tenderness.   Musculoskeletal: Normal range of motion.         General: No tenderness. Skin:     General: Skin is warm and dry.   Neurological:      General: No focal deficit present.      Mental Status: Alert and oriented to person, place and time.           Lab Review:   Lab Results   Component Value Date    GLUCOSE 160 (H) 09/18/2024    BUN 17 09/18/2024    CREATININE 1.51 (H) 09/18/2024    EGFRIFNONA 59 (L) 04/06/2021    BCR 11.3 09/18/2024    CO2 25.0 09/18/2024    CALCIUM 10.3 09/18/2024    ALBUMIN 4.5 09/18/2024    AST 15 09/18/2024    ALT 11 09/18/2024       Lab Results   Component Value Date    WBC 7.95 09/18/2024    HGB 15.0 09/18/2024    HCT 45.0 09/18/2024    MCV 91.1 09/18/2024     09/18/2024       Lab Results   Component Value Date    HGBA1C 8.1 (A) 12/18/2024       Lab Results   Component Value Date    TSH 1.040 03/06/2024       Lab Results   Component Value Date    CHOL 102 03/06/2024    CHOL 115 06/16/2022     Lab Results   Component Value Date    TRIG 52 03/06/2024    TRIG 83 06/16/2022     Lab Results   Component Value Date    HDL 51 03/06/2024    HDL 46 06/16/2022     Lab Results   Component Value Date    LDL 38 03/06/2024    LDL 53 06/16/2022                 Results for orders placed during the hospital encounter of 06/19/23    Adult Transthoracic Echo Complete W/ Cont if Necessary Per Protocol    Interpretation Summary    Left ventricular systolic function is normal. Estimated left ventricular EF = 60%    The cardiac valves are anatomically and functionally normal.       Results for orders placed during the hospital encounter of 11/17/16    Bilateral Carotid Duplex    Interpretation Summary  · Right internal carotid artery stenosis of 0-49%.  · Left internal carotid artery stenosis of 0-49%.       Advance Care Planning   ACP discussion was held with the patient during this visit. Patient does not have an advance directive, declines further assistance.       Assessment:     Overall continued acceptable course with no new interim cardiopulmonary complaints with good functional status. We will defer additional diagnostic or therapeutic intervention from a cardiac perspective at this time. Patient had a normal echocardiogram June 2023.       Diagnosis Plan   1. Hypertensive heart disease without heart failure  No recurrent angina pectoris or CHF on current activity schedule; continue current treatment       2. Pure hypercholesterolemia  Good lipid panel March 2024, continue rosuvastatin      3. Primary hypertension  Controlled, continue current cardiac medications             Plan:         Patient to continue current medications and close follow up with the above providers.  Tentative cardiology follow up in June 2025 or patient may return sooner PRN.      Electronically signed by BRENDON Soria, 12/23/24, 3:30 PM EST.

## 2025-01-03 ENCOUNTER — TELEPHONE (OUTPATIENT)
Dept: FAMILY MEDICINE CLINIC | Facility: CLINIC | Age: 70
End: 2025-01-03
Payer: COMMERCIAL

## 2025-01-03 NOTE — TELEPHONE ENCOUNTER
Caller: Jose D Bailey    Relationship to patient: Self      Best call back number: [phone]     Provider: NAYLOR    Medication PA needed: OZEMPIC    Reason for call/Prior Auth: PATIENT STATES THE PHARMACY IS NEEDING A PRIOR AUTHORIZATION FOR THIS MEDICATION

## 2025-02-03 DIAGNOSIS — E78.00 PURE HYPERCHOLESTEROLEMIA: ICD-10-CM

## 2025-02-03 RX ORDER — ROSUVASTATIN CALCIUM 40 MG/1
40 TABLET, COATED ORAL DAILY
Qty: 90 TABLET | Refills: 2 | Status: SHIPPED | OUTPATIENT
Start: 2025-02-03

## 2025-02-07 ENCOUNTER — OFFICE VISIT (OUTPATIENT)
Dept: NEUROLOGY | Facility: CLINIC | Age: 70
End: 2025-02-07
Payer: COMMERCIAL

## 2025-02-07 VITALS
WEIGHT: 113.5 LBS | SYSTOLIC BLOOD PRESSURE: 116 MMHG | BODY MASS INDEX: 18.24 KG/M2 | DIASTOLIC BLOOD PRESSURE: 64 MMHG | HEIGHT: 66 IN | OXYGEN SATURATION: 98 % | HEART RATE: 83 BPM

## 2025-02-07 DIAGNOSIS — E11.42 DIABETIC PERIPHERAL NEUROPATHY: ICD-10-CM

## 2025-02-07 DIAGNOSIS — G89.29 CHRONIC RIGHT-SIDED THORACIC BACK PAIN: ICD-10-CM

## 2025-02-07 DIAGNOSIS — I69.90 LATE EFFECTS OF CVA (CEREBROVASCULAR ACCIDENT): ICD-10-CM

## 2025-02-07 DIAGNOSIS — M54.6 CHRONIC RIGHT-SIDED THORACIC BACK PAIN: ICD-10-CM

## 2025-02-07 DIAGNOSIS — G51.39 FACIAL SPASM: ICD-10-CM

## 2025-02-07 DIAGNOSIS — G89.0 CENTRAL PAIN SYNDROME: Primary | ICD-10-CM

## 2025-02-07 RX ORDER — GABAPENTIN 600 MG/1
600 TABLET ORAL 2 TIMES DAILY
Qty: 180 TABLET | Refills: 1 | Status: SHIPPED | OUTPATIENT
Start: 2025-02-07

## 2025-02-07 RX ORDER — BACLOFEN 10 MG/1
10 TABLET ORAL NIGHTLY
Qty: 90 TABLET | Refills: 3 | Status: SHIPPED | OUTPATIENT
Start: 2025-02-07

## 2025-02-07 NOTE — PROGRESS NOTES
Subjective:     Patient ID: Jose D Bailey is a 69 y.o. male.    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 He was last seen in the clinic on 06/28/2024.    He has chronic decreased sensation in his right face, arm, flank, and lower extremity. Long term he has had some right facial spasm. He reports no significant changes in his health status since his last visit. He has not experienced any falls within the past year.     His facial twitching has improved, with no recent episodes observed. He experiences persistent cold sensations and right-sided facial drawing.     He reports no memory issues and continues to work without any related concerns.     At his last visit to the clinic, his gabapentin was refilled, which he takes 600 mg twice per day long term.     He was recommended to take baclofen at a low dose nightly for facial spasms, which he takes 1 tablet nightly, effectively managing his symptoms and facilitating good sleep.    He is being treated for diabetes. His diabetes remains stable, with a recent switch from Trulicity to Ozempic, which he tolerates well. Despite a constant appetite, he struggles to gain weight, a concern he has expressed to Dr. Albarado. He has never experienced high blood sugar readings but has had instances of low readings, including a hypoglycemic episode approximately 1.5 to 2 years ago that required emergency medical attention. He maintains a diet of small, frequent meals throughout the day, including animal crackers and peanut butter and crackers. He is also on Jardiance, prescribed by his cardiologist.    He has dry macular degeneration and has had laser treatment on his left eye. He has not had a shot for 3 times and it is  helping.    He finds his neurological symptoms have been stable.    SOCIAL HISTORY  He continues to work, currently in his 50th year of employment.  He resides at home with his wife.    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 Long term, we have been prescribing him gabapentin 600 mg twice per day for management of his symptoms. He has had some syncopal events. He has had right facial spasms long term and has been taking baclofen 10 mg twice a day as needed.      He had some concerns of an additional transient spell of numbness and heaviness on the right side with gait difficulties at his last visit, and we went ahead and ordered a CTA of the head and neck for further evaluation. He underwent CTA of the head and neck on 07/14/2023 with [facility]. There was note of mild plaque buildup in the left carotid bulb, but no hemodynamically significant stenosis. No intracranial stenosis, aneurysms, or occlusions. There is some left hilar adenopathy incompletely included within the field of view and recommended dedicated CT scan of the chest.     Followed long term for diabetic peripheral neuropathy, along with central pain syndrome right side secondary to late effects of cerebrovascular accident in 2016.     He had EMG/NCVS 6/7/18 completed on all 4 extremities showing right median neuropathy at wrist but was otherwise normal. 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  He has had no recurrent stroke symptoms. History of left subthalamic lacunar stroke in November 2016 with normal MRI Brain. On aspirin and Crestor for 2nd stroke prevention.       Has had chronic right-sided paresthesias with central pain syndrome following suspected left subthalamic lacunar stroke from 11/2016. He has continued to have alteration  in sensation as far as temperatures and textures on the right side since his stroke especially in regards to softer objects. Hard objects are easier for him to hold. He does have some issues with fine motor & manual dexterity.     He states he sustained a fall after he slipped on a piece of ice on 01/08/2022 and fractured his right ankle in 3 places. He had to undergo surgery for the same by Dr. Aguilera and he states he has 9 screws and a plate in his right ankle.       Follows with [facility] for Left eye diabetic retinopathy and has injections every 4 weeks.     He does report some continued right hand pain and numbness and tingling in first through third fingers.       He does continue to work on [employer] with an [employer] in [place], [place].      Dr. Ady Norwood nephrology.     The patient states he had 21 teeth removed in 12/2021 secondary to diabetic issues.      Now has dentures.     He notes since his stroke in 2016, he is unable to lay on his right side. He states he will experience rhinorrhea on his right side without realizing, as he is unable to feel this side of his face since his stroke.      MRI of the brain was with and without contrast. This was completed on 12/07/2022. This did show stable chronic and age-related changes present. No acute intracranial abnormalities. No abnormal contrast enhancement present. Chronic appearing bilateral lacunar infarcts seen within the left basal ganglia and similar to prior imaging from 2016.      MRI of the thoracic spine without contrast on 12/07/2022 did show some mildly exaggerated thoracic kyphosis. No acute abnormalities. Minimal spondylolysis present. No lesions or other abnormalities present. This is per radiology.      He confirms he had a presyncopal episode in 11/2022 on his first day at [employer]. He was with his wife, children, and grandchildren. He states his blood pressure was a little on the low side, and  he was slightly dehydrated.     The following portions of the patient's history were reviewed and updated as appropriate: allergies, current medications, past family history, past medical history, past social history, past surgical history, and problem list.    Past Medical History:   Diagnosis Date    Acute sinusitis     Acute upper respiratory infection     Cataract     Cataract extraction (OD, 2009;  OS, 2011).     Chest pain     Decreased ROM of left shoulder     Diabetes mellitus     Diabetic retinopathy     Diabetic retinopathy associated with controlled type 2 diabetes mellitus 2020    Dr. Salinas giving shots in eyes     Dyslipidemia     Remained on statin    ED (erectile dysfunction)     H/O cardiovascular stress test     2012    Herpes zoster     Hyperlipidemia     Hypertension     Insulin dependent type 2 diabetes mellitus     Type 2 insulin dependent diabetes (insulin dependent since 2009);  diagnosed approximately 1998.     Junctional nevus of right forearm     Nevus, atypical     Right arm    Overweight     Mild overweight status, BMI 26.7.      Plantar fasciitis     Bilateral plantar fasciitis;  utilizing orthotics.     Quadriceps muscle strain     Vasovagal syncope        Past Surgical History:   Procedure Laterality Date    ANKLE SURGERY Right 01/09/2022    @ [facility]    CATARACT EXTRACTION      OS    COLONOSCOPY      10/11/2010    CYSTOSCOPY W/ URETERAL STENT PLACEMENT Left 6/20/2023    Procedure: CYSTOSCOPY LEFT URETEROSCOPY LASER LITHOTRIPSY,  URETERAL STENT INSERTION;  Surgeon: Montana Bajwa MD;  Location: [facility];  Service: Urology;  Laterality: Left;       Social History     Socioeconomic History    Marital status:    Tobacco Use    Smoking status: Never     Passive exposure: Never    Smokeless tobacco: Never   Vaping Use    Vaping status: Never Used   Substance and Sexual Activity    Alcohol use: No    Drug use: No    Sexual activity: Not Currently     Partners:  "Female     Comment:        Family History   Problem Relation Age of Onset    Stroke Mother     Breast cancer Mother     Cancer Father         lung     Stroke Father     Heart attack Father     Other Father         CABG x6    Coronary artery disease Father     Diabetes Father     Lung cancer Father     Coronary artery disease Maternal Grandmother           Current Outpatient Medications:     aspirin 81 MG tablet, Take 1 tablet by mouth Daily., Disp: , Rfl:     baclofen (LIORESAL) 10 MG tablet, Take 1 tablet by mouth Every Night., Disp: 90 tablet, Rfl: 3    bisoprolol (ZEBeta) 5 MG tablet, Take 0.5 tablets by mouth Daily., Disp: 45 tablet, Rfl: 3    empagliflozin (Jardiance) 25 MG tablet tablet, Take 1 tablet by mouth Daily., Disp: 90 tablet, Rfl: 1    gabapentin (NEURONTIN) 600 MG tablet, Take 1 tablet by mouth 2 (Two) Times a Day., Disp: 180 tablet, Rfl: 1    lisinopril (PRINIVIL,ZESTRIL) 2.5 MG tablet, Take 1 tablet by mouth Daily., Disp: 90 tablet, Rfl: 3    metFORMIN (Glucophage) 1000 MG tablet, Take 1 tablet by mouth 2 (Two) Times a Day With Meals., Disp: 180 tablet, Rfl: 1    rosuvastatin (CRESTOR) 40 MG tablet, TAKE 1 TABLET BY MOUTH DAILY, Disp: 90 tablet, Rfl: 2    Semaglutide, 1 MG/DOSE, (OZEMPIC) 4 MG/3ML solution pen-injector, Inject 1 mg under the skin into the appropriate area as directed 1 (One) Time Per Week. Start this dose after free sample, Disp: 3 mL, Rfl: 1     Review of Systems   Neurological:  Positive for numbness.   All other systems reviewed and are negative.       Objective:  /64   Pulse 83   Ht 167.6 cm (66\")   Wt 51.5 kg (113 lb 8 oz)   SpO2 98%   BMI 18.32 kg/m²     Neurological Exam  Mental Status  Alert. Oriented to person, place, time and situation. Speech is normal. Attention and concentration are normal. Fund of knowledge is appropriate for level of education.    Cranial Nerves  CN II: Tested with correction.  CN III, IV, VI: Extraocular movements intact " bilaterally. Normal lids and orbits bilaterally. Pupils equal round and reactive to light bilaterally.  CN V:  Right: Diminished sensation of the entire right side of the face.  Left: Facial sensation is normal on the left.  CN VII:  Right: There is central facial weakness.  Left: There is no facial weakness.  CN IX, X: Palate elevates symmetrically. Normal gag reflex.  CN XI: Shoulder shrug strength is normal.  CN XII: Tongue midline without atrophy or fasciculations.    Motor  Normal muscle bulk throughout. No fasciculations present. Normal muscle tone. No abnormal involuntary movements. Strength is 5/5 throughout all four extremities.    Sensory  Light touch abnormality: Pinprick abnormality: Temperature abnormality: Vibration abnormality: Sensation: Chronic decreased sensation in right face, right arm, right flank, and right lower extremity, which is stable & unchanged.  .     Reflexes                                            Right                      Left  Brachioradialis                    2+                         2+  Biceps                                 2+                         2+  Patellar                                1+                         1+  Achilles                                1+                         1+    Coordination    Finger-to-nose, rapid alternating movements and heel-to-shin normal bilaterally without dysmetria.    Gait  Normal casual, toe, heel and tandem gait. Able to rise from chair without using arms.        Physical Exam  Constitutional:       Appearance: Normal appearance.   Eyes:      General: Lids are normal.      Extraocular Movements: Extraocular movements intact.      Pupils: Pupils are equal, round, and reactive to light.   Neurological:      Mental Status: He is alert.      Motor: Motor strength is normal.     Coordination: Coordination is intact.      Deep Tendon Reflexes:      Reflex Scores:       Bicep reflexes are 2+ on the right side and 2+ on the left  side.       Brachioradialis reflexes are 2+ on the right side and 2+ on the left side.       Patellar reflexes are 1+ on the right side and 1+ on the left side.       Achilles reflexes are 1+ on the right side and 1+ on the left side.  Psychiatric:         Mood and Affect: Mood and affect normal.         Speech: Speech normal.         Results:  Results  Laboratory Studies  Hemoglobin A1c was 8.1% in December 2024. CBC stable. Comprehensive metabolic panel: Glucose 160, creatinine 1.51, alkaline phosphatase 118 on 09/18/2024.    Assessment/Plan:     Diagnoses and all orders for this visit:    1. Central pain syndrome (Primary)  -     gabapentin (NEURONTIN) 600 MG tablet; Take 1 tablet by mouth 2 (Two) Times a Day.  Dispense: 180 tablet; Refill: 1    2. Facial spasm  -     baclofen (LIORESAL) 10 MG tablet; Take 1 tablet by mouth Every Night.  Dispense: 90 tablet; Refill: 3    3. Late effects of CVA (cerebrovascular accident)    4. Diabetic peripheral neuropathy  -     gabapentin (NEURONTIN) 600 MG tablet; Take 1 tablet by mouth 2 (Two) Times a Day.  Dispense: 180 tablet; Refill: 1    5. Chronic right-sided thoracic back pain  Comments:  completed physical therapy and this was helpful, complete PT exercises at home now  Orders:  -     baclofen (LIORESAL) 10 MG tablet; Take 1 tablet by mouth Every Night.  Dispense: 90 tablet; Refill: 3    6. Facial spasm  Comments:  Right. minimal.  Orders:  -     baclofen (LIORESAL) 10 MG tablet; Take 1 tablet by mouth Every Night.  Dispense: 90 tablet; Refill: 3           Assessment & Plan  1. Central Pain Syndrome.  He reports no significant changes in his health status since his last visit. He has not experienced any falls within the past year. His facial twitching has improved, with no recent episodes observed. He experiences persistent cold sensations and right-sided facial drawing. He reports no memory issues and continues to work without any related concerns. He signed his  controlled substance agreement at his last visit. He will continue his current regimen of gabapentin 600 mg twice per day. He is also taking baclofen 10 mg every night to manage facial spasms, which has been effective. He will follow up in August to September 2025, first available or sooner if needed. He verbalized understanding and agrees with plan today.    2. Diabetic Neuropathy.  His diabetes management appears stable with the current medications, following with PCP.    3. Long-term effects of CVA.  He continues to experience chronic decreased sensation in his right face, arm, flank, and lower extremity. He is on aspirin and statin therapy for secondary stroke prevention. He will continue his current treatment regimen and follow up as scheduled.    Follow-up  He will follow up in August to September 2025, first available or sooner if needed.    As part of this patient's treatment plan I am prescribing controlled substances. The patient has been made aware of appropriate use of such medications, including potential risk of somnolence, limited ability to drive and/or work safely, and potential for dependence or overdose. It has also been made clear that these medications are for use by the patient only, without concomitant use of alcohol or other substances unless prescribed. Keep out of reach of children.  Shoaib report has been reviewed. If this is going to be prescribed long term, [facility] [place]rolled Sub[place] Agreement Contract has also been read and signed by patient and myself.    Reviewed medications, potential side effects and signs and symptoms to report. Discussed risk versus benefits of treatment plan with patient and/or family-including medications, labs and radiology that may be ordered. Addressed questions and concerns during visit. Patient and/or family verbalized understanding and agree with plan.    During this visit the following were done:  Labs Reviewed [x]    Labs Ordered []    Radiology Reports  Reviewed []    Radiology Ordered []    PCP Records Reviewed [x]    Referring Provider Records Reviewed []    ER Records Reviewed []    Hospital Records Reviewed []    History Obtained From Family []    Radiology Images Reviewed []    Other Reviewed []    Records Requested []      02/07/25   14:37 EST    Patient or patient representative verbalized consent for the use of Ambient Listening during the visit with  BRENDON Abrams for chart documentation. 2/7/2025  15:50 EST    Note to patient: The 21st Century Cures Act makes medical notes like these available to patients in the interest of transparency. However, be advised this is a medical document. It is intended as peer to peer communication. It is written in medical language and may contain abbreviations or verbiage that are unfamiliar. It may appear blunt or direct. Medical documents are intended to carry relevant information, facts as evident, and the clinical opinion of the provider.

## 2025-02-07 NOTE — LETTER
February 7, 2025     Adalberto Albarado DO  [address] [address]    Patient: Jose D Bailey   YOB: 1955   Date of Visit: 2/7/2025       Dear Adalberto Albarado DO    Jose D Bailey was in my office today. Below is a copy of my note.    If you have questions, please do not hesitate to call me. I look forward to following Jose D along with you.         Sincerely,        BRENDON Abrams        CC: BRENDON Soria    Subjective:     Patient ID: Jose D Bailey is a 69 y.o. male.    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 He was last seen in the clinic on 06/28/2024.    He has chronic decreased sensation in his right face, arm, flank, and lower extremity. Long term he has had some right facial spasm. He reports no significant changes in his health status since his last visit. He has not experienced any falls within the past year.     His facial twitching has improved, with no recent episodes observed. He experiences persistent cold sensations and right-sided facial drawing.     He reports no memory issues and continues to work without any related concerns.     At his last visit to the clinic, his gabapentin was refilled, which he takes 600 mg twice per day long term.     He was recommended to take baclofen at a low dose nightly for facial spasms, which he takes 1 tablet nightly, effectively managing his symptoms and facilitating good sleep.    He is being treated for diabetes. His diabetes remains stable, with a recent switch from Trulicity to Ozempic, which he tolerates well. Despite a constant appetite, he struggles to gain weight, a concern he has expressed to Dr. Albarado. He has never experienced high blood sugar readings  but has had instances of low readings, including a hypoglycemic episode approximately 1.5 to 2 years ago that required emergency medical attention. He maintains a diet of small, frequent meals throughout the day, including animal crackers and peanut butter and crackers. He is also on Jardiance, prescribed by his cardiologist.    He has dry macular degeneration and has had laser treatment on his left eye. He has not had a shot for 3 times and it is helping.    He finds his neurological symptoms have been stable.    SOCIAL HISTORY  He continues to work, currently in his 50th year of employment.  He resides at home with his wife.    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 Long term, we have been prescribing him gabapentin 600 mg twice per day for management of his symptoms. He has had some syncopal events. He has had right facial spasms long term and has been taking baclofen 10 mg twice a day as needed.      He had some concerns of an additional transient spell of numbness and heaviness on the right side with gait difficulties at his last visit, and we went ahead and ordered a CTA of the head and neck for further evaluation. He underwent CTA of the head and neck on 07/14/2023 with [facility]. There was note of mild plaque buildup in the left carotid bulb, but no hemodynamically significant stenosis. No intracranial stenosis, aneurysms, or occlusions. There is some left hilar adenopathy incompletely included within the field of view and recommended dedicated CT scan of the chest.     Followed long term for diabetic peripheral neuropathy, along with central pain syndrome right side secondary to late effects of cerebrovascular accident in 2016.     He had EMG/NCVS 6/7/18 completed on all 4 extremities showing right median neuropathy at wrist but was otherwise normal. Also has  Diabetic Peripheral Neuropathy as well as the baseline paresthesias and central pain syndrome 2nd to CVA.  He has had no recurrent stroke symptoms. History of left subthalamic lacunar stroke in November 2016 with normal MRI Brain. On aspirin and Crestor for 2nd stroke prevention.       Has had chronic right-sided paresthesias with central pain syndrome following suspected left subthalamic lacunar stroke from 11/2016. He has continued to have alteration in sensation as far as temperatures and textures on the right side since his stroke especially in regards to softer objects. Hard objects are easier for him to hold. He does have some issues with fine motor & manual dexterity.     He states he sustained a fall after he slipped on a piece of ice on 01/08/2022 and fractured his right ankle in 3 places. He had to undergo surgery for the same by Dr. Aguilera and he states he has 9 screws and a plate in his right ankle.       Follows with [facility] for Left eye diabetic retinopathy and has injections every 4 weeks.     He does report some continued right hand pain and numbness and tingling in first through third fingers.       He does continue to work on [employer] with an [employer] firm in [place], [place].      Dr. Ady Norwood nephrology.     The patient states he had 21 teeth removed in 12/2021 secondary to diabetic issues.      Now has dentures.     He notes since his stroke in 2016, he is unable to lay on his right side. He states he will experience rhinorrhea on his right side without realizing, as he is unable to feel this side of his face since his stroke.      MRI of the brain was with and without contrast. This was completed on 12/07/2022. This did show stable chronic and age-related changes present. No acute intracranial abnormalities. No abnormal contrast enhancement present. Chronic appearing bilateral lacunar infarcts seen within the left basal ganglia and similar to prior  imaging from 2016.      MRI of the thoracic spine without contrast on 12/07/2022 did show some mildly exaggerated thoracic kyphosis. No acute abnormalities. Minimal spondylolysis present. No lesions or other abnormalities present. This is per radiology.      He confirms he had a presyncopal episode in 11/2022 on his first day at [employer]. He was with his wife, children, and grandchildren. He states his blood pressure was a little on the low side, and he was slightly dehydrated.     The following portions of the patient's history were reviewed and updated as appropriate: allergies, current medications, past family history, past medical history, past social history, past surgical history, and problem list.    Past Medical History:   Diagnosis Date   • Acute sinusitis    • Acute upper respiratory infection    • Cataract     Cataract extraction (OD, 2009;  OS, 2011).    • Chest pain    • Decreased ROM of left shoulder    • Diabetes mellitus    • Diabetic retinopathy    • Diabetic retinopathy associated with controlled type 2 diabetes mellitus 2020    Dr. Salinas giving shots in eyes    • Dyslipidemia     Remained on statin   • ED (erectile dysfunction)    • H/O cardiovascular stress test     2012   • Herpes zoster    • Hyperlipidemia    • Hypertension    • Insulin dependent type 2 diabetes mellitus     Type 2 insulin dependent diabetes (insulin dependent since 2009);  diagnosed approximately 1998.    • Junctional nevus of right forearm    • Nevus, atypical     Right arm   • Overweight     Mild overweight status, BMI 26.7.     • Plantar fasciitis     Bilateral plantar fasciitis;  utilizing orthotics.    • Quadriceps muscle strain    • Vasovagal syncope        Past Surgical History:   Procedure Laterality Date   • ANKLE SURGERY Right 01/09/2022    @ [facility]   • CATARACT EXTRACTION      OS   • COLONOSCOPY      10/11/2010   • CYSTOSCOPY W/ URETERAL STENT PLACEMENT Left 6/20/2023    Procedure: CYSTOSCOPY LEFT  URETEROSCOPY LASER LITHOTRIPSY,  URETERAL STENT INSERTION;  Surgeon: Montana Bajwa MD;  Location: [facility];  Service: Urology;  Laterality: Left;       Social History     Socioeconomic History   • Marital status:    Tobacco Use   • Smoking status: Never     Passive exposure: Never   • Smokeless tobacco: Never   Vaping Use   • Vaping status: Never Used   Substance and Sexual Activity   • Alcohol use: No   • Drug use: No   • Sexual activity: Not Currently     Partners: Female     Comment:        Family History   Problem Relation Age of Onset   • Stroke Mother    • Breast cancer Mother    • Cancer Father         lung    • Stroke Father    • Heart attack Father    • Other Father         CABG x6   • Coronary artery disease Father    • Diabetes Father    • Lung cancer Father    • Coronary artery disease Maternal Grandmother           Current Outpatient Medications:   •  aspirin 81 MG tablet, Take 1 tablet by mouth Daily., Disp: , Rfl:   •  baclofen (LIORESAL) 10 MG tablet, Take 1 tablet by mouth Every Night., Disp: 90 tablet, Rfl: 3  •  bisoprolol (ZEBeta) 5 MG tablet, Take 0.5 tablets by mouth Daily., Disp: 45 tablet, Rfl: 3  •  empagliflozin (Jardiance) 25 MG tablet tablet, Take 1 tablet by mouth Daily., Disp: 90 tablet, Rfl: 1  •  gabapentin (NEURONTIN) 600 MG tablet, Take 1 tablet by mouth 2 (Two) Times a Day., Disp: 180 tablet, Rfl: 1  •  lisinopril (PRINIVIL,ZESTRIL) 2.5 MG tablet, Take 1 tablet by mouth Daily., Disp: 90 tablet, Rfl: 3  •  metFORMIN (Glucophage) 1000 MG tablet, Take 1 tablet by mouth 2 (Two) Times a Day With Meals., Disp: 180 tablet, Rfl: 1  •  rosuvastatin (CRESTOR) 40 MG tablet, TAKE 1 TABLET BY MOUTH DAILY, Disp: 90 tablet, Rfl: 2  •  Semaglutide, 1 MG/DOSE, (OZEMPIC) 4 MG/3ML solution pen-injector, Inject 1 mg under the skin into the appropriate area as directed 1 (One) Time Per Week. Start this dose after free sample, Disp: 3 mL, Rfl: 1     Review of Systems   Neurological:   "Positive for numbness.   All other systems reviewed and are negative.       Objective:  /64   Pulse 83   Ht 167.6 cm (66\")   Wt 51.5 kg (113 lb 8 oz)   SpO2 98%   BMI 18.32 kg/m²     Neurological Exam  Mental Status  Alert. Oriented to person, place, time and situation. Speech is normal. Attention and concentration are normal. Fund of knowledge is appropriate for level of education.    Cranial Nerves  CN II: Tested with correction.  CN III, IV, VI: Extraocular movements intact bilaterally. Normal lids and orbits bilaterally. Pupils equal round and reactive to light bilaterally.  CN V:  Right: Diminished sensation of the entire right side of the face.  Left: Facial sensation is normal on the left.  CN VII:  Right: There is central facial weakness.  Left: There is no facial weakness.  CN IX, X: Palate elevates symmetrically. Normal gag reflex.  CN XI: Shoulder shrug strength is normal.  CN XII: Tongue midline without atrophy or fasciculations.    Motor  Normal muscle bulk throughout. No fasciculations present. Normal muscle tone. No abnormal involuntary movements. Strength is 5/5 throughout all four extremities.    Sensory  Light touch abnormality: Pinprick abnormality: Temperature abnormality: Vibration abnormality: Sensation: Chronic decreased sensation in right face, right arm, right flank, and right lower extremity, which is stable & unchanged.  .     Reflexes                                            Right                      Left  Brachioradialis                    2+                         2+  Biceps                                 2+                         2+  Patellar                                1+                         1+  Achilles                                1+                         1+    Coordination    Finger-to-nose, rapid alternating movements and heel-to-shin normal bilaterally without dysmetria.    Gait  Normal casual, toe, heel and tandem gait. Able to rise from chair without " using arms.        Physical Exam  Constitutional:       Appearance: Normal appearance.   Eyes:      General: Lids are normal.      Extraocular Movements: Extraocular movements intact.      Pupils: Pupils are equal, round, and reactive to light.   Neurological:      Mental Status: He is alert.      Motor: Motor strength is normal.     Coordination: Coordination is intact.      Deep Tendon Reflexes:      Reflex Scores:       Bicep reflexes are 2+ on the right side and 2+ on the left side.       Brachioradialis reflexes are 2+ on the right side and 2+ on the left side.       Patellar reflexes are 1+ on the right side and 1+ on the left side.       Achilles reflexes are 1+ on the right side and 1+ on the left side.  Psychiatric:         Mood and Affect: Mood and affect normal.         Speech: Speech normal.         Results:  Results  Laboratory Studies  Hemoglobin A1c was 8.1% in December 2024. CBC stable. Comprehensive metabolic panel: Glucose 160, creatinine 1.51, alkaline phosphatase 118 on 09/18/2024.    Assessment/Plan:     Diagnoses and all orders for this visit:    1. Central pain syndrome (Primary)  -     gabapentin (NEURONTIN) 600 MG tablet; Take 1 tablet by mouth 2 (Two) Times a Day.  Dispense: 180 tablet; Refill: 1    2. Facial spasm  -     baclofen (LIORESAL) 10 MG tablet; Take 1 tablet by mouth Every Night.  Dispense: 90 tablet; Refill: 3    3. Late effects of CVA (cerebrovascular accident)    4. Diabetic peripheral neuropathy  -     gabapentin (NEURONTIN) 600 MG tablet; Take 1 tablet by mouth 2 (Two) Times a Day.  Dispense: 180 tablet; Refill: 1    5. Chronic right-sided thoracic back pain  Comments:  completed physical therapy and this was helpful, complete PT exercises at home now  Orders:  -     baclofen (LIORESAL) 10 MG tablet; Take 1 tablet by mouth Every Night.  Dispense: 90 tablet; Refill: 3    6. Facial spasm  Comments:  Right. minimal.  Orders:  -     baclofen (LIORESAL) 10 MG tablet; Take 1  tablet by mouth Every Night.  Dispense: 90 tablet; Refill: 3           Assessment & Plan  1. Central Pain Syndrome.  He reports no significant changes in his health status since his last visit. He has not experienced any falls within the past year. His facial twitching has improved, with no recent episodes observed. He experiences persistent cold sensations and right-sided facial drawing. He reports no memory issues and continues to work without any related concerns. He signed his controlled substance agreement at his last visit. He will continue his current regimen of gabapentin 600 mg twice per day. He is also taking baclofen 10 mg every night to manage facial spasms, which has been effective. He will follow up in August to September 2025, first available or sooner if needed. He verbalized understanding and agrees with plan today.    2. Diabetic Neuropathy.  His diabetes management appears stable with the current medications, following with PCP.    3. Long-term effects of CVA.  He continues to experience chronic decreased sensation in his right face, arm, flank, and lower extremity. He is on aspirin and statin therapy for secondary stroke prevention. He will continue his current treatment regimen and follow up as scheduled.    Follow-up  He will follow up in August to September 2025, first available or sooner if needed.    As part of this patient's treatment plan I am prescribing controlled substances. The patient has been made aware of appropriate use of such medications, including potential risk of somnolence, limited ability to drive and/or work safely, and potential for dependence or overdose. It has also been made clear that these medications are for use by the patient only, without concomitant use of alcohol or other substances unless prescribed. Keep out of reach of children.  Shoaib report has been reviewed. If this is going to be prescribed long term, [facility] has also  been read and signed by patient and myself.    Reviewed medications, potential side effects and signs and symptoms to report. Discussed risk versus benefits of treatment plan with patient and/or family-including medications, labs and radiology that may be ordered. Addressed questions and concerns during visit. Patient and/or family verbalized understanding and agree with plan.    During this visit the following were done:  Labs Reviewed [x]    Labs Ordered []    Radiology Reports Reviewed []    Radiology Ordered []    PCP Records Reviewed [x]    Referring Provider Records Reviewed []    ER Records Reviewed []    Hospital Records Reviewed []    History Obtained From Family []    Radiology Images Reviewed []    Other Reviewed []    Records Requested []      02/07/25   14:37 EST    Patient or patient representative verbalized consent for the use of Ambient Listening during the visit with  BRENDON Abrams for chart documentation. 2/7/2025  15:50 EST    Note to patient: The 21st Century Cures Act makes medical notes like these available to patients in the interest of transparency. However, be advised this is a medical document. It is intended as peer to peer communication. It is written in medical language and may contain abbreviations or verbiage that are unfamiliar. It may appear blunt or direct. Medical documents are intended to carry relevant information, facts as evident, and the clinical opinion of the provider.

## 2025-03-10 DIAGNOSIS — E11.65 TYPE 2 DIABETES MELLITUS WITH HYPERGLYCEMIA, WITHOUT LONG-TERM CURRENT USE OF INSULIN: ICD-10-CM

## 2025-03-10 NOTE — TELEPHONE ENCOUNTER
Caller: Leo Jose D JEAN    Relationship: Self    Best call back number: [phone]     Requested Prescriptions:   Requested Prescriptions     Pending Prescriptions Disp Refills    Semaglutide, 1 MG/DOSE, (OZEMPIC) 4 MG/3ML solution pen-injector 3 mL 1     Sig: Inject 1 mg under the skin into the appropriate area as directed 1 (One) Time Per Week. Start this dose after free sample        Pharmacy where request should be sent: [facility] [id number]  - [phone] [facility] [phone] FX     Last office visit with prescribing clinician: 12/18/2024   Last telemedicine visit with prescribing clinician: Visit date not found   Next office visit with prescribing clinician: 3/20/2025     Additional details provided by patient:     Does the patient have less than a 3 day supply:  [] Yes  [x] No    Would you like a call back once the refill request has been completed: [] Yes [x] No    If the office needs to give you a call back, can they leave a voicemail: [] Yes [x] No    Karina Falcon Rep   03/10/25 10:57 EDT

## 2025-03-14 ENCOUNTER — TELEPHONE (OUTPATIENT)
Dept: FAMILY MEDICINE CLINIC | Facility: CLINIC | Age: 70
End: 2025-03-14
Payer: COMMERCIAL

## 2025-03-14 NOTE — TELEPHONE ENCOUNTER
Semaglutide, 1 MG/DOSE, (OZEMPIC) 4 MG/3ML solution pen-injector     PT NEEDS REFILL , HAS BEEN CALLING SINCE 3/10/25 FOR REFILL. PT NEEDS TO TAKE AGAIN ON SUNDAY

## 2025-03-20 ENCOUNTER — OFFICE VISIT (OUTPATIENT)
Dept: FAMILY MEDICINE CLINIC | Facility: CLINIC | Age: 70
End: 2025-03-20
Payer: COMMERCIAL

## 2025-03-20 VITALS
WEIGHT: 109 LBS | HEART RATE: 82 BPM | OXYGEN SATURATION: 97 % | DIASTOLIC BLOOD PRESSURE: 66 MMHG | BODY MASS INDEX: 17.52 KG/M2 | SYSTOLIC BLOOD PRESSURE: 104 MMHG | HEIGHT: 66 IN | TEMPERATURE: 98.6 F

## 2025-03-20 DIAGNOSIS — E11.9 TYPE 2 DIABETES MELLITUS WITHOUT COMPLICATION, WITHOUT LONG-TERM CURRENT USE OF INSULIN: Primary | ICD-10-CM

## 2025-03-20 DIAGNOSIS — R91.1 LUNG NODULE: ICD-10-CM

## 2025-03-20 DIAGNOSIS — R63.4 WEIGHT LOSS: ICD-10-CM

## 2025-03-20 LAB
EXPIRATION DATE: ABNORMAL
HBA1C MFR BLD: 7.2 % (ref 4.5–5.7)
Lab: ABNORMAL

## 2025-03-20 NOTE — PROGRESS NOTES
Follow Up Office Visit      Patient Name: Jose D Bailey  : 1955   MRN: [id number]     Chief Complaint:    Chief Complaint   Patient presents with    Diabetes       History of Present Illness: Jose D Bailey is a 70 y.o. male who is here today to follow up with diabetes and weight loss.  We think his weight loss is likely from medication.  He had stabilized after getting back on Trulicity and stay on the low-dose but his blood sugar was not controlled.  So we switched Ozempic to see if he would have less weight loss there.  He actually had more weight loss were not to stop it today.  He reports that he feels well.  Patient's kidney function had been slightly lower so we had started Jardiance to stabilize his kidney function as well.  Jardiance can cause weight loss we also discussed decreasing that dose      Physical exam: Patient's heart exam was RRR.  Evaluation of the cervical region did not show any lymph nodes that were enlarged, and no enlarged lymph nodes in the supraclavicular region.      Subjective        I have reviewed and the following portions of the patient's history were updated as appropriate: past family history, past medical history, past social history, past surgical history and problem list.    Medications:     Current Outpatient Medications:     aspirin 81 MG tablet, Take 1 tablet by mouth Daily., Disp: , Rfl:     baclofen (LIORESAL) 10 MG tablet, Take 1 tablet by mouth Every Night., Disp: 90 tablet, Rfl: 3    bisoprolol (ZEBeta) 5 MG tablet, Take 0.5 tablets by mouth Daily., Disp: 45 tablet, Rfl: 3    gabapentin (NEURONTIN) 600 MG tablet, Take 1 tablet by mouth 2 (Two) Times a Day., Disp: 180 tablet, Rfl: 1    lisinopril (PRINIVIL,ZESTRIL) 2.5 MG tablet, Take 1 tablet by mouth Daily., Disp: 90 tablet, Rfl: 3    metFORMIN (Glucophage) 1000 MG tablet, Take 1 tablet by mouth 2 (Two) Times a Day With Meals., Disp: 180 tablet, Rfl: 1    rosuvastatin (CRESTOR) 40 MG tablet, TAKE 1 TABLET BY  "MOUTH DAILY, Disp: 90 tablet, Rfl: 2    Dulaglutide 0.75 MG/0.5ML solution auto-injector, Inject 0.5 mL under the skin into the appropriate area as directed 1 (One) Time Per Week., Disp: 6 mL, Rfl: 1    empagliflozin (Jardiance) 10 MG tablet tablet, Take 1 tablet by mouth Daily., Disp: 90 tablet, Rfl: 1    Allergies:   Allergies   Allergen Reactions    Sulfa Antibiotics Hives    Atorvastatin Myalgia    Pravachol [Pravastatin Sodium] Myalgia    Pravastatin Myalgia    Simvastatin Myalgia    Pseudoephedrine Rash       Objective     Physical Exam: Please see above  Vital Signs:   Vitals:    03/20/25 1613   BP: 104/66   Pulse: 82   Temp: 98.6 °F (37 °C)   TempSrc: Infrared   SpO2: 97%   Weight: 49.4 kg (109 lb)   Height: 167.6 cm (66\")     Body mass index is 17.59 kg/m².          Assessment / Plan      Assessment/Plan:   Diagnoses and all orders for this visit:    1. Type 2 diabetes mellitus without complication, without long-term current use of insulin (Primary)  -     POC Glycosylated Hemoglobin (Hb A1C)  -     Comprehensive Metabolic Panel; Future  -     Dulaglutide 0.75 MG/0.5ML solution auto-injector; Inject 0.5 mL under the skin into the appropriate area as directed 1 (One) Time Per Week.  Dispense: 6 mL; Refill: 1  -     empagliflozin (Jardiance) 10 MG tablet tablet; Take 1 tablet by mouth Daily.  Dispense: 90 tablet; Refill: 1    2. Lung nodule  -     CT Chest Without Contrast; Future    3. Weight loss  -     RACHEL + PE; Future    Will do further workup for the weight loss even though we think it may be medication induced.  Check electrophoresis.  Consider further workup with abdominal scan.  CAT scan ordered for lung nodules.    Diabetes controlled.  For this patient and due to his age, I think an A1c below 8 is more appropriate.  The patient's A1c is now controlled but he is losing too much weight so we do need to make medication changes.  Stop Ozempic and start Trulicity low-dose again.  Reduce Jardiance to 10 " mg.  Follow-up in 3 months for A1c.  Will call patient in 1 month for weight check    Follow Up:   Return in about 3 months (around 6/20/2025) for diabetes, Labs today.    Adalberto Albarado DO  [facility] [place]

## 2025-03-24 DIAGNOSIS — E11.65 TYPE 2 DIABETES MELLITUS WITH HYPERGLYCEMIA, WITHOUT LONG-TERM CURRENT USE OF INSULIN: ICD-10-CM

## 2025-03-25 ENCOUNTER — LAB (OUTPATIENT)
Dept: LAB | Facility: HOSPITAL | Age: 70
End: 2025-03-25
Payer: COMMERCIAL

## 2025-03-25 DIAGNOSIS — E11.9 TYPE 2 DIABETES MELLITUS WITHOUT COMPLICATION, WITHOUT LONG-TERM CURRENT USE OF INSULIN: ICD-10-CM

## 2025-03-25 DIAGNOSIS — R63.4 WEIGHT LOSS: ICD-10-CM

## 2025-03-25 LAB
ALBUMIN SERPL-MCNC: 4 G/DL (ref 3.5–5.2)
ALBUMIN/GLOB SERPL: 1.3 G/DL
ALP SERPL-CCNC: 110 U/L (ref 39–117)
ALT SERPL W P-5'-P-CCNC: 21 U/L (ref 1–41)
ANION GAP SERPL CALCULATED.3IONS-SCNC: 14.6 MMOL/L (ref 5–15)
AST SERPL-CCNC: 30 U/L (ref 1–40)
BILIRUB SERPL-MCNC: 0.5 MG/DL (ref 0–1.2)
BUN SERPL-MCNC: 12 MG/DL (ref 8–23)
BUN/CREAT SERPL: 10.5 (ref 7–25)
CALCIUM SPEC-SCNC: 10.4 MG/DL (ref 8.6–10.5)
CHLORIDE SERPL-SCNC: 101 MMOL/L (ref 98–107)
CO2 SERPL-SCNC: 25.4 MMOL/L (ref 22–29)
CREAT SERPL-MCNC: 1.14 MG/DL (ref 0.76–1.27)
EGFRCR SERPLBLD CKD-EPI 2021: 68.8 ML/MIN/1.73
GLOBULIN UR ELPH-MCNC: 3.2 GM/DL
GLUCOSE SERPL-MCNC: 164 MG/DL (ref 65–99)
POTASSIUM SERPL-SCNC: 4.4 MMOL/L (ref 3.5–5.2)
PROT SERPL-MCNC: 7.2 G/DL (ref 6–8.5)
SODIUM SERPL-SCNC: 141 MMOL/L (ref 136–145)

## 2025-03-25 PROCEDURE — 86334 IMMUNOFIX E-PHORESIS SERUM: CPT

## 2025-03-25 PROCEDURE — 82784 ASSAY IGA/IGD/IGG/IGM EACH: CPT

## 2025-03-25 PROCEDURE — 84165 PROTEIN E-PHORESIS SERUM: CPT

## 2025-03-25 PROCEDURE — 36415 COLL VENOUS BLD VENIPUNCTURE: CPT

## 2025-03-25 PROCEDURE — 80053 COMPREHEN METABOLIC PANEL: CPT

## 2025-03-26 ENCOUNTER — RESULTS FOLLOW-UP (OUTPATIENT)
Dept: FAMILY MEDICINE CLINIC | Facility: CLINIC | Age: 70
End: 2025-03-26
Payer: COMMERCIAL

## 2025-03-27 LAB
ALBUMIN SERPL ELPH-MCNC: 3.7 G/DL (ref 2.9–4.4)
ALBUMIN/GLOB SERPL: 1.3 {RATIO} (ref 0.7–1.7)
ALPHA1 GLOB SERPL ELPH-MCNC: 0.3 G/DL (ref 0–0.4)
ALPHA2 GLOB SERPL ELPH-MCNC: 1.2 G/DL (ref 0.4–1)
B-GLOBULIN SERPL ELPH-MCNC: 0.9 G/DL (ref 0.7–1.3)
GAMMA GLOB SERPL ELPH-MCNC: 0.6 G/DL (ref 0.4–1.8)
GLOBULIN SER-MCNC: 3 G/DL (ref 2.2–3.9)
IGA SERPL-MCNC: 184 MG/DL (ref 61–437)
IGG SERPL-MCNC: 684 MG/DL (ref 603–1613)
IGM SERPL-MCNC: 74 MG/DL (ref 15–143)
INTERPRETATION SERPL IEP-IMP: ABNORMAL
LABORATORY COMMENT REPORT: ABNORMAL
M PROTEIN SERPL ELPH-MCNC: ABNORMAL G/DL
PROT SERPL-MCNC: 6.7 G/DL (ref 6–8.5)

## 2025-04-07 ENCOUNTER — HOSPITAL ENCOUNTER (OUTPATIENT)
Dept: CT IMAGING | Facility: HOSPITAL | Age: 70
Discharge: HOME | End: 2025-04-07
Admitting: FAMILY MEDICINE
Payer: COMMERCIAL

## 2025-04-07 DIAGNOSIS — R91.1 LUNG NODULE: ICD-10-CM

## 2025-04-07 PROCEDURE — 71250 CT THORAX DX C-: CPT

## 2025-04-07 RX ORDER — BISOPROLOL FUMARATE 5 MG/1
2.5 TABLET, FILM COATED ORAL DAILY
Qty: 45 TABLET | Refills: 3 | Status: SHIPPED | OUTPATIENT
Start: 2025-04-07

## 2025-04-24 ENCOUNTER — TELEPHONE (OUTPATIENT)
Dept: FAMILY MEDICINE CLINIC | Facility: CLINIC | Age: 70
End: 2025-04-24
Payer: COMMERCIAL

## 2025-04-24 NOTE — TELEPHONE ENCOUNTER
Message from Adalberto Albarado sent at 4/24/2025  9:16 AM EDT -----  Please reach out to the patient to check on his weight to make sure it stabilized and he is not losing any more weight.  He was under 9 pounds at his last visit.

## 2025-04-24 NOTE — TELEPHONE ENCOUNTER
Called and spoke with wife, she states weight has stabilized. Patient has not gained any weight back but he is no longer continuing to lose weight.

## 2025-06-03 DIAGNOSIS — I10 ESSENTIAL HYPERTENSION: ICD-10-CM

## 2025-06-03 RX ORDER — LISINOPRIL 2.5 MG/1
2.5 TABLET ORAL DAILY
Qty: 90 TABLET | Refills: 0 | Status: SHIPPED | OUTPATIENT
Start: 2025-06-03

## 2025-06-18 ENCOUNTER — OFFICE VISIT (OUTPATIENT)
Dept: FAMILY MEDICINE CLINIC | Facility: CLINIC | Age: 70
End: 2025-06-18
Payer: COMMERCIAL

## 2025-06-18 VITALS
OXYGEN SATURATION: 99 % | TEMPERATURE: 98 F | HEART RATE: 71 BPM | SYSTOLIC BLOOD PRESSURE: 118 MMHG | BODY MASS INDEX: 18.4 KG/M2 | DIASTOLIC BLOOD PRESSURE: 64 MMHG | WEIGHT: 114 LBS

## 2025-06-18 DIAGNOSIS — E11.65 TYPE 2 DIABETES MELLITUS WITH HYPERGLYCEMIA, WITHOUT LONG-TERM CURRENT USE OF INSULIN: Primary | ICD-10-CM

## 2025-06-18 LAB
EXPIRATION DATE: ABNORMAL
HBA1C MFR BLD: 7.6 % (ref 4.5–5.7)
Lab: ABNORMAL

## 2025-06-18 NOTE — PROGRESS NOTES
Follow Up Office Visit      Patient Name: Jose D Bailey  : 1955   MRN: [id number]     Chief Complaint:    Chief Complaint   Patient presents with    Diabetes     Follow up       History of Present Illness: Jose D Bailey is a 70 y.o. male who is here today to follow up with diabetes and low weight.  His weight is fairly stable and he is trying to eat more.  He says that having his teeth pulled and all of the issues with his jaw really makes it tough to eat.  Is on some medications that can contribute to weight loss as well.  But overall he says he is trying to do better with eating    Diabetes is stable.  A1C is 7.6.    Physical examination: Mood and affect appropriate      Subjective        I have reviewed and the following portions of the patient's history were updated as appropriate: past family history, past medical history, past social history, past surgical history and problem list.    Medications:     Current Outpatient Medications:     aspirin 81 MG tablet, Take 1 tablet by mouth Daily., Disp: , Rfl:     baclofen (LIORESAL) 10 MG tablet, Take 1 tablet by mouth Every Night., Disp: 90 tablet, Rfl: 3    bisoprolol (ZEBeta) 5 MG tablet, TAKE 1/2 TABLET BY MOUTH DAILY, Disp: 45 tablet, Rfl: 3    Dulaglutide 0.75 MG/0.5ML solution auto-injector, Inject 0.5 mL under the skin into the appropriate area as directed 1 (One) Time Per Week., Disp: 6 mL, Rfl: 1    empagliflozin (Jardiance) 10 MG tablet tablet, Take 1 tablet by mouth Daily., Disp: 90 tablet, Rfl: 1    gabapentin (NEURONTIN) 600 MG tablet, Take 1 tablet by mouth 2 (Two) Times a Day., Disp: 180 tablet, Rfl: 1    lisinopril (PRINIVIL,ZESTRIL) 2.5 MG tablet, Take 1 tablet by mouth Daily., Disp: 90 tablet, Rfl: 0    metFORMIN (Glucophage) 1000 MG tablet, Take 1 tablet by mouth 2 (Two) Times a Day With Meals., Disp: 180 tablet, Rfl: 1    rosuvastatin (CRESTOR) 40 MG tablet, TAKE 1 TABLET BY MOUTH DAILY, Disp: 90 tablet, Rfl: 2    Allergies:   Allergies    Allergen Reactions    Sulfa Antibiotics Hives    Atorvastatin Myalgia    Pravachol [Pravastatin Sodium] Myalgia    Pravastatin Myalgia    Simvastatin Myalgia    Pseudoephedrine Rash       Objective     Physical Exam: Please see above  Vital Signs:   Vitals:    06/18/25 0930   BP: 118/64   Pulse: 71   Temp: 98 °F (36.7 °C)   TempSrc: Infrared   SpO2: 99%   Weight: 51.7 kg (114 lb)     Body mass index is 18.4 kg/m².          Assessment / Plan      Assessment/Plan:   Diagnoses and all orders for this visit:    1. Type 2 diabetes mellitus with hyperglycemia, without long-term current use of insulin (Primary)  -     ORDER: POC Glycosylated Hemoglobin (Hb A1C)    With patient's type of diet and puréeing food and eating soft food-the main issue is likely lack of protein and fiber.  His A1c is fairly stable at the current 7.6 level.  We do not want to go too high like above 8 or 9.  But between 7 and 8 for this patient is likely appropriate given age and circumstance.  Soham also wasting his weight.  His weight is stable.  Encouraged him to eat healthy food options.  Follow-up in 3 to 6 months.  Follow-up as scheduled    Of note-we would like patient's blood sugar to be well-controlled as far as A1c around 7-so we will leave it alone as far as diagnosis to be diabetes with hyperglycemia at this point until he improves it further.  We would like him to improve it but is okay and stable at this time.    Follow Up:   Return for Next scheduled follow up.    Adalberto Albarado DO  [facility] [place]

## 2025-07-03 NOTE — PROGRESS NOTES
Subjective:     Encounter Date:07/09/2025      Patient ID: Jose D Bailey is a 70 y.o.  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       REFERRING PHYSICIAN: Clinton Day MD  CURRENT PHYSICIAN: Adalberto Albarado DO  NEUROLOGIST:  BRENDON Pagan   UROLOGIST: Dr. Morin, [place] Bajwa MD  GASTROENTEROLOGIST: Ruperto Barroso MD  NEPHROLOGIST: [facility]  ORTHOPEDIC SURGEON: Roly Aguilera MD.    Chief Complaint:   Chief Complaint   Patient presents with    Hypertensive heart disease without heart failure     Problem List:  Probable hypertensive cardiovascular disease:  Apparent remote screening 2D echocardiogram, data deficit ([facility]), approximately 2005.   Apparent unremarkable serial treadmill GXT/Cardiolite GXT, data deficit ([facility], serially every 3-4 years x14 years).   Abnormal screening treadmill GXT showing physical deconditioning (accelerated HR with 107% age-predicted MHR with 50% PEC) with post-exercise EKG changes in the absence of anginal type chest pain, as well as frequent PVCs/PACs during exercise without complex forms; without accelerated blood pressure ([facility]), 04/04/12.   Acceptable IV LEXIscan Cardiolite study, LVEF (0.68) with residual class I symptoms, June 2012.  Echocardiogram 6/27/2023: LVEF 60%, cardiac valves anatomically and functionally normal.  Residual class I symptoms, July 2020, July 2021, March 2022, September 2022, April 2023, November 2023, May 2024, December 2024, July 2025  Severe coronary artery atherosclerosis on CT chest April 2025  Hypertension with remote suboptimal control, improved.  Dyslipidemia, maintained on statin.   Uncontrolled type 2 insulin dependent diabetes (insulin dependent since 2009); diagnosed approximately 1998, hemoglobin A1c 8.5% (November 2016); hemoglobin A1c 9.2% (September 2017);  hemoglobin A1c 7.3% (September 2018); 7.7% (May 2019), 7.1% May 2020, 7.7% July 2021, 5.5% June 2022, 6.3% September 2022, 5.6% March 2023, 7.4% September 2023, 6.7% March 2024, 8.1% December 2024, 7.6% June 2025.  Bilateral plantar fasciitis; utilizing orthotics.   Cataract extraction (OD, 2009; OS, 2011).   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  Presyncope with OSH ED visit in [place] November 2022, patient left AMA, hypotensive and dehydrated  [facility] 2-day hospitalization June 2023 for syncope and collapse with left nephrolithiasis.  Patient is status post cystoscopy.  Syncope felt to be due to dehydration and orthostatic blood pressure.  Pulmonary nodules with stable CT chest August 2023    Allergies   Allergen Reactions    Sulfa Antibiotics Hives    Atorvastatin Myalgia    Pravachol [Pravastatin Sodium] Myalgia    Pravastatin Myalgia    Simvastatin Myalgia    Pseudoephedrine Rash       Current Outpatient Medications   Medication Instructions    aspirin 81 mg, Daily    baclofen (LIORESAL) 10 mg, Oral, Nightly    bisoprolol (ZEBETA) 2.5 mg, Oral, Daily    Dulaglutide 0.75 MG/0.5ML solution auto-injector 0.5 mL, Subcutaneous, Weekly    empagliflozin (JARDIANCE) 10 mg, Oral, Daily    gabapentin (NEURONTIN) 600 mg, Oral, 2 Times Daily    lisinopril (PRINIVIL,ZESTRIL) 2.5 mg, Oral, Daily    metFORMIN (GLUCOPHAGE) 1,000 mg, Oral, 2 Times Daily With Meals     "rosuvastatin (CRESTOR) 40 mg, Oral, Daily         HISTORY OF PRESENT ILLNESS:  The patient is here for 7-month follow-up.  The patient denies any chest pain, shortness of breath, palpitations, dizziness, presyncope, syncope, or edema.  He was noted to have severe coronary artery atherosclerosis on CT chest April 2025.  Since the patient's stroke he has some right leg numbness.  Also he says that he is cold on his right side but normal temperature on his left.  He follows closely with neurology.  He is still working and tries to get up frequently at his job to walk around a little bit around every hour or so.      ROS   All other systems reviewed and otherwise negative.    Procedures       Objective:       Vitals:    07/09/25 1505 07/09/25 1512   BP: 122/62 118/64   BP Location: Right arm Left arm   Patient Position: Sitting Standing   Cuff Size: Adult Adult   Pulse: 80    SpO2: 97% 97%   Weight: 53.2 kg (117 lb 3.2 oz)    Height: 167.6 cm (66\")      Body mass index is 18.92 kg/m².  Wt Readings from Last 2 Encounters:   07/09/25 53.2 kg (117 lb 3.2 oz)   06/18/25 51.7 kg (114 lb)        Constitutional:       Appearance: Healthy appearance. Not in distress.   Neck:      Vascular: No JVR. JVD normal.   Pulmonary:      Effort: Pulmonary effort is normal.      Breath sounds: Normal breath sounds. No wheezing. No rhonchi. No rales.   Chest:      Chest wall: Not tender to palpatation.   Cardiovascular:      PMI at left midclavicular line. Normal rate. Regular rhythm. Normal S1. Normal S2.       Murmurs: There is a grade 1/6 systolic murmur at the URSB.      No gallop.  No click. No rub.   Pulses:     Intact distal pulses.   Edema:     Peripheral edema absent.   Abdominal:      General: Bowel sounds are normal.      Palpations: Abdomen is soft.      Tenderness: There is no abdominal tenderness.   Musculoskeletal: Normal range of motion.         General: No tenderness. Skin:     General: Skin is warm and dry.   Neurological: "      General: No focal deficit present.      Mental Status: Alert and oriented to person, place and time.           Lab Review:   Lab Results   Component Value Date    GLUCOSE 164 (H) 03/25/2025    BUN 12 03/25/2025    CREATININE 1.14 03/25/2025    EGFRIFNONA 59 (L) 04/06/2021    BCR 10.5 03/25/2025    CO2 25.4 03/25/2025    CALCIUM 10.4 03/25/2025    ALBUMIN 4.0 03/25/2025    ALBUMIN 3.7 03/25/2025    AST 30 03/25/2025    ALT 21 03/25/2025       Lab Results   Component Value Date    WBC 7.95 09/18/2024    HGB 15.0 09/18/2024    HCT 45.0 09/18/2024    MCV 91.1 09/18/2024     09/18/2024       Lab Results   Component Value Date    HGBA1C 7.6 (A) 06/18/2025       Lab Results   Component Value Date    TSH 1.040 03/06/2024       Lab Results   Component Value Date    CHOL 102 03/06/2024    CHOL 115 06/16/2022     Lab Results   Component Value Date    TRIG 52 03/06/2024    TRIG 83 06/16/2022     Lab Results   Component Value Date    HDL 51 03/06/2024    HDL 46 06/16/2022     Lab Results   Component Value Date    LDL 38 03/06/2024    LDL 53 06/16/2022             Results for orders placed during the hospital encounter of 06/19/23    Adult Transthoracic Echo Complete W/ Cont if Necessary Per Protocol 06/19/2023  3:15 PM    Interpretation Summary    Left ventricular systolic function is normal. Estimated left ventricular EF = 60%    The cardiac valves are anatomically and functionally normal.       Results for orders placed during the hospital encounter of 11/17/16    Bilateral Carotid Duplex 11/17/2016  5:21 PM    Interpretation Summary  · Right internal carotid artery stenosis of 0-49%.  · Left internal carotid artery stenosis of 0-49%.       Advance Care Planning   ACP discussion was held with the patient during this visit. Patient does not have an advance directive, declines further assistance.      Assessment:     Overall continued acceptable course with no new interim cardiopulmonary complaints with acceptable  functional status. We will defer additional diagnostic or therapeutic intervention from a cardiac perspective at this time other than to schedule a stress test.  Patient was noted to have severe coronary artery atherosclerosis on CT chest.       Diagnosis Plan   1. Hypertensive heart disease without heart failure  Stress test      2. Primary hypertension  Controlled, continue current cardiac medications      3. Pure hypercholesterolemia  No new labs to review, continue rosuvastatin      4. Atherosclerosis of native coronary artery of native heart without angina pectoris  Stress Test With Myocardial Perfusion (1 Day)             Plan:         Patient to continue current medications and close follow up with the above providers.  Tentative cardiology follow up in January 2026 or patient may return sooner PRN.  Stress test      Electronically signed by BRENDON Soria, 07/09/25, 3:39 PM EDT.

## 2025-07-09 ENCOUNTER — OFFICE VISIT (OUTPATIENT)
Dept: CARDIOLOGY | Facility: CLINIC | Age: 70
End: 2025-07-09
Payer: COMMERCIAL

## 2025-07-09 VITALS
WEIGHT: 117.2 LBS | BODY MASS INDEX: 18.84 KG/M2 | SYSTOLIC BLOOD PRESSURE: 118 MMHG | OXYGEN SATURATION: 97 % | HEART RATE: 80 BPM | DIASTOLIC BLOOD PRESSURE: 64 MMHG | HEIGHT: 66 IN

## 2025-07-09 DIAGNOSIS — I25.10 ATHEROSCLEROSIS OF NATIVE CORONARY ARTERY OF NATIVE HEART WITHOUT ANGINA PECTORIS: ICD-10-CM

## 2025-07-09 DIAGNOSIS — I11.9 HYPERTENSIVE HEART DISEASE WITHOUT HEART FAILURE: Primary | ICD-10-CM

## 2025-07-09 DIAGNOSIS — I10 PRIMARY HYPERTENSION: ICD-10-CM

## 2025-07-09 DIAGNOSIS — E78.00 PURE HYPERCHOLESTEROLEMIA: ICD-10-CM

## 2025-07-09 PROCEDURE — 99214 OFFICE O/P EST MOD 30 MIN: CPT | Performed by: NURSE PRACTITIONER

## 2025-08-19 ENCOUNTER — HOSPITAL ENCOUNTER (OUTPATIENT)
Facility: HOSPITAL | Age: 70
Discharge: HOME | End: 2025-08-19
Admitting: NURSE PRACTITIONER
Payer: COMMERCIAL

## 2025-08-19 ENCOUNTER — HOSPITAL ENCOUNTER (OUTPATIENT)
Facility: HOSPITAL | Age: 70
Discharge: HOME | End: 2025-08-19
Payer: COMMERCIAL

## 2025-08-19 DIAGNOSIS — E78.5 HYPERLIPIDEMIA LDL GOAL <70: Primary | ICD-10-CM

## 2025-08-19 DIAGNOSIS — I25.10 ATHEROSCLEROSIS OF NATIVE CORONARY ARTERY OF NATIVE HEART WITHOUT ANGINA PECTORIS: ICD-10-CM

## 2025-08-19 LAB
BH CV REST NUCLEAR ISOTOPE DOSE: 9.7 MCI
BH CV STRESS BP STAGE 2: NORMAL
BH CV STRESS BP STAGE 4: NORMAL
BH CV STRESS COMMENTS STAGE 1: NORMAL
BH CV STRESS DOSE REGADENOSON STAGE 1: 0.4
BH CV STRESS DURATION MIN STAGE 1: 1
BH CV STRESS DURATION MIN STAGE 2: 1
BH CV STRESS DURATION MIN STAGE 3: 1
BH CV STRESS DURATION MIN STAGE 4: 1
BH CV STRESS DURATION SEC STAGE 1: 0
BH CV STRESS DURATION SEC STAGE 2: 0
BH CV STRESS DURATION SEC STAGE 3: 0
BH CV STRESS DURATION SEC STAGE 4: 0
BH CV STRESS HR STAGE 1: 77
BH CV STRESS HR STAGE 2: 102
BH CV STRESS HR STAGE 3: 100
BH CV STRESS HR STAGE 4: 89
BH CV STRESS NUCLEAR ISOTOPE DOSE: 27.5 MCI
BH CV STRESS O2 STAGE 1: 98
BH CV STRESS O2 STAGE 2: 98
BH CV STRESS O2 STAGE 3: 99
BH CV STRESS O2 STAGE 4: 98
BH CV STRESS PROTOCOL 1: NORMAL
BH CV STRESS RECOVERY BP: NORMAL MMHG
BH CV STRESS RECOVERY HR: 94 BPM
BH CV STRESS RECOVERY O2: 99 %
BH CV STRESS STAGE 1: 1
BH CV STRESS STAGE 2: 2
BH CV STRESS STAGE 3: 3
BH CV STRESS STAGE 4: 4
MAXIMAL PREDICTED HEART RATE: 149 BPM
PERCENT MAX PREDICTED HR: 72.48 %
SPECT HRT GATED+EF W RNC IV: 69 %
STRESS BASELINE BP: NORMAL MMHG
STRESS BASELINE HR: 70 BPM
STRESS O2 SAT REST: 96 %
STRESS PERCENT HR: 85 %
STRESS POST ESTIMATED WORKLOAD: 1 METS
STRESS POST EXERCISE DUR MIN: 4 MIN
STRESS POST EXERCISE DUR SEC: 0 SEC
STRESS POST O2 SAT PEAK: 98 %
STRESS POST PEAK BP: NORMAL MMHG
STRESS POST PEAK HR: 108 BPM
STRESS TARGET HR: 127 BPM

## 2025-08-19 PROCEDURE — 78452 HT MUSCLE IMAGE SPECT MULT: CPT

## 2025-08-19 PROCEDURE — 78452 HT MUSCLE IMAGE SPECT MULT: CPT | Performed by: INTERNAL MEDICINE

## 2025-08-19 PROCEDURE — 93016 CV STRESS TEST SUPVJ ONLY: CPT | Performed by: INTERNAL MEDICINE

## 2025-08-19 PROCEDURE — 93018 CV STRESS TEST I&R ONLY: CPT | Performed by: INTERNAL MEDICINE

## 2025-08-19 PROCEDURE — 25010000002 REGADENOSON 0.4 MG/5ML SOLUTION: Performed by: NURSE PRACTITIONER

## 2025-08-19 PROCEDURE — 34310000005 TECHNETIUM SESTAMIBI: Performed by: NURSE PRACTITIONER

## 2025-08-19 PROCEDURE — 93017 CV STRESS TEST TRACING ONLY: CPT

## 2025-08-19 PROCEDURE — A9500 TC99M SESTAMIBI: HCPCS | Performed by: NURSE PRACTITIONER

## 2025-08-19 RX ORDER — REGADENOSON 0.08 MG/ML
0.4 INJECTION, SOLUTION INTRAVENOUS
Status: COMPLETED | OUTPATIENT
Start: 2025-08-19 | End: 2025-08-19

## 2025-08-19 RX ADMIN — TECHNETIUM TC 99M SESTAMIBI 1 DOSE: 1 INJECTION INTRAVENOUS at 13:30

## 2025-08-19 RX ADMIN — REGADENOSON 0.4 MG: 0.08 INJECTION INTRAVENOUS at 13:28

## 2025-08-19 RX ADMIN — TECHNETIUM TC 99M SESTAMIBI 1 DOSE: 1 INJECTION INTRAVENOUS at 12:16

## 2025-08-27 ENCOUNTER — OFFICE VISIT (OUTPATIENT)
Dept: NEUROLOGY | Facility: CLINIC | Age: 70
End: 2025-08-27
Payer: COMMERCIAL

## 2025-08-27 ENCOUNTER — LAB (OUTPATIENT)
Age: 70
End: 2025-08-27
Payer: COMMERCIAL

## 2025-08-27 VITALS
BODY MASS INDEX: 18.99 KG/M2 | OXYGEN SATURATION: 99 % | WEIGHT: 118.2 LBS | DIASTOLIC BLOOD PRESSURE: 60 MMHG | SYSTOLIC BLOOD PRESSURE: 106 MMHG | HEART RATE: 73 BPM | HEIGHT: 66 IN

## 2025-08-27 DIAGNOSIS — G89.29 CHRONIC RIGHT-SIDED THORACIC BACK PAIN: ICD-10-CM

## 2025-08-27 DIAGNOSIS — R26.9 GAIT DISTURBANCE: ICD-10-CM

## 2025-08-27 DIAGNOSIS — I69.90 LATE EFFECTS OF CVA (CEREBROVASCULAR ACCIDENT): ICD-10-CM

## 2025-08-27 DIAGNOSIS — G89.0 CENTRAL PAIN SYNDROME: Primary | ICD-10-CM

## 2025-08-27 DIAGNOSIS — R53.1 WEAKNESS: ICD-10-CM

## 2025-08-27 DIAGNOSIS — E11.42 DIABETIC PERIPHERAL NEUROPATHY: ICD-10-CM

## 2025-08-27 DIAGNOSIS — R63.4 WEIGHT LOSS: ICD-10-CM

## 2025-08-27 DIAGNOSIS — R20.2 NUMBNESS AND TINGLING: ICD-10-CM

## 2025-08-27 DIAGNOSIS — G51.39 FACIAL SPASM: ICD-10-CM

## 2025-08-27 DIAGNOSIS — R20.0 NUMBNESS AND TINGLING: ICD-10-CM

## 2025-08-27 DIAGNOSIS — R29.818 PARKINSONIAN FEATURES: ICD-10-CM

## 2025-08-27 DIAGNOSIS — M54.6 CHRONIC RIGHT-SIDED THORACIC BACK PAIN: ICD-10-CM

## 2025-08-27 PROCEDURE — 80053 COMPREHEN METABOLIC PANEL: CPT | Performed by: NURSE PRACTITIONER

## 2025-08-27 PROCEDURE — 83921 ORGANIC ACID SINGLE QUANT: CPT | Performed by: NURSE PRACTITIONER

## 2025-08-27 PROCEDURE — 82550 ASSAY OF CK (CPK): CPT | Performed by: NURSE PRACTITIONER

## 2025-08-27 PROCEDURE — 82306 VITAMIN D 25 HYDROXY: CPT | Performed by: NURSE PRACTITIONER

## 2025-08-27 PROCEDURE — 82746 ASSAY OF FOLIC ACID SERUM: CPT | Performed by: NURSE PRACTITIONER

## 2025-08-27 PROCEDURE — 84439 ASSAY OF FREE THYROXINE: CPT | Performed by: NURSE PRACTITIONER

## 2025-08-27 PROCEDURE — 82607 VITAMIN B-12: CPT | Performed by: NURSE PRACTITIONER

## 2025-08-27 PROCEDURE — 84443 ASSAY THYROID STIM HORMONE: CPT | Performed by: NURSE PRACTITIONER

## 2025-08-27 RX ORDER — GABAPENTIN 600 MG/1
600 TABLET ORAL 2 TIMES DAILY
Qty: 180 TABLET | Refills: 1 | Status: SHIPPED | OUTPATIENT
Start: 2025-08-27

## 2025-08-27 RX ORDER — BACLOFEN 10 MG/1
10 TABLET ORAL NIGHTLY
Qty: 90 TABLET | Refills: 3 | Status: SHIPPED | OUTPATIENT
Start: 2025-08-27

## 2025-08-28 ENCOUNTER — TELEPHONE (OUTPATIENT)
Dept: NEUROLOGY | Facility: CLINIC | Age: 70
End: 2025-08-28
Payer: COMMERCIAL

## 2025-08-29 LAB
25(OH)D3 SERPL-MCNC: 42.3 NG/ML (ref 30–100)
ALBUMIN SERPL-MCNC: 4.4 G/DL (ref 3.5–5.2)
ALBUMIN/GLOB SERPL: 1.7 G/DL
ALP SERPL-CCNC: 91 U/L (ref 39–117)
ALT SERPL W P-5'-P-CCNC: 14 U/L (ref 1–41)
ANION GAP SERPL CALCULATED.3IONS-SCNC: 17.2 MMOL/L (ref 5–15)
AST SERPL-CCNC: 20 U/L (ref 1–40)
BILIRUB SERPL-MCNC: 0.5 MG/DL (ref 0–1.2)
BUN SERPL-MCNC: 15 MG/DL (ref 8–23)
BUN/CREAT SERPL: 12.3 (ref 7–25)
CALCIUM SPEC-SCNC: 10.1 MG/DL (ref 8.6–10.5)
CHLORIDE SERPL-SCNC: 101 MMOL/L (ref 98–107)
CK SERPL-CCNC: 88 U/L (ref 20–200)
CO2 SERPL-SCNC: 22.8 MMOL/L (ref 22–29)
CREAT SERPL-MCNC: 1.22 MG/DL (ref 0.76–1.27)
EGFRCR SERPLBLD CKD-EPI 2021: 63.4 ML/MIN/1.73
FOLATE SERPL-MCNC: 16.9 NG/ML (ref 4.78–24.2)
GLOBULIN UR ELPH-MCNC: 2.6 GM/DL
GLUCOSE SERPL-MCNC: 217 MG/DL (ref 65–99)
POTASSIUM SERPL-SCNC: 4.7 MMOL/L (ref 3.5–5.2)
PROT SERPL-MCNC: 7 G/DL (ref 6–8.5)
SODIUM SERPL-SCNC: 141 MMOL/L (ref 136–145)
T4 FREE SERPL-MCNC: 1.33 NG/DL (ref 0.92–1.68)
TSH SERPL DL<=0.05 MIU/L-ACNC: 1.08 UIU/ML (ref 0.27–4.2)
VIT B12 BLD-MCNC: 380 PG/ML (ref 211–946)